# Patient Record
Sex: FEMALE | Race: WHITE | NOT HISPANIC OR LATINO | Employment: FULL TIME | URBAN - METROPOLITAN AREA
[De-identification: names, ages, dates, MRNs, and addresses within clinical notes are randomized per-mention and may not be internally consistent; named-entity substitution may affect disease eponyms.]

---

## 2021-12-06 ENCOUNTER — TELEPHONE (OUTPATIENT)
Dept: SCHEDULING | Facility: IMAGING CENTER | Age: 46
End: 2021-12-06

## 2021-12-10 ENCOUNTER — HOSPITAL ENCOUNTER (OUTPATIENT)
Facility: MEDICAL CENTER | Age: 46
End: 2021-12-10
Attending: STUDENT IN AN ORGANIZED HEALTH CARE EDUCATION/TRAINING PROGRAM

## 2021-12-10 ENCOUNTER — OFFICE VISIT (OUTPATIENT)
Dept: MEDICAL GROUP | Facility: PHYSICIAN GROUP | Age: 46
End: 2021-12-10

## 2021-12-10 ENCOUNTER — APPOINTMENT (OUTPATIENT)
Dept: RADIOLOGY | Facility: IMAGING CENTER | Age: 46
End: 2021-12-10
Attending: STUDENT IN AN ORGANIZED HEALTH CARE EDUCATION/TRAINING PROGRAM

## 2021-12-10 VITALS
BODY MASS INDEX: 39.4 KG/M2 | OXYGEN SATURATION: 96 % | HEART RATE: 68 BPM | DIASTOLIC BLOOD PRESSURE: 84 MMHG | WEIGHT: 260 LBS | TEMPERATURE: 97.8 F | RESPIRATION RATE: 16 BRPM | HEIGHT: 68 IN | SYSTOLIC BLOOD PRESSURE: 132 MMHG

## 2021-12-10 DIAGNOSIS — M54.12 RADICULOPATHY, CERVICAL: ICD-10-CM

## 2021-12-10 DIAGNOSIS — M53.3 SACROILIAC JOINT DYSFUNCTION: ICD-10-CM

## 2021-12-10 DIAGNOSIS — J45.41 MODERATE PERSISTENT ASTHMA WITH ACUTE EXACERBATION: ICD-10-CM

## 2021-12-10 DIAGNOSIS — R06.02 SHORTNESS OF BREATH: ICD-10-CM

## 2021-12-10 DIAGNOSIS — M06.09 RHEUMATOID ARTHRITIS WITHOUT RHEUMATOID FACTOR, MULTIPLE SITES (HCC): ICD-10-CM

## 2021-12-10 DIAGNOSIS — F41.9 ANXIETY: ICD-10-CM

## 2021-12-10 DIAGNOSIS — E55.9 VITAMIN D DEFICIENCY: ICD-10-CM

## 2021-12-10 DIAGNOSIS — R41.840 ATTENTION AND CONCENTRATION DEFICIT: ICD-10-CM

## 2021-12-10 DIAGNOSIS — B35.4 TINEA CORPORIS: ICD-10-CM

## 2021-12-10 DIAGNOSIS — R60.0 LOWER EXTREMITY EDEMA: ICD-10-CM

## 2021-12-10 DIAGNOSIS — M25.50 ARTHRALGIA OF MULTIPLE JOINTS: ICD-10-CM

## 2021-12-10 DIAGNOSIS — M50.30 DDD (DEGENERATIVE DISC DISEASE), CERVICAL: ICD-10-CM

## 2021-12-10 DIAGNOSIS — G89.4 CHRONIC PAIN SYNDROME: ICD-10-CM

## 2021-12-10 DIAGNOSIS — F51.01 PRIMARY INSOMNIA: ICD-10-CM

## 2021-12-10 DIAGNOSIS — L30.9 ECZEMA, UNSPECIFIED TYPE: ICD-10-CM

## 2021-12-10 DIAGNOSIS — Z76.89 ENCOUNTER TO ESTABLISH CARE: ICD-10-CM

## 2021-12-10 DIAGNOSIS — F33.1 MODERATE EPISODE OF RECURRENT MAJOR DEPRESSIVE DISORDER (HCC): ICD-10-CM

## 2021-12-10 PROBLEM — I51.89 DIASTOLIC DYSFUNCTION: Status: ACTIVE | Noted: 2021-12-10

## 2021-12-10 PROBLEM — G47.00 INSOMNIA: Status: ACTIVE | Noted: 2021-12-10

## 2021-12-10 LAB
ALBUMIN SERPL BCP-MCNC: 4.2 G/DL (ref 3.2–4.9)
ALBUMIN/GLOB SERPL: 1.6 G/DL
ALP SERPL-CCNC: 97 U/L (ref 30–99)
ALT SERPL-CCNC: 37 U/L (ref 2–50)
ANION GAP SERPL CALC-SCNC: 12 MMOL/L (ref 7–16)
AST SERPL-CCNC: 34 U/L (ref 12–45)
BASOPHILS # BLD AUTO: 0.8 % (ref 0–1.8)
BASOPHILS # BLD: 0.06 K/UL (ref 0–0.12)
BILIRUB SERPL-MCNC: 0.3 MG/DL (ref 0.1–1.5)
BUN SERPL-MCNC: 19 MG/DL (ref 8–22)
CALCIUM SERPL-MCNC: 9 MG/DL (ref 8.4–10.2)
CHLORIDE SERPL-SCNC: 103 MMOL/L (ref 96–112)
CO2 SERPL-SCNC: 25 MMOL/L (ref 20–33)
CREAT SERPL-MCNC: 1.03 MG/DL (ref 0.5–1.4)
EOSINOPHIL # BLD AUTO: 0.46 K/UL (ref 0–0.51)
EOSINOPHIL NFR BLD: 6.1 % (ref 0–6.9)
ERYTHROCYTE [DISTWIDTH] IN BLOOD BY AUTOMATED COUNT: 42.2 FL (ref 35.9–50)
GLOBULIN SER CALC-MCNC: 2.7 G/DL (ref 1.9–3.5)
GLUCOSE SERPL-MCNC: 111 MG/DL (ref 65–99)
HCT VFR BLD AUTO: 45.8 % (ref 37–47)
HGB BLD-MCNC: 15.3 G/DL (ref 12–16)
IMM GRANULOCYTES # BLD AUTO: 0.04 K/UL (ref 0–0.11)
IMM GRANULOCYTES NFR BLD AUTO: 0.5 % (ref 0–0.9)
LYMPHOCYTES # BLD AUTO: 2.55 K/UL (ref 1–4.8)
LYMPHOCYTES NFR BLD: 33.8 % (ref 22–41)
MCH RBC QN AUTO: 28.8 PG (ref 27–33)
MCHC RBC AUTO-ENTMCNC: 33.4 G/DL (ref 33.6–35)
MCV RBC AUTO: 86.3 FL (ref 81.4–97.8)
MONOCYTES # BLD AUTO: 0.48 K/UL (ref 0–0.85)
MONOCYTES NFR BLD AUTO: 6.4 % (ref 0–13.4)
NEUTROPHILS # BLD AUTO: 3.95 K/UL (ref 2–7.15)
NEUTROPHILS NFR BLD: 52.4 % (ref 44–72)
NRBC # BLD AUTO: 0 K/UL
NRBC BLD-RTO: 0 /100 WBC
NT-PROBNP SERPL IA-MCNC: 134 PG/ML (ref 0–125)
PLATELET # BLD AUTO: 183 K/UL (ref 164–446)
PMV BLD AUTO: 10.6 FL (ref 9–12.9)
POTASSIUM SERPL-SCNC: 4.4 MMOL/L (ref 3.6–5.5)
PROT SERPL-MCNC: 6.9 G/DL (ref 6–8.2)
RBC # BLD AUTO: 5.31 M/UL (ref 4.2–5.4)
SODIUM SERPL-SCNC: 140 MMOL/L (ref 135–145)
TSH SERPL DL<=0.005 MIU/L-ACNC: 5.13 UIU/ML (ref 0.38–5.33)
WBC # BLD AUTO: 7.5 K/UL (ref 4.8–10.8)

## 2021-12-10 PROCEDURE — 71046 X-RAY EXAM CHEST 2 VIEWS: CPT | Mod: TC | Performed by: FAMILY MEDICINE

## 2021-12-10 PROCEDURE — 80053 COMPREHEN METABOLIC PANEL: CPT

## 2021-12-10 PROCEDURE — 99204 OFFICE O/P NEW MOD 45 MIN: CPT | Performed by: STUDENT IN AN ORGANIZED HEALTH CARE EDUCATION/TRAINING PROGRAM

## 2021-12-10 PROCEDURE — 83880 ASSAY OF NATRIURETIC PEPTIDE: CPT

## 2021-12-10 PROCEDURE — 85025 COMPLETE CBC W/AUTO DIFF WBC: CPT

## 2021-12-10 PROCEDURE — 82306 VITAMIN D 25 HYDROXY: CPT

## 2021-12-10 PROCEDURE — 84443 ASSAY THYROID STIM HORMONE: CPT

## 2021-12-10 RX ORDER — LEVALBUTEROL TARTRATE 45 UG/1
1-2 AEROSOL, METERED ORAL EVERY 4 HOURS PRN
COMMUNITY
End: 2021-12-16 | Stop reason: SDUPTHER

## 2021-12-10 RX ORDER — DULOXETIN HYDROCHLORIDE 60 MG/1
120 CAPSULE, DELAYED RELEASE ORAL DAILY
COMMUNITY
Start: 2021-11-23 | End: 2022-07-26 | Stop reason: SDUPTHER

## 2021-12-10 RX ORDER — PREDNISONE 20 MG/1
40 TABLET ORAL DAILY
Qty: 10 TABLET | Refills: 0 | Status: SHIPPED | OUTPATIENT
Start: 2021-12-10 | End: 2021-12-15

## 2021-12-10 RX ORDER — ZOLPIDEM TARTRATE 10 MG/1
10 TABLET ORAL
COMMUNITY
Start: 2021-11-30 | End: 2022-01-28 | Stop reason: SDUPTHER

## 2021-12-10 RX ORDER — METAXALONE 800 MG/1
800 TABLET ORAL EVERY 12 HOURS PRN
COMMUNITY
Start: 2021-11-26 | End: 2022-08-02 | Stop reason: SDUPTHER

## 2021-12-10 RX ORDER — OMEPRAZOLE 40 MG/1
40 CAPSULE, DELAYED RELEASE ORAL DAILY
COMMUNITY
Start: 2021-11-10 | End: 2022-09-01 | Stop reason: SDUPTHER

## 2021-12-10 RX ORDER — CELECOXIB 200 MG/1
200 CAPSULE ORAL DAILY
COMMUNITY
Start: 2021-11-12 | End: 2022-11-29

## 2021-12-10 RX ORDER — FUROSEMIDE 20 MG/1
20 TABLET ORAL
Qty: 30 TABLET | Refills: 0 | Status: SHIPPED | OUTPATIENT
Start: 2021-12-10 | End: 2022-01-05

## 2021-12-10 RX ORDER — PROPRANOLOL HYDROCHLORIDE 80 MG/1
80 CAPSULE, EXTENDED RELEASE ORAL DAILY
COMMUNITY
Start: 2021-11-28 | End: 2022-11-29 | Stop reason: SDUPTHER

## 2021-12-10 RX ORDER — BREXPIPRAZOLE 3 MG/1
3 TABLET ORAL DAILY
COMMUNITY
Start: 2021-11-26 | End: 2022-05-09

## 2021-12-10 RX ORDER — AMOXICILLIN 500 MG/1
TABLET, FILM COATED ORAL
COMMUNITY
Start: 2021-10-15 | End: 2021-12-10

## 2021-12-10 RX ORDER — AZITHROMYCIN 250 MG/1
TABLET, FILM COATED ORAL
COMMUNITY
Start: 2021-10-15 | End: 2021-12-10

## 2021-12-10 RX ORDER — ATORVASTATIN CALCIUM 20 MG/1
20 TABLET, FILM COATED ORAL DAILY
COMMUNITY
Start: 2021-11-26 | End: 2022-03-30 | Stop reason: SDUPTHER

## 2021-12-10 RX ORDER — PREGABALIN 200 MG/1
200-400 CAPSULE ORAL 2 TIMES DAILY
COMMUNITY
End: 2022-01-26 | Stop reason: SDUPTHER

## 2021-12-10 RX ORDER — CLOTRIMAZOLE AND BETAMETHASONE DIPROPIONATE 10; .64 MG/G; MG/G
1 CREAM TOPICAL 2 TIMES DAILY
Qty: 60 G | Refills: 0 | Status: SHIPPED | OUTPATIENT
Start: 2021-12-10 | End: 2022-02-15

## 2021-12-10 RX ORDER — ALPRAZOLAM 1 MG/1
1 TABLET ORAL EVERY 8 HOURS PRN
COMMUNITY
End: 2022-01-28 | Stop reason: SDUPTHER

## 2021-12-10 RX ORDER — CYCLOBENZAPRINE HCL 10 MG
10 TABLET ORAL DAILY
COMMUNITY
Start: 2021-11-27 | End: 2022-02-07 | Stop reason: SDUPTHER

## 2021-12-10 RX ORDER — HYDROXYZINE PAMOATE 50 MG/1
CAPSULE ORAL
COMMUNITY
Start: 2021-09-21 | End: 2021-12-10

## 2021-12-10 ASSESSMENT — PATIENT HEALTH QUESTIONNAIRE - PHQ9
SUM OF ALL RESPONSES TO PHQ QUESTIONS 1-9: 17
CLINICAL INTERPRETATION OF PHQ2 SCORE: 6
5. POOR APPETITE OR OVEREATING: 0 - NOT AT ALL

## 2021-12-10 NOTE — ASSESSMENT & PLAN NOTE
This is a chronic condition.  Patient states she was previously on Advair but has not taken in some time.  She states that she periodically uses Xopenex, no longer albuterol for about 3 months as it was causing higher heart rate.  She finds that it is effective but using multiple times a day, usually 6.  In the past 2 weeks she has had more shortness of breath which feels to her like an asthma exacerbation.

## 2021-12-10 NOTE — PROGRESS NOTES
Subjective:     CC: establish care and discuss shortness of breath and leg swelling    HISTORY OF THE PRESENT ILLNESS: Patient is a 46 y.o. female. This pleasant patient is here today to establish care and discuss shortness of breath and leg swelling. His/her prior PCP was Dr. Kruse.    Moderate persistent asthma with acute exacerbation  This is a chronic condition.  Patient states she was previously on Advair but has not taken in some time.  She states that she periodically uses Xopenex, no longer albuterol for about 3 months as it was causing higher heart rate.  She finds that it is effective but using multiple times a day, usually 6.  In the past 2 weeks she has had more shortness of breath which feels to her like an asthma exacerbation.    Reports ongoing LE edema for more than 2 months. Her rheumatologist ordered an echo, but didn't rx any medications. Doesn't follow with cardiologist, but works at a cardiology office at [employer]. Patient reports she tries not to go to the doctor unless she has to. Denies orthopnea, chest pain or lightheadedness.    Patient reports her psychiatrist 'disappeared' (Dr. Wang) and she has been out of her methylphenidate (reports 20mg BID) for about a month, having issues with concentration. Patient thinks she may be bipolar, states she has no formal dx of that.    Has an ongoing pruritic rash on her left hand, improved with antifungal/steroid cream and needs a refill. Reports past hx of eczema.     Current Outpatient Medications Ordered in Epic   Medication Sig Dispense Refill   • atorvastatin (LIPITOR) 20 MG Tab Take 20 mg by mouth every day.     • REXULTI 3 MG Tab Take 3 mg by mouth every day.     • celecoxib (CELEBREX) 200 MG Cap Take 200 mg by mouth every day.     • cyclobenzaprine (FLEXERIL) 10 mg Tab Take 10 mg by mouth every day.     • DULoxetine (CYMBALTA) 60 MG Cap DR Particles delayed-release capsule Take 120 mg by mouth every day.     • metaxalone (SKELAXIN) 800  "MG Tab Take 800 mg by mouth every 12 hours as needed.     • propranolol CR (INDERAL LA) 80 MG CAPSULE SR 24 HR Take 80 mg by mouth every day.     • zolpidem (AMBIEN) 10 MG Tab      • omeprazole (PRILOSEC) 40 MG delayed-release capsule Take 40 mg by mouth every day.     • levalbuterol (XOPENEX HFA) 45 MCG/ACT inhaler Inhale 1-2 Puffs every four hours as needed for Shortness of Breath.     • predniSONE (DELTASONE) 20 MG Tab Take 2 Tablets by mouth every day for 5 days. 10 Tablet 0   • pregabalin (LYRICA) 200 MG capsule Take 200-400 mg by mouth 2 times a day. 200mg in the morning and 400mg at night     • ALPRAZolam (XANAX) 1 MG Tab Take 1 mg by mouth every 8 hours as needed for Sleep.     • clotrimazole-betamethasone (LOTRISONE) 1-0.05 % Cream Apply 1 Application topically 2 times a day. 60 g 0   • fluticasone-salmeterol (ADVAIR) 250-50 MCG/DOSE AEROSOL POWDER, BREATH ACTIVATED Inhale 1 Puff every 12 hours. 60 Each 3   • furosemide (LASIX) 20 MG Tab Take 1 Tablet by mouth 1 time a day as needed (edema). 30 Tablet 0   • ergocalciferol (DRISDOL) 76706 UNIT capsule Take 1 Cap by mouth. Take 2 a week for one month, then 1 weekly. Refills for 4 at a time 8 Cap 11   • ZYRTEC 10 MG PO TABS Take 25 mg by mouth every day.       No current [facility]-ordered facility-administered medications on file.     ROS:   Gen: no fevers/chills, no changes in weight  Eyes: no changes in vision  ENT: no sore throat  Pulm: no cough  CV: no chest pain, no palpitations  GI: no nausea/vomiting, no diarrhea  Skin: no rash  Neuro: no headaches, no numbness/tingling  Heme/Lymph: no easy bruising      Objective:     Exam: /84 (BP Location: Left arm, Patient Position: Sitting, BP Cuff Size: Large adult)   Pulse 68   Temp 36.6 °C (97.8 °F) (Temporal)   Resp 16   Ht 1.727 m (5' 8\")   Wt 118 kg (260 lb)   SpO2 96%  Body mass index is 39.53 kg/m².    General: Well developed, well nourished in no acute distress.  Head: Normocephalic and " atraumatic.  Eyes: Conjunctivae and extraocular motions are normal. Pupils are equal, round. No scleral icterus.   Mouth/Throat: Oropharynx is moist.  Neck: Supple. Thyroid is not grossly enlarged. No JVD.  Pulmonary: Clear to ausculation.  Normal effort at rest, but some conversational dyspnea noted. Very shallow effort, poor air movement. No rales, ronchi, or wheezing.  Cardiovascular: Regular rate and rhythm without murmur.  Abdomen: Soft, nontender, nondistended. Normal bowel sounds. No HSM.  Neurologic: No gross/focal deficits. Normal gait.   Lymph: No cervical or supraclavicular lymph nodes are palpable  Skin: Warm and dry.  Dry excoriated plaque on right hand (palmar).  Musculoskeletal: No extremity cyanosis, clubbing. +1 pitting edema bilat to mid shin, negative homans  Psych:   Appearance: Clothing and grooming normal.  Mood: depressed  Behavior: No psychomotor abnormalities or impulsivity. Attention is good. Patient is pleasant and cooperative.   Eye contact: good   Affect: normal  Speech/thought content: Normal speech pattern. Normal insight and reasoning, no evidence of psychotic process. Reports suicidal thoughts, no plans or action. 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    Assessment & Plan:   46 y.o. female with the following -    1. Encounter to establish care  Medical record reviewed and updated with patient.     2. Moderate persistent asthma with acute exacerbation  - predniSONE (DELTASONE) 20 MG Tab; Take 2 Tablets by mouth every day for 5 days.  Dispense: 10 Tablet; Refill: 0  - fluticasone-salmeterol (ADVAIR) 250-50 MCG/DOSE AEROSOL POWDER, BREATH ACTIVATED; Inhale 1 Puff every 12 hours.  Dispense: 60 Each; Refill: 3  3. Shortness of breath  Chronic asthma with acute worsening. CXR obtained given prior hx of PNA and LE edema-no findings aside from atelectasis. BNP minimally elevated, TSH/CBC normal. Mild decrease in GFR noted (unknown baseline). Treat for asthma  exacerbation with prednisone (SE reviewed) and scheduled YANET, restart advair. Discussed ED precautions, f/u next week.  - DX-CHEST-2 VIEWS; Future    4. Lower extremity edema  Acute, but on going. Reviewed echo from late this year, BNP minimally elevated. Start lasix as below, continue compression. Minimal decrease of GFR with unknown baseline, will plan to trend.  - furosemide (LASIX) 20 MG Tab; Take 1 Tablet by mouth 1 time a day as needed (edema).  Dispense: 30 Tablet; Refill: 0    5. Primary insomnia  6. Anxiety  7. Moderate episode of recurrent major depressive disorder (HCC)  8. Attention and concentration deficit  Chronic conditions, prior followed by Dr. Wang-no records to review at this time. Referred to establish with new psychiatrist. PDMP reviewed, stated dose of methylphenidate didn't match what as rx-defer refill to psychiatry. No current safety concerns.  - Referral to Psychiatry    9. Tinea corporis  Chronic, improving per patient on below medication-continue. Referred to dermatology.  - clotrimazole-betamethasone (LOTRISONE) 1-0.05 % Cream; Apply 1 Application topically 2 times a day. (Patient not taking: Reported on 12/11/2021)  Dispense: 60 g; Refill: 0    10. Eczema, unspecified type  - Referral to Dermatology    11. Rheumatoid arthritis without rheumatoid factor, multiple sites (HCC)  Not mentioned by patient, but noted in encounter hx, followed by Dr. Olivo-continue care with rheum.    12. Chronic pain syndrome  13. Arthralgia of multiple joints  14. Sacroiliac joint dysfunction  15. Radiculopathy, cervical  16. DDD (degenerative disc disease), cervical  Patient followed by rheumatology, Dr. Olivo, but may benefit from PM&R evaluation of chronic pain. Referred.  - Referral to Pain Management    Return in about 5 days (around 12/15/2021), or if symptoms worsen or fail to improve.    Please note that this dictation was created using voice recognition software. I have made every reasonable  attempt to correct obvious errors, but I expect that there are errors of grammar and possibly content that I did not discover before finalizing the note.

## 2021-12-11 PROBLEM — M06.09 RHEUMATOID ARTHRITIS WITHOUT RHEUMATOID FACTOR, MULTIPLE SITES (HCC): Status: ACTIVE | Noted: 2021-12-11

## 2021-12-13 LAB — 25(OH)D3 SERPL-MCNC: 26 NG/ML (ref 30–80)

## 2021-12-16 ENCOUNTER — OFFICE VISIT (OUTPATIENT)
Dept: MEDICAL GROUP | Facility: PHYSICIAN GROUP | Age: 46
End: 2021-12-16

## 2021-12-16 VITALS
HEIGHT: 68 IN | HEART RATE: 72 BPM | BODY MASS INDEX: 39.71 KG/M2 | WEIGHT: 262 LBS | TEMPERATURE: 98.1 F | OXYGEN SATURATION: 93 % | DIASTOLIC BLOOD PRESSURE: 80 MMHG | SYSTOLIC BLOOD PRESSURE: 128 MMHG

## 2021-12-16 DIAGNOSIS — R06.83 SNORING: ICD-10-CM

## 2021-12-16 DIAGNOSIS — Z12.31 ENCOUNTER FOR SCREENING MAMMOGRAM FOR BREAST CANCER: ICD-10-CM

## 2021-12-16 DIAGNOSIS — Z13.220 SCREENING, LIPID: ICD-10-CM

## 2021-12-16 DIAGNOSIS — J45.40 MODERATE PERSISTENT ASTHMA WITHOUT COMPLICATION: ICD-10-CM

## 2021-12-16 DIAGNOSIS — R06.09 DYSPNEA ON EXERTION: ICD-10-CM

## 2021-12-16 DIAGNOSIS — R60.0 LOWER EXTREMITY EDEMA: ICD-10-CM

## 2021-12-16 DIAGNOSIS — N28.9 DECREASED RENAL FUNCTION: ICD-10-CM

## 2021-12-16 DIAGNOSIS — E55.9 VITAMIN D DEFICIENCY: ICD-10-CM

## 2021-12-16 DIAGNOSIS — Z91.89 AT RISK FOR SLEEP APNEA: ICD-10-CM

## 2021-12-16 PROBLEM — Z86.010 HISTORY OF COLONIC POLYPS: Status: ACTIVE | Noted: 2021-12-16

## 2021-12-16 PROBLEM — K58.1 IRRITABLE BOWEL SYNDROME WITH CONSTIPATION: Status: ACTIVE | Noted: 2021-12-16

## 2021-12-16 PROCEDURE — 99214 OFFICE O/P EST MOD 30 MIN: CPT | Performed by: STUDENT IN AN ORGANIZED HEALTH CARE EDUCATION/TRAINING PROGRAM

## 2021-12-16 RX ORDER — LEVALBUTEROL TARTRATE 45 UG/1
1-2 AEROSOL, METERED ORAL EVERY 4 HOURS PRN
Qty: 30 G | Refills: 3 | Status: SHIPPED | OUTPATIENT
Start: 2021-12-16 | End: 2023-02-14 | Stop reason: SDUPTHER

## 2021-12-16 RX ORDER — METHYLPHENIDATE HYDROCHLORIDE 10 MG/1
TABLET ORAL
COMMUNITY
Start: 2021-12-10 | End: 2022-01-28

## 2021-12-16 ASSESSMENT — FIBROSIS 4 INDEX: FIB4 SCORE: 1.41

## 2021-12-16 NOTE — PROGRESS NOTES
Subjective:     CC: Follow-up    HPI:   Kasie presents today for follow-up from last week.    See this providers note for details from last week.    Patient states that she is much improved after completion of prednisone.  Has also restarted her Advair which also seems to be helping as well.  Patient wonders if there is more to her dyspnea on exertion than just her asthma.  She does state that it has improved since last visit.  She states that propanolol is a necessity for her otherwise her heart rate increases, denies any palpitations while on this medication.  Her lower extremity edema has improved as well with furosemide 20 mg daily which she is tolerating well.  Still denies any calf pain or erythema.  She denies any shortness of breath at rest, orthopnea or cough.  Denies any wheezing and has not been using Xopenex because she felt that it was not necessary.  She states that since her last visit her methylphenidate somehow was refilled and so she is restarted it which is helping her concentration.  She has not heard back from her prior psychiatrist and wonders if her new authorization has been processed.    Patient does admit to some weight gain in the past year and wonders if that is affecting her breathing as well.  Patient states that she is always had snoring which is slightly worse recently and wonders if she should have a repeat sleep study.    She reports her right hand rash has improved with the medication, has a dermatology appointment in January.    Current Outpatient Medications Ordered in Epic   Medication Sig Dispense Refill   • methylphenidate (RITALIN) 10 MG Tab      • levalbuterol (XOPENEX HFA) 45 MCG/ACT inhaler Inhale 1-2 Puffs every four hours as needed for Shortness of Breath (asthma symptoms). 30 g 3   • atorvastatin (LIPITOR) 20 MG Tab Take 20 mg by mouth every day.     • REXULTI 3 MG Tab Take 3 mg by mouth every day.     • celecoxib (CELEBREX) 200 MG Cap Take 200 mg by mouth every day.      • cyclobenzaprine (FLEXERIL) 10 mg Tab Take 10 mg by mouth every day.     • DULoxetine (CYMBALTA) 60 MG Cap DR Particles delayed-release capsule Take 120 mg by mouth every day.     • metaxalone (SKELAXIN) 800 MG Tab Take 800 mg by mouth every 12 hours as needed.     • propranolol CR (INDERAL LA) 80 MG CAPSULE SR 24 HR Take 80 mg by mouth every day.     • zolpidem (AMBIEN) 10 MG Tab Take 10 mg by mouth at bedtime as needed for Sleep.     • omeprazole (PRILOSEC) 40 MG delayed-release capsule Take 40 mg by mouth every day.     • pregabalin (LYRICA) 200 MG capsule Take 200-400 mg by mouth 2 times a day. 200mg in the morning and 400mg at night     • ALPRAZolam (XANAX) 1 MG Tab Take 1 mg by mouth every 8 hours as needed for Sleep.     • clotrimazole-betamethasone (LOTRISONE) 1-0.05 % Cream Apply 1 Application topically 2 times a day. 60 g 0   • fluticasone-salmeterol (ADVAIR) 250-50 MCG/DOSE AEROSOL POWDER, BREATH ACTIVATED Inhale 1 Puff every 12 hours. 60 Each 3   • furosemide (LASIX) 20 MG Tab Take 1 Tablet by mouth 1 time a day as needed (edema). 30 Tablet 0   • ergocalciferol (DRISDOL) 04414 UNIT capsule Take 1 Cap by mouth. Take 2 a week for one month, then 1 weekly. Refills for 4 at a time 8 Cap 11   • ZYRTEC 10 MG PO TABS Take 10 mg by mouth every day.       No current [facility]-ordered facility-administered medications on file.     Health Maintenance: Patient to call and ask prior provider whether or not she needs to continue with Pap smears given that she has a history of abnormal Pap in the past but has had total hysterectomy.  Recommended pneumococcal, influenza and Covid booster.  Also due for Tdap.    ROS:  Gen: no fevers/chills, no changes in weight  Eyes: no changes in vision  ENT: no sore throat, no hearing loss, no bloody nose  Pulm: no sob at rest, no cough  CV: no chest pain, no palpitations  GI: no nausea/vomiting, no diarrhea, some constipation  : no dysuria  MSk: no myalgias  Skin: right hand  Neuro:  "no headaches, no numbness/tingling  Heme/Lymph: no easy bruising    Objective:     Exam:  /80 (BP Location: Left arm, Patient Position: Sitting, BP Cuff Size: Large adult)   Pulse 72   Temp 36.7 °C (98.1 °F) (Temporal)   Ht 1.727 m (5' 8\")   Wt 119 kg (262 lb)   LMP 07/28/2006   SpO2 93%   BMI 39.84 kg/m²  Body mass index is 39.84 kg/m².   Oxygen on ambulation: Dipped to 89% briefly on room air, recovered easily and averaged in the low 90s on 6-minute walk test, mostly 92 to 94%.  Heart rate never above 100 with ambulation.  Needed to rest once.    Physical Exam:  Constitutional: Well-developed and well-nourished. No acute distress.   Skin: Skin is warm and dry. Dry excoriated plaque on right hand (palmar)-improved from last week.  Head: Atraumatic without lesions.  Eyes: Conjunctivae and extraocular motions are normal. Pupils are equal, round. No scleral icterus.   Mouth/Throat: Wearing mask.  Neck: Supple, trachea midline.  Cardiovascular: Regular rate and rhythm, S1 and S2 without murmur, rubs, or gallops.  Lungs: Normal inspiratory effort, CTA bilaterally, no wheezes/rhonchi/rales. Improved aeration from prior.  Extremities: No cyanosis, clubbing, erythema. Trace pitting edema about the ankles-wearing compression stockings.  Negative Homans' sign bilaterally.  Neurological: Alert and oriented x 3. No gross/focal deficits.  Psychiatric:  Behavior, mood, and affect are appropriate.    Labs: From 12/10/2021    Assessment & Plan:     46 y.o. female with the following -     1. Moderate persistent asthma without complication  This is a chronic condition, improved from last week after prednisone.  Also restarted Advair.  Will complete pulmonary function test to evaluate severity of asthma, referred to pulmonary medicine as well.  Continue Advair and as needed Xopenex.  Discussed ER/return precautions.  We did discuss her propranolol and how it may be contributing, but patient would like to continue on that " medication at this time.  - PULMONARY FUNCTION TESTS -Test requested: Complete Pulmonary Function Test; Future  - Referral to Pulmonary and Sleep Medicine  - levalbuterol (XOPENEX HFA) 45 MCG/ACT inhaler; Inhale 1-2 Puffs every four hours as needed for Shortness of Breath (asthma symptoms).  Dispense: 30 g; Refill: 3    2. Dyspnea on exertion  This is an acute, improved condition after steroids last week for asthma exacerbation.  This may be related to increase in her weight.  Will obtain pulmonary function tests as above, has already had an echocardiogram this year.  May consider cardiology referral if still unclear etiology or not improved.  Discussed pulmonary embolism as consideration, seems unlikely but we decided to obtain a D-dimer given her lower extremity edema although she has no other signs of DVT and if elevated will proceed with PE study.  - PULMONARY FUNCTION TESTS -Test requested: Complete Pulmonary Function Test; Future  - Referral to Pulmonary and Sleep Medicine  - D-DIMER; Future    3. Lower extremity edema  Acute, improved with furosemide.  Echocardiogram without heart failure.  Continue compression and furosemide, will be trending renal function.  Obtaining D-dimer for reasons as above.  - D-DIMER; Future    4. BMI 39.0-39.9,adult  This is a chronic condition, worsened in the past year due to some inactivity.  Hoping to start exercise again once breathing improves as above.  Will trend.    5. Vitamin D deficiency  This is a chronic condition, improved.  Patient will continue 50,000 IU vitamin D2 weekly.    6. Decreased renal function  A mild, acute with an unknown baseline as its been 10 years since her last renal study.  Will trend.  - Basic Metabolic Panel; Future    7. Snoring  8. At risk for sleep apnea  With increased weight gain, reasonable to repeat her sleep study.  - Referral to Pulmonary and Sleep Medicine    9. Encounter for screening mammogram for breast cancer  - MA-SCREENING MAMMO  BILAT W/TOMOSYNTHESIS W/CAD; Future    10. Screening, lipid  - Lipid Profile; Future    Return in about 4 weeks (around 1/13/2022), or if symptoms worsen or fail to improve.    Please note that this dictation was created using voice recognition software. I have made every reasonable attempt to correct obvious errors, but I expect that there are errors of grammar and possibly content that I did not discover before finalizing the note.

## 2021-12-17 NOTE — PATIENT INSTRUCTIONS
[facility]  [address] [place] [address] [address]  [place] [id number]  Phone: [phone]  Fax: [phone]    Call and ask if you need a PAP smear.

## 2022-01-02 DIAGNOSIS — R60.0 LOWER EXTREMITY EDEMA: ICD-10-CM

## 2022-01-03 ENCOUNTER — HOSPITAL ENCOUNTER (OUTPATIENT)
Dept: LAB | Facility: MEDICAL CENTER | Age: 47
End: 2022-01-03
Attending: STUDENT IN AN ORGANIZED HEALTH CARE EDUCATION/TRAINING PROGRAM
Payer: COMMERCIAL

## 2022-01-03 DIAGNOSIS — N28.9 DECREASED RENAL FUNCTION: ICD-10-CM

## 2022-01-03 DIAGNOSIS — R06.09 DYSPNEA ON EXERTION: ICD-10-CM

## 2022-01-03 DIAGNOSIS — R60.0 LOWER EXTREMITY EDEMA: ICD-10-CM

## 2022-01-03 DIAGNOSIS — Z13.220 SCREENING, LIPID: ICD-10-CM

## 2022-01-03 LAB
ANION GAP SERPL CALC-SCNC: 14 MMOL/L (ref 7–16)
BUN SERPL-MCNC: 19 MG/DL (ref 8–22)
CALCIUM SERPL-MCNC: 9.4 MG/DL (ref 8.5–10.5)
CHLORIDE SERPL-SCNC: 103 MMOL/L (ref 96–112)
CHOLEST SERPL-MCNC: 150 MG/DL (ref 100–199)
CO2 SERPL-SCNC: 21 MMOL/L (ref 20–33)
CREAT SERPL-MCNC: 0.91 MG/DL (ref 0.5–1.4)
D DIMER PPP IA.FEU-MCNC: 0.46 UG/ML (FEU) (ref 0–0.5)
FASTING STATUS PATIENT QL REPORTED: NORMAL
GLUCOSE SERPL-MCNC: 107 MG/DL (ref 65–99)
HDLC SERPL-MCNC: 38 MG/DL
LDLC SERPL CALC-MCNC: 83 MG/DL
POTASSIUM SERPL-SCNC: 4.6 MMOL/L (ref 3.6–5.5)
SODIUM SERPL-SCNC: 138 MMOL/L (ref 135–145)
TRIGL SERPL-MCNC: 144 MG/DL (ref 0–149)

## 2022-01-03 PROCEDURE — 80061 LIPID PANEL: CPT

## 2022-01-03 PROCEDURE — 80048 BASIC METABOLIC PNL TOTAL CA: CPT

## 2022-01-03 PROCEDURE — 85379 FIBRIN DEGRADATION QUANT: CPT

## 2022-01-03 PROCEDURE — 36415 COLL VENOUS BLD VENIPUNCTURE: CPT

## 2022-01-05 RX ORDER — FUROSEMIDE 20 MG/1
20 TABLET ORAL
Qty: 30 TABLET | Refills: 0 | Status: SHIPPED | OUTPATIENT
Start: 2022-01-05 | End: 2022-01-20 | Stop reason: SDUPTHER

## 2022-01-11 ENCOUNTER — OFFICE VISIT (OUTPATIENT)
Dept: PHYSICAL MEDICINE AND REHAB | Facility: MEDICAL CENTER | Age: 47
End: 2022-01-11
Payer: COMMERCIAL

## 2022-01-11 VITALS
BODY MASS INDEX: 40.43 KG/M2 | HEIGHT: 68 IN | HEART RATE: 73 BPM | WEIGHT: 266.76 LBS | DIASTOLIC BLOOD PRESSURE: 85 MMHG | RESPIRATION RATE: 16 BRPM | SYSTOLIC BLOOD PRESSURE: 132 MMHG | OXYGEN SATURATION: 93 % | TEMPERATURE: 97.6 F

## 2022-01-11 DIAGNOSIS — G89.4 CHRONIC PAIN SYNDROME: ICD-10-CM

## 2022-01-11 DIAGNOSIS — M50.30 DDD (DEGENERATIVE DISC DISEASE), CERVICAL: ICD-10-CM

## 2022-01-11 DIAGNOSIS — R26.89 BALANCE PROBLEM: ICD-10-CM

## 2022-01-11 DIAGNOSIS — M54.16 LUMBAR RADICULOPATHY: Primary | ICD-10-CM

## 2022-01-11 DIAGNOSIS — R20.0 NUMBNESS AND TINGLING: ICD-10-CM

## 2022-01-11 DIAGNOSIS — M06.09 RHEUMATOID ARTHRITIS WITHOUT RHEUMATOID FACTOR, MULTIPLE SITES (HCC): ICD-10-CM

## 2022-01-11 DIAGNOSIS — M54.12 CERVICAL RADICULOPATHY: ICD-10-CM

## 2022-01-11 DIAGNOSIS — R20.2 NUMBNESS AND TINGLING: ICD-10-CM

## 2022-01-11 PROCEDURE — 99205 OFFICE O/P NEW HI 60 MIN: CPT | Performed by: STUDENT IN AN ORGANIZED HEALTH CARE EDUCATION/TRAINING PROGRAM

## 2022-01-11 RX ORDER — TACROLIMUS 1 MG/G
OINTMENT TOPICAL 2 TIMES DAILY
COMMUNITY
End: 2022-02-15

## 2022-01-11 ASSESSMENT — PAIN SCALES - GENERAL: PAINLEVEL: 7=MODERATE-SEVERE PAIN

## 2022-01-11 ASSESSMENT — FIBROSIS 4 INDEX: FIB4 SCORE: 1.41

## 2022-01-11 ASSESSMENT — PATIENT HEALTH QUESTIONNAIRE - PHQ9
5. POOR APPETITE OR OVEREATING: 2 - MORE THAN HALF THE DAYS
SUM OF ALL RESPONSES TO PHQ QUESTIONS 1-9: 10
CLINICAL INTERPRETATION OF PHQ2 SCORE: 2

## 2022-01-11 NOTE — PROGRESS NOTES
New Patient Note    Interventional Pain and Spine  Physiatry (Physical Medicine and Rehabilitation)     Patient Name: Kasie Rodriguez  : 1975  Date of Service: 2022  PCP: Leslie Jean-Baptiste D.O.  Referring Provider: Leslie Jean-Baptiste D.O.    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 Low back pain is comparatively worse on average, but her neck pain is worse today.    She reports her pain is located at her right low back and radiates down her right leg.  She also endorses tingling in her anterior thighs bilaterally and lateral right distal calf.  She endorses right leg weakness and has fallen, most recently 3 months ago.  She states she first noticed numbness and tingling in her legs about 5 years ago but that has been progressively worsening.    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  She states this pain radiates into her bilateral hands (R>L) especially her right 2nd-4th fingers.  She also endorses pain, numbness, and tingling in her left thumb and second finger.  She states she has received 2 EMGs in the past.  EMG 2011 reviewed today indicates electrodiagnostic evidence of right sensory radial neuropathy but not median neuropathy or ulnar neuropathy on either side.  Cervical radiculopathy could not be assessed due to patient's difficulty tolerating the needle part of the study.      Of note she reports approximately 1 month of new symptoms including being off balance and nocturnal urinary incontinence.  She denies noticeable change in cognition.  Along with this she has developed breathing difficulty for which she has been referred to a pulmonologist by her  PCP. She denies urinary incontinence during the day, bowel incontinence, or saddle anesthesia.     Her pain at its best-worse level during the course of the day is 5-8/10, respectively. Pain right now is 7/10 on the numeric pain scale. Pain worsens with standing, walking, side bending or twisting, walking upstairs and walking downstairs and improves with nothing. Her pain interferes somewhat with ADLs. The patient otherwise denies new weakness, numbness, or bladder/bowel incontinence. Endorses muscle spasms.     Also notices swelling in hands and calves. Sees rheumatology Dr. Olivo.       G89.4 (ICD-10-CM) - Chronic pain syndrome   M25.50 (ICD-10-CM) - Arthralgia of multiple joints   M53.3 (ICD-10-CM) - Sacroiliac joint dysfunction   M54.12 (ICD-10-CM) - Radiculopathy, cervical   M50.30 (ICD-10-CM) - DDD (degenerative disc disease), cervical     11. Rheumatoid arthritis without rheumatoid factor, multiple sites (HCC)  Not mentioned by patient, but noted in encounter hx, followed by Dr. Olivo-continue care with rheum.     12. Chronic pain syndrome  13. Arthralgia of multiple joints  14. Sacroiliac joint dysfunction  15. Radiculopathy, cervical  16. DDD (degenerative disc disease), cervical  Patient followed by rheumatology, Dr. Olivo, but may benefit from PM&R evaluation of chronic pain. Referred.  - Referral to Pain Management    Limited in physical activity due to being short of breath.    The patient has done physical therapy for this problem for about 1 year. Notes improved posture with PT. Used to go to chiropractor which helped. Last time she went to chiropractor, 3 years ago, she had severe pain and weakness of right side lasting 2 days so she has not returned.    Patient has tried the following medications with varied success (current meds in bold):   Flexeril - takes once per day at night  cymbalta - primarily for depression. No noticeable relief in pain.  Skelaxin - takes once per day in afternoon. helps  when lying still  lyrica - significant relief. Takes 200mg TID    Therapeutic modalities and interventional therapies to date include:  -pt reports having received TPI with no relief for pain level past 6/10  - cervical epidural steroid injection with 3-4 months relief at a time (last had years ago)  -Cervical medial branch blocks x1.  Pt states her insurance changed and couldn't proceed with treatment.   - has not had injections for low back    Medical history includes rheumatoid arthritis followed by rheumatology Dr. Olivo, vitamin D deficiency, insomnia, anxiety, depression, lower extremity edema, IBS, BMI 40.    Psychological testing for pain as depression and pain commonly coexist and need to both be treated.     Opioid Risk Score: 5     Interpretation of Opioid Risk Score   Score 0-3 = Low risk of abuse. Do UDS at least once per year.  Score 4-7 = Moderate risk of abuse. Do UDS 1-4 times per year.  Score 8+ = High risk of abuse. Refer to specialist.    PHQ  Depression Screen (PHQ-2/PHQ-9) 12/10/2021 1/11/2022   PHQ-2 Total Score 6 2   PHQ-9 Total Score 17 10       Interpretation of PHQ-9 Total Score   Score Severity   1-4 No Depression   5-9 Mild Depression   10-14 Moderate Depression   15-19 Moderately Severe Depression   20-27 Severe Depression      Medical records review:  I reviewed the note from the referring provider Leslie Jean-Baptiste D.O. including the note dated 12/10/21.    ROS:   Red Flags ROS:     Fever, Chills, Sweats: Denies  Involuntary Weight Loss: Denies  Bladder Incontinence: endorses  Bowel Incontinence: denies  Saddle Anesthesia: Denies    All other systems reviewed and negative.     PMHx:   Past Medical History:   Diagnosis Date   • Arthralgia of multiple joints 3/8/2011   • Arthritis    • ASTHMA 1/20/2010   • Elevated antinuclear antibody (JOSE C) level 3/8/2011   • Personal history of hydronephrosis    • Rhinitis, allergic 1/20/2010   • Vitamin d deficiency 3/8/2011       PSHx:   Past  Surgical History:   Procedure Laterality Date   • ABDOMINAL HYSTERECTOMY TOTAL      endometriosis and bleeding   • OOPHORECTOMY Bilateral    • SALPINGECTOMY Bilateral    • ABDOMINAL EXPLORATION      endometriosis   • CHOLECYSTECTOMY     • TONSILLECTOMY         Family Hx:   Family History   Problem Relation Age of Onset   • Arthritis Mother    • Heart Disease Mother    • Colon Cancer Father         70s   • Alcohol abuse Maternal Grandmother    • Suicide Attempts Maternal Grandmother          by suicide   • Bipolar disorder Maternal Grandmother    • Alcohol abuse Maternal Grandfather    • Brain Cancer Maternal Grandfather    • Alcohol abuse Paternal Grandmother    • Breast Cancer Paternal Grandmother          of breast CA   • Alcohol abuse Paternal Grandfather    • Heart Disease Paternal Grandfather    • Other Daughter         parotid tumor, vertigo   • Brain Cancer Son        Social Hx:  Social History     Socioeconomic History   • Marital status:      Spouse name: Not on file   • Number of children: 2   • Years of education: Not on file   • Highest education level: Not on file   Occupational History   • Not on file   Tobacco Use   • Smoking status: Never Smoker   • Smokeless tobacco: Never Used   Vaping Use   • Vaping Use: Never used   Substance and Sexual Activity   • Alcohol use: Yes     Comment: 1 glass of wine on occasion   • Drug use: Never   • Sexual activity: Yes     Partners: Male     Birth control/protection: Female Sterilization   Other Topics Concern   • Not on file   Social History Narrative    3 foster children currently.     Social Determinants of Health     Financial Resource Strain:    • Difficulty of Paying Living Expenses: Not on file   Food Insecurity:    • Worried About Running Out of Food in the Last Year: Not on file   • Ran Out of Food in the Last Year: Not on file   Transportation Needs:    • Lack of Transportation (Medical): Not on file   • Lack of Transportation  (Non-Medical): Not on file   Physical Activity:    • Days of Exercise per Week: Not on file   • Minutes of Exercise per Session: Not on file   Stress:    • Feeling of Stress : Not on file   Social Connections:    • Frequency of Communication with Friends and Family: Not on file   • Frequency of Social Gatherings with Friends and Family: Not on file   • Attends [employer] Services: Not on file   • Active Member of Clubs or Organizations: Not on file   • Attends Club or Organization Meetings: Not on file   • Marital Status: Not on file   Intimate Partner Violence:    • Fear of Current or Ex-Partner: Not on file   • Emotionally Abused: Not on file   • Physically Abused: Not on file   • Sexually Abused: Not on file   Housing Stability:    • Unable to Pay for Housing in the Last Year: Not on file   • Number of Places Lived in the Last Year: Not on file   • Unstable Housing in the Last Year: Not on file       Allergies:  Allergies   Allergen Reactions   • Biaxin [Clarithromycin] Vomiting   • Latex        Medications: reviewed on epic.   Outpatient Medications Marked as Taking for the 1/11/22 encounter (Office Visit) with Na Veloz M.D.   Medication Sig Dispense Refill   • tacrolimus (PROTOPIC) 0.1 % Ointment Apply  topically 2 times a day.     • furosemide (LASIX) 20 MG Tab TAKE 1 TABLET BY MOUTH 1 TIME A DAY AS NEEDED (EDEMA). 30 Tablet 0   • methylphenidate (RITALIN) 10 MG Tab      • levalbuterol (XOPENEX HFA) 45 MCG/ACT inhaler Inhale 1-2 Puffs every four hours as needed for Shortness of Breath (asthma symptoms). 30 g 3   • atorvastatin (LIPITOR) 20 MG Tab Take 20 mg by mouth every day.     • REXULTI 3 MG Tab Take 3 mg by mouth every day.     • celecoxib (CELEBREX) 200 MG Cap Take 200 mg by mouth every day.     • cyclobenzaprine (FLEXERIL) 10 mg Tab Take 10 mg by mouth every day.     • DULoxetine (CYMBALTA) 60 MG Cap DR Particles delayed-release capsule Take 120 mg by mouth every day.     • metaxalone (SKELAXIN)  800 MG Tab Take 800 mg by mouth every 12 hours as needed.     • propranolol CR (INDERAL LA) 80 MG CAPSULE SR 24 HR Take 80 mg by mouth every day.     • zolpidem (AMBIEN) 10 MG Tab Take 10 mg by mouth at bedtime as needed for Sleep.     • omeprazole (PRILOSEC) 40 MG delayed-release capsule Take 40 mg by mouth every day.     • pregabalin (LYRICA) 200 MG capsule Take 200-400 mg by mouth 2 times a day. 200mg in the morning and 400mg at night     • ALPRAZolam (XANAX) 1 MG Tab Take 1 mg by mouth every 8 hours as needed for Sleep.     • clotrimazole-betamethasone (LOTRISONE) 1-0.05 % Cream Apply 1 Application topically 2 times a day. 60 g 0   • fluticasone-salmeterol (ADVAIR) 250-50 MCG/DOSE AEROSOL POWDER, BREATH ACTIVATED Inhale 1 Puff every 12 hours. 60 Each 3   • ergocalciferol (DRISDOL) 45838 UNIT capsule Take 1 Cap by mouth. Take 2 a week for one month, then 1 weekly. Refills for 4 at a time 8 Cap 11   • ZYRTEC 10 MG PO TABS Take 10 mg by mouth every day.          Current Outpatient Medications on File Prior to Visit   Medication Sig Dispense Refill   • tacrolimus (PROTOPIC) 0.1 % Ointment Apply  topically 2 times a day.     • furosemide (LASIX) 20 MG Tab TAKE 1 TABLET BY MOUTH 1 TIME A DAY AS NEEDED (EDEMA). 30 Tablet 0   • methylphenidate (RITALIN) 10 MG Tab      • levalbuterol (XOPENEX HFA) 45 MCG/ACT inhaler Inhale 1-2 Puffs every four hours as needed for Shortness of Breath (asthma symptoms). 30 g 3   • atorvastatin (LIPITOR) 20 MG Tab Take 20 mg by mouth every day.     • REXULTI 3 MG Tab Take 3 mg by mouth every day.     • celecoxib (CELEBREX) 200 MG Cap Take 200 mg by mouth every day.     • cyclobenzaprine (FLEXERIL) 10 mg Tab Take 10 mg by mouth every day.     • DULoxetine (CYMBALTA) 60 MG Cap DR Particles delayed-release capsule Take 120 mg by mouth every day.     • metaxalone (SKELAXIN) 800 MG Tab Take 800 mg by mouth every 12 hours as needed.     • propranolol CR (INDERAL LA) 80 MG CAPSULE SR 24 HR Take 80  "mg by mouth every day.     • zolpidem (AMBIEN) 10 MG Tab Take 10 mg by mouth at bedtime as needed for Sleep.     • omeprazole (PRILOSEC) 40 MG delayed-release capsule Take 40 mg by mouth every day.     • pregabalin (LYRICA) 200 MG capsule Take 200-400 mg by mouth 2 times a day. 200mg in the morning and 400mg at night     • ALPRAZolam (XANAX) 1 MG Tab Take 1 mg by mouth every 8 hours as needed for Sleep.     • clotrimazole-betamethasone (LOTRISONE) 1-0.05 % Cream Apply 1 Application topically 2 times a day. 60 g 0   • fluticasone-salmeterol (ADVAIR) 250-50 MCG/DOSE AEROSOL POWDER, BREATH ACTIVATED Inhale 1 Puff every 12 hours. 60 Each 3   • ergocalciferol (DRISDOL) 58178 UNIT capsule Take 1 Cap by mouth. Take 2 a week for one month, then 1 weekly. Refills for 4 at a time 8 Cap 11   • ZYRTEC 10 MG PO TABS Take 10 mg by mouth every day.       No current facility-administered medications on file prior to visit.         EXAMINATION     Physical Exam:   /85 (BP Location: Right arm, Patient Position: Sitting, BP Cuff Size: Adult long)   Pulse 73   Temp 36.4 °C (97.6 °F) (Temporal)   Resp 16   Ht 1.727 m (5' 8\")   Wt 121 kg (266 lb 12.1 oz)   SpO2 93%     Constitutional:   Body Habitus: Body mass index is 40.56 kg/m².  Cooperation: Fully cooperates with exam  Appearance: Well-groomed, well-nourished.    Eyes: No scleral icterus to suggest severe liver disease, no proptosis to suggest severe hyperthyroidism    ENT -no obvious auditory deficits, no noticeable facial droop     Skin -no rashes or lesions noted     Respiratory-  breathing comfortably on room air, no audible wheezing    Cardiovascular-distal extremities warm and well perfused.  No lower extremity edema is noted.     Gastrointestinal - no obvious abdominal masses, non-distended    Psychiatric- alert and oriented ×3. Normal affect.     Gait - no use of ambulatory device, nonantalgic. Gait notable for decreased hip flexion, nearly shuffling her feet.  " Unable to heel walk with difficulty balancing.  Able to rise onto toes, however having difficulty balancing with toe walk.    Musculoskeletal and Neuro -     Cervical spine / upper extremities  Inspection: No deformities of the skin over the cervical spine. No rashes or lesions.    limited active range of motion in all directions.  Forward hunched posture with palpably increased muscular tension throughout posterior upper back.    Spurling's sign  negative bilaterally  Cervical facet loading maneuver  negative bilaterally    No signs of muscular atrophy in bilateral upper extremities     Tenderness to palpation at right paracervical muscle and bilateral upper trapezius.    Impaired sensation to light touch at anterior right upper trapezius, right dorsum of thumb, right lateral shoulder, right anterior wrist    Tinel's at the wrist over the median nerve negative bilaterally  Tinel's at the cubital tunnel: negative bilaterally    Motor Exam Upper Extremities   ? Myotome R L   Shoulder abduction C5 5 5   Elbow flexion C5 5 5   Wrist extension C6 4+* 4+*   Elbow extension C7 5 5   Finger flexion C8 4- 4-   Finger abduction T1 4-^ 4-^   * giveway weakness  ^ Decreased ROM    Reflexes  ?  R L   Biceps  2+ 2+   Brachioradialis  2+ 2+     Smith's sign positive right, negative left        Thoracic/Lumbar Spine/Sacral Spine/Hips   Inspection: No evidence of atrophy in bilateral lower extremities throughout     ROM: limited active range of motion with lumbar flexion, lateral flexion, and rotation bilaterally.   There is limited active range of motion with lumbar extension    Facet loading maneuver negative bilaterally    Impaired sensation to LT at bilateral upper thighs and right distal lateral calf.    Tenderness to palpation over the right low back, no tenderness to palpation at left low back or bilateral sacroiliac joints    Lumbar spine /hip provocative exam maneuvers  Straight leg raise negative bilaterally  Slump-sit  test positive on right, negative on left  FADIR test negative bilaterally    SI joint tests  VIOLETTA test negative bilaterally  Thigh thrust test negative bilaterally    Motor Exam Lower Extremities  ? Myotome R L   Hip flexion L2 5 5   Knee extension L3 5 5   Ankle dorsiflexion L4 5 5   Toe extension L5 5 5   Ankle plantarflexion S1 5 5       Reflexes  ?  R L   Patella  2+ 2+   Achilles   2+ 2+     Clonus of the ankle positive bilaterally       MEDICAL DECISION MAKING    Medical records review: see under HPI section.     DATA    Labs: Personally reviewed at today's visit  Lab Results   Component Value Date/Time    SODIUM 138 01/03/2022 08:28 AM    POTASSIUM 4.6 01/03/2022 08:28 AM    CHLORIDE 103 01/03/2022 08:28 AM    CO2 21 01/03/2022 08:28 AM    ANION 14.0 01/03/2022 08:28 AM    GLUCOSE 107 (H) 01/03/2022 08:28 AM    BUN 19 01/03/2022 08:28 AM    CREATININE 0.91 01/03/2022 08:28 AM    CREATININE 0.91 02/23/2011 09:27 AM    CALCIUM 9.4 01/03/2022 08:28 AM    ASTSGOT 34 12/10/2021 04:31 PM    ALTSGPT 37 12/10/2021 04:31 PM    TBILIRUBIN 0.3 12/10/2021 04:31 PM    ALBUMIN 4.2 12/10/2021 04:31 PM    TOTPROTEIN 6.9 12/10/2021 04:31 PM    GLOBULIN 2.7 12/10/2021 04:31 PM    AGRATIO 1.6 12/10/2021 04:31 PM       No results found for: PROTHROMBTM, INR     Lab Results   Component Value Date/Time    WBC 7.5 12/10/2021 04:31 PM    RBC 5.31 12/10/2021 04:31 PM    HEMOGLOBIN 15.3 12/10/2021 04:31 PM    HEMATOCRIT 45.8 12/10/2021 04:31 PM    MCV 86.3 12/10/2021 04:31 PM    MCH 28.8 12/10/2021 04:31 PM    MCHC 33.4 (L) 12/10/2021 04:31 PM    MPV 10.6 12/10/2021 04:31 PM    NEUTSPOLYS 52.40 12/10/2021 04:31 PM    LYMPHOCYTES 33.80 12/10/2021 04:31 PM    MONOCYTES 6.40 12/10/2021 04:31 PM    EOSINOPHILS 6.10 12/10/2021 04:31 PM    BASOPHILS 0.80 12/10/2021 04:31 PM        No results found for: HBA1C     EMG/NCS 3/16/11 by Dr. Martinez      Imaging:   I personally reviewed following images, these are my reads  X-ray lumbar spine  12/10/2010  Normal alignment.  No evidence of significant facet arthropathy or disc base narrowing.  Mild sclerosis of bilateral sacroiliac joints    MRI cervical spine 3/7/11  Disc bulge at C3-4, C4-5, C5-6, C6-7 with Modic changes at C6-7.  Mild-moderate neuroforaminal stenosis at right C5-C6.  Very mild spinal canal stenosis at C4-5 and C6-7. Decreased cervical lordosis    IMAGING radiology reads. I reviewed the following radiology reads   MR-CERVICAL SPINE-W/O 03/07/11    FINDINGS:   The cervical spine maintains normal height and alignment.     At the level of C2-3, there is no spinal or neural foraminal stenosis.     At the level of C3-4, there is mild disk bulge causing mild effacement of   the anterior thecal sac.     At the level of C4-5, there is mild disk bulge causing mild spinal canal   stenosis.  There is flattening of the anterior spinal cord contour.     At the level of C5-6, there is mild disk bulge causing effacement of the   anterior thecal sac.  There is mild flattening of the anterior spinal   cord contour.     At the level of C6-7, there is disk degeneration.  There is diffuse disk   bulge causing mild spinal canal stenosis.  There is flattening of the   anterior spinal cord contour.     At the level of C7-T1, there is no spinal or neural foraminal stenosis.     The visualized posterior fossa structures appear normal within limits.     The cervical spinal cord does not demonstrate any mass or abnormal T2   signal intensity.     The visualized pre-and paraspinal soft tissues appear normal within   limits.         IMPRESSION:   Mild degenerative changes in the cervical spine as described above.      XR lumbar spine 12/10/2010  FINDINGS:   The bony trabecular pattern is normal.  The lumbar vertebral bodies have   a normal height and alignment.  The disc spaces are well maintained and   symmetrical.  There is no evidence of spondylolisthesis, spondylolysis,   or osseous lesion.  The posterior  elements are unremarkable.   Mildly sclerotic SI joints.         IMPRESSION:   Normal complete lumbar spine series.  Mildly sclerotic SI joints.        Diagnosis  Visit Diagnoses     ICD-10-CM   1. Lumbar radiculopathy  M54.16   2. Cervical radiculopathy  M54.12   3. Chronic pain syndrome  G89.4   4. Rheumatoid arthritis without rheumatoid factor, multiple sites (HCC)  M06.09   5. DDD (degenerative disc disease), cervical  M50.30   6. Numbness and tingling  R20.0    R20.2   7. Balance problem  R26.89         ASSESSMENT AND PLAN:  Kasie Rodriguez is a 46 y.o. female with history of rheumatoid arthritis followed by rheumatology Dr. Olivo, vitamin D deficiency, insomnia, anxiety, depression, lower extremity edema, IBS, BMI 40 who presents with the followin) Pain radiating from right low back down right leg with numbness at bilateral anterior thighs and right lateral distal calf, and new weakness especially noticeable with walking.  Positive right slump test on exam today, suggestive of lumbar radiculopathy.   2) Acute on chronic neck pain radiating into bilateral hands (R>L) and numbness at right 2nd-4th digits and weakness of bilateral hand , suggestive of cervical radiculopathy.  Patient has had EMGs for the symptoms in the past; EMG  ruled out evidence of carpal tunnel syndrome or ulnar neuropathy.  3) Pt endorses approximately 1 month of new balance impairment and urinary incontinence at night. Denies noticeable cognitive changes. On exam she has a positive right Hoffmans and 1 beat ankle clonus b/l. Gait notable for decreased hip flexion bilaterally and nearly shuffling her feet. Discussed my impression that perhaps a brain MRI could be worthwhile to obtain. I have contacted patient's PCP Dr. Leslie Jean-Baptiste to share my impression.    Pt also notes approximately 1 month of increased breathing difficulty which her PCP is aware and she will be seeing pulmonology soon.       Kasie was seen today  for new patient.    Diagnoses and all orders for this visit:    Lumbar radiculopathy  -     MR-LUMBAR SPINE-W/O; Future    Cervical radiculopathy  -     MR-CERVICAL SPINE-W/O; Future    Chronic pain syndrome    Rheumatoid arthritis without rheumatoid factor, multiple sites (HCC)    DDD (degenerative disc disease), cervical    Numbness and tingling    Balance problem      The above note documents my personal evaluation of this patient. In addition, I have reviewed and confirmed with the patient and MA the supportive information documented in today's Patient Health Questionnaire and Office Note.       PLAN  Physical Therapy: Defer physical therapy for now given patient's respiratory difficulty which is currently being worked up.  Discussed that I think physical therapy would be helpful in the future after her respiratory status improves.    Diagnostic workup: MRI lumbar and cervical spine ordered.  Previous imaging, x-ray lumbar spine, and MRI cervical spine reviewed today.    Medications:  - cont lyrica, Flexeril, Cymbalta, Skelaxin, celebrex  - trial low dose naltrexone 4.5mg daily.  Prescription sent into [facility] pharmacy.  30 tablets, with 2 refills.  Discussed that low-dose naltrexone (4.5 mg daily) is currently supported by research to possibly be effective for neuropathic pain and decrease neuroinflammation but is not currently covered by insurance for this indication.   Discussed potential side effects of nightmares and counseled her that she may elect to discontinue this medication if she feels her side effects are too severe  - consider taking tylenol as needed up to 4 grams per day, in divided doses at least 6 hours apart. Do not take more than 1 gram at a time.    Interventions: Pending MRI review    Referrals: Patient declined referral for repeat EMG/NCS.  She reports she has had this twice in the past and was difficult to tolerate.    Other:  - I have messaged patient's PCP Dr. Leslie Jean-Baptiste  to share my impression: Pt endorses approximately 1 month of new balance impairment and urinary incontinence at night. Denies noticeable cognitive changes. On exam she has a positive right Hoffmans and 1 beat ankle clonus b/l. Gait notable for decreased hip flexion bilaterally and nearly shuffling her feet. Discussed my impression that perhaps a brain MRI could be worthwhile to obtain.  - Continue follow-up with rheumatology Dr. Olivo    Outside records requested:  The patient signed outside records request form for her outside records including outside images. This includes the records from [place]    Follow-up: 1 month or sooner as needed    Orders Placed This Encounter   • MR-LUMBAR SPINE-W/O   • MR-CERVICAL SPINE-W/O   • tacrolimus (PROTOPIC) 0.1 % Ointment       Na Veloz MD  Interventional Pain Management  Physical Medicine and Rehabilitation  [facility]    Leslie Zambrano, D.O.     Total time: 64 minutes. I spent greater than 50% of the time for patient care and coordination on this date, including face-to-face time with the patient as per assessment and plan above.     Please note that this dictation was created using voice recognition software. I have made every reasonable attempt to correct obvious errors, but I expect that there are errors of grammar and possibly content that I did not discover before finalizing the note.

## 2022-01-11 NOTE — Clinical Note
Trino Jean-Baptiste,    Thanks for referring Kasie Rodriugez to my office. My current impression is lumbar radiculopathy and cervical radiculopathy.  I have ordered a MRI of her lumbar and cervical spine.  She did not want to proceed with a repeat EMG.  I also would like to eventually refer to physical therapy when her respiratory status improves. I started a trial of low-dose naltrexone for her neuropathic pain.    I am concerned about her 1 month history of new balance impairment and urinary incontinence at night.  She denies saddle anesthesia or incontinence during the day.  On exam she has positive right Shaunna's and one beat of ankle clonus bilaterally. Her gait was notable for decreased hip flexion, nearly shuffling her feet. I discussed with her that I think a brain MRI might be worthwhile to obtain but I would defer to your expertise.  NPH would be in my differential although she denies cognitive changes.  I told her that I would share my impression with you.    I will see her back in 1 month.    Na

## 2022-01-11 NOTE — PATIENT INSTRUCTIONS
- take tylenol as needed up to 4 grams per day, in divided doses at least 6 hours apart. Do not take more than 1 gram at a time.    Try low dose naltrexone for neuropathic pain. This is currently supported by research to possibly be effective for neuropathic pain and decrease neuroinflammation but is not currently covered by insurance for this indication. Potential side effects of nightmares, you can discontinue this medication if she feels her side effects are too severe    [facility]  PHARMACY  [phone]  Fax: [phone] [address]  [place] [address]  Monday - Friday 9am to 6pm  Closed for Lunch 1pm-2pm

## 2022-01-13 ENCOUNTER — APPOINTMENT (OUTPATIENT)
Dept: RADIOLOGY | Facility: MEDICAL CENTER | Age: 47
End: 2022-01-13
Attending: STUDENT IN AN ORGANIZED HEALTH CARE EDUCATION/TRAINING PROGRAM
Payer: COMMERCIAL

## 2022-01-13 DIAGNOSIS — M54.16 LUMBAR RADICULOPATHY: ICD-10-CM

## 2022-01-13 DIAGNOSIS — M54.12 CERVICAL RADICULOPATHY: ICD-10-CM

## 2022-01-13 PROCEDURE — 72141 MRI NECK SPINE W/O DYE: CPT

## 2022-01-13 PROCEDURE — 72148 MRI LUMBAR SPINE W/O DYE: CPT

## 2022-01-18 ENCOUNTER — APPOINTMENT (OUTPATIENT)
Dept: PULMONOLOGY | Facility: MEDICAL CENTER | Age: 47
End: 2022-01-18
Attending: STUDENT IN AN ORGANIZED HEALTH CARE EDUCATION/TRAINING PROGRAM
Payer: COMMERCIAL

## 2022-01-20 ENCOUNTER — OFFICE VISIT (OUTPATIENT)
Dept: MEDICAL GROUP | Facility: PHYSICIAN GROUP | Age: 47
End: 2022-01-20
Payer: COMMERCIAL

## 2022-01-20 ENCOUNTER — HOSPITAL ENCOUNTER (OUTPATIENT)
Facility: MEDICAL CENTER | Age: 47
End: 2022-01-20
Attending: STUDENT IN AN ORGANIZED HEALTH CARE EDUCATION/TRAINING PROGRAM
Payer: COMMERCIAL

## 2022-01-20 VITALS
DIASTOLIC BLOOD PRESSURE: 76 MMHG | BODY MASS INDEX: 40.38 KG/M2 | TEMPERATURE: 97.9 F | SYSTOLIC BLOOD PRESSURE: 118 MMHG | WEIGHT: 266.4 LBS | OXYGEN SATURATION: 94 % | RESPIRATION RATE: 18 BRPM | HEART RATE: 62 BPM | HEIGHT: 68 IN

## 2022-01-20 DIAGNOSIS — R42 DIZZINESS: ICD-10-CM

## 2022-01-20 DIAGNOSIS — L30.4 INTERTRIGO: ICD-10-CM

## 2022-01-20 DIAGNOSIS — I63.9 CEREBELLAR INFARCT (HCC): ICD-10-CM

## 2022-01-20 DIAGNOSIS — R32 URINARY INCONTINENCE, UNSPECIFIED TYPE: ICD-10-CM

## 2022-01-20 DIAGNOSIS — R26.9 GAIT ABNORMALITY: ICD-10-CM

## 2022-01-20 DIAGNOSIS — R06.09 DYSPNEA ON EXERTION: ICD-10-CM

## 2022-01-20 DIAGNOSIS — R60.0 LOWER EXTREMITY EDEMA: ICD-10-CM

## 2022-01-20 DIAGNOSIS — Z23 NEED FOR VACCINATION: ICD-10-CM

## 2022-01-20 DIAGNOSIS — Z02.89 ENCOUNTER FOR COMPLETION OF FORM WITH PATIENT: ICD-10-CM

## 2022-01-20 PROCEDURE — 81003 URINALYSIS AUTO W/O SCOPE: CPT

## 2022-01-20 PROCEDURE — 90471 IMMUNIZATION ADMIN: CPT | Performed by: STUDENT IN AN ORGANIZED HEALTH CARE EDUCATION/TRAINING PROGRAM

## 2022-01-20 PROCEDURE — 90732 PPSV23 VACC 2 YRS+ SUBQ/IM: CPT | Performed by: STUDENT IN AN ORGANIZED HEALTH CARE EDUCATION/TRAINING PROGRAM

## 2022-01-20 PROCEDURE — 90472 IMMUNIZATION ADMIN EACH ADD: CPT | Performed by: STUDENT IN AN ORGANIZED HEALTH CARE EDUCATION/TRAINING PROGRAM

## 2022-01-20 PROCEDURE — 99214 OFFICE O/P EST MOD 30 MIN: CPT | Mod: 25 | Performed by: STUDENT IN AN ORGANIZED HEALTH CARE EDUCATION/TRAINING PROGRAM

## 2022-01-20 PROCEDURE — 90715 TDAP VACCINE 7 YRS/> IM: CPT | Performed by: STUDENT IN AN ORGANIZED HEALTH CARE EDUCATION/TRAINING PROGRAM

## 2022-01-20 RX ORDER — FUROSEMIDE 20 MG/1
20 TABLET ORAL
Qty: 90 TABLET | Refills: 0 | Status: SHIPPED | OUTPATIENT
Start: 2022-01-20 | End: 2022-03-30

## 2022-01-20 RX ORDER — CALCIPOTRIENE 50 UG/G
CREAM TOPICAL
COMMUNITY
Start: 2022-01-04 | End: 2022-08-16

## 2022-01-20 RX ORDER — NYSTATIN 100000 [USP'U]/G
1 POWDER TOPICAL 3 TIMES DAILY
Qty: 30 G | Refills: 1 | Status: SHIPPED | OUTPATIENT
Start: 2022-01-20 | End: 2022-02-15

## 2022-01-20 ASSESSMENT — FIBROSIS 4 INDEX: FIB4 SCORE: 1.41

## 2022-01-20 NOTE — PATIENT INSTRUCTIONS
[facility]  [address] [place] [address]  [place] [id number]  Phone: [phone]

## 2022-01-20 NOTE — PROGRESS NOTES
Subjective:     CC: Follow-up    HPI:   Kasie presents today for follow-up.    Since her last visit patient has not been able to make a appointment with psychiatry.  Continues on her current medications.  She was able to see the physiatrist and had an MRI of her cervical spine.    She states that she continues with dyspnea on exertion, denies at rest.  She does state that the furosemide has been helpful for her lower extremity edema and her breathing as well, did not take this morning.  She does report that recently, perhaps since she started the diuretic she has been having worsening urinary incontinence.  She reports a history prior of urinary urge incontinence but now it has become more significant and has to wear a pad during the day/night.  Denies any other urinary symptoms or abdominal pain.  Denies any wheezing, cough or chest pain.  More recently has felt that her balance has been off and reports some dizziness at times.    She also would like something for irritated/itchy rash underneath her abdominal pannus/breast.  Has tried over-the-counter medications without improvement.  She states that the area is moist and she is been trying to use [employer] without much improvement.    Current Outpatient Medications Ordered in Epic   Medication Sig Dispense Refill   • calcipotriene (DOVONEX) 0.005 % Cream      • Acetaminophen (TYLENOL PO) Take  by mouth.     • furosemide (LASIX) 20 MG Tab Take 1 Tablet by mouth 1 time a day as needed (edema). 90 Tablet 0   • nystatin (MYCOSTATIN) powder Apply 1 g topically in the morning, at noon, and at bedtime. To affected areas until healed 30 g 1   • tacrolimus (PROTOPIC) 0.1 % Ointment Apply  topically 2 times a day.     • methylphenidate (RITALIN) 10 MG Tab      • levalbuterol (XOPENEX HFA) 45 MCG/ACT inhaler Inhale 1-2 Puffs every four hours as needed for Shortness of Breath (asthma symptoms). 30 g 3   • atorvastatin (LIPITOR) 20 MG Tab Take 20 mg by mouth every day.     •  "REXULTI 3 MG Tab Take 3 mg by mouth every day.     • celecoxib (CELEBREX) 200 MG Cap Take 200 mg by mouth every day.     • cyclobenzaprine (FLEXERIL) 10 mg Tab Take 10 mg by mouth every day.     • DULoxetine (CYMBALTA) 60 MG Cap DR Particles delayed-release capsule Take 120 mg by mouth every day.     • metaxalone (SKELAXIN) 800 MG Tab Take 800 mg by mouth every 12 hours as needed.     • propranolol CR (INDERAL LA) 80 MG CAPSULE SR 24 HR Take 80 mg by mouth every day.     • zolpidem (AMBIEN) 10 MG Tab Take 10 mg by mouth at bedtime as needed for Sleep.     • omeprazole (PRILOSEC) 40 MG delayed-release capsule Take 40 mg by mouth every day.     • pregabalin (LYRICA) 200 MG capsule Take 200-400 mg by mouth 2 times a day. 200mg in the morning and 400mg at night     • ALPRAZolam (XANAX) 1 MG Tab Take 1 mg by mouth every 8 hours as needed for Sleep.     • clotrimazole-betamethasone (LOTRISONE) 1-0.05 % Cream Apply 1 Application topically 2 times a day. 60 g 0   • fluticasone-salmeterol (ADVAIR) 250-50 MCG/DOSE AEROSOL POWDER, BREATH ACTIVATED Inhale 1 Puff every 12 hours. 60 Each 3   • ergocalciferol (DRISDOL) 36972 UNIT capsule Take 1 Cap by mouth. Take 2 a week for one month, then 1 weekly. Refills for 4 at a time 8 Cap 11   • ZYRTEC 10 MG PO TABS Take 10 mg by mouth every day.       No current [facility]-ordered facility-administered medications on file.     ROS:  Gen: no fevers/chills, no changes in weight  Eyes: no changes in vision  ENT: no sore throat  Pulm: no sob at rest, no cough  CV: no chest pain, no palpitations  GI: no nausea/vomiting, no diarrhea  : no dysuria  Neuro: no headaches    Objective:     Exam:  /76 (BP Location: Left arm, Patient Position: Sitting, BP Cuff Size: Large adult)   Pulse 62   Temp 36.6 °C (97.9 °F) (Temporal)   Resp 18   Ht 1.727 m (5' 8\")   Wt 121 kg (266 lb 6.4 oz)   LMP 07/28/2006   SpO2 94%   BMI 40.51 kg/m²  Body mass index is 40.51 kg/m².    Physical " Exam:  Constitutional: Well-developed and well-nourished. No acute distress.   Skin: Skin is warm and dry. No rash noted.  Head: Atraumatic without lesions.  Eyes: Conjunctivae and extraocular motions are normal. Pupils are equal, round. No scleral icterus.   Ears:  External ears unremarkable. Tympanic membranes clear and intact.  Nose: Nares patent. No discharge.  Mouth/Throat: Oropharynx is clear and moist. Posterior pharynx without erythema or exudates.  Neck: Supple, trachea midline. Normal range of motion. No thyromegaly present. No lymphadenopathy--cervical or supraclavicular.  Cardiovascular: Regular rate and rhythm, S1 and S2 without murmur, rubs, or gallops.  Lungs: Poor respiratory effort for majority of exam, but good aeration, CTA bilaterally, no wheezes/rhonchi/rales.  Decreased in the left base.  Extremities: No cyanosis, clubbing, erythema, nor edema.  Neurological: Alert and oriented x 3.  Cranial nerves II through XII intact aside from not able to test extraocular movements (with normal EOM when not actively testing when testing patient at first able to move eyes then was unable to move eyes at all and looked straight ahead without moving eyes requested).  Walks with cane with shuffling gait, not wide-based stance.  Equivocal Romberg.  Psychiatric:  Appearance: Clothing and grooming normal.  Mood: 'ok'  Behavior: Patient late to her scheduled appointment. No psychomotor abnormalities or impulsivity. Attention is good. Patient is pleasant and cooperative.   Eye contact: good   Affect: normal and flat  Speech/thought content: Normal speech pattern. Normal insight and reasoning, no evidence of psychotic process. Doesn't endorse suicidal or homicidal thoughts.    Labs: Reviewed from 12/10/2021, 1/3/2022  Imaging: Chest x-ray reviewed from 12/10/2021, MRI cervical spine reviewed 1/13/2022, echocardiogram reviewed 10/22/2021    Assessment & Plan:     46 y.o. female with the following -     1. Dyspnea on  exertion  Ongoing issue since at least October.  Approximately 3 weeks ago she had a course of prednisone and we restarted her Advair with improvement.  She has had a echocardiogram which was not revealing despite lower extremity edema which began around the same time as well.  She denies any chest pain with exertion but we might consider in addition to the pulmonary function test that she has scheduled on the 22nd to also obtain nuclear stress test.  We will continue furosemide as she is reported improvement from it.  Continue with her Advair with as needed albuterol, does have an appoint with pulmonary but not till May.  - NM-HEART MUSCLE IMAGE,SPECT MULT; Future  - furosemide (LASIX) 20 MG Tab; Take 1 Tablet by mouth 1 time a day as needed (edema).  Dispense: 90 Tablet; Refill: 0    2. Lower extremity edema  Ongoing since at least October of last year, well-controlled with furosemide as below.  Continue.  - furosemide (LASIX) 20 MG Tab; Take 1 Tablet by mouth 1 time a day as needed (edema).  Dispense: 90 Tablet; Refill: 0    3. Gait abnormality  4. Dizziness  5. Cerebellar infarct (HCC)  6. Urinary incontinence, unspecified type  This has been for the past 1 to 2 months, slightly more dizzy recently.  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  Urinary incontinence may possibly just be due to diuretic as above, will check urine analysis to exclude alternative causes and evaluate for NPH as well with MRI.  Discussed return precautions should her symptoms worsen may need imaging stat instead of urgent.  Continue use of cane.  Would consider neurology referral.  - MR-BRAIN-W/O; Future  - URINALYSIS,CULTURE IF INDICATED; Future    7. Intertrigo  This is an acute condition, over-the-counter is not helpful so we will trial nystatin as below.  - nystatin (MYCOSTATIN) powder; Apply 1 g topically in the morning, at noon, and at bedtime. To  affected areas until healed  Dispense: 30 g; Refill: 1    8. Need for vaccination  Influenza vaccine not available today.  - Tdap =>6yo IM  - PneumoVax PPV23 =>1yo    9. Encounter for completion of form with patient  DMV form filled out for patient.    I spent a total of 38 minutes with record review, exam, communication with the patient, and documentation of this encounter.    Return in 3 weeks (on 2/10/2022), or if symptoms worsen or fail to improve.    Please note that this dictation was created using voice recognition software. I have made every reasonable attempt to correct obvious errors, but I expect that there are errors of grammar and possibly content that I did not discover before finalizing the note.

## 2022-01-21 LAB
APPEARANCE UR: CLEAR
BILIRUB UR QL STRIP.AUTO: NEGATIVE
COLOR UR: YELLOW
GLUCOSE UR STRIP.AUTO-MCNC: NEGATIVE MG/DL
KETONES UR STRIP.AUTO-MCNC: NEGATIVE MG/DL
LEUKOCYTE ESTERASE UR QL STRIP.AUTO: NEGATIVE
MICRO URNS: NORMAL
NITRITE UR QL STRIP.AUTO: NEGATIVE
PH UR STRIP.AUTO: 6 [PH] (ref 5–8)
PROT UR QL STRIP: NEGATIVE MG/DL
RBC UR QL AUTO: NEGATIVE
SP GR UR STRIP.AUTO: 1.01
UROBILINOGEN UR STRIP.AUTO-MCNC: 0.2 MG/DL

## 2022-01-22 ENCOUNTER — HOSPITAL ENCOUNTER (OUTPATIENT)
Dept: PULMONOLOGY | Facility: MEDICAL CENTER | Age: 47
End: 2022-01-22
Attending: STUDENT IN AN ORGANIZED HEALTH CARE EDUCATION/TRAINING PROGRAM
Payer: COMMERCIAL

## 2022-01-22 DIAGNOSIS — J45.40 MODERATE PERSISTENT ASTHMA WITHOUT COMPLICATION: ICD-10-CM

## 2022-01-22 DIAGNOSIS — R06.09 DYSPNEA ON EXERTION: ICD-10-CM

## 2022-01-26 ENCOUNTER — HOSPITAL ENCOUNTER (OUTPATIENT)
Dept: RADIOLOGY | Facility: MEDICAL CENTER | Age: 47
End: 2022-01-26
Attending: STUDENT IN AN ORGANIZED HEALTH CARE EDUCATION/TRAINING PROGRAM
Payer: COMMERCIAL

## 2022-01-26 DIAGNOSIS — R32 URINARY INCONTINENCE, UNSPECIFIED TYPE: ICD-10-CM

## 2022-01-26 DIAGNOSIS — I63.9 CEREBELLAR INFARCT (HCC): ICD-10-CM

## 2022-01-26 DIAGNOSIS — R26.9 GAIT ABNORMALITY: ICD-10-CM

## 2022-01-26 PROCEDURE — 70551 MRI BRAIN STEM W/O DYE: CPT

## 2022-01-28 ENCOUNTER — OFFICE VISIT (OUTPATIENT)
Dept: BEHAVIORAL HEALTH | Facility: CLINIC | Age: 47
End: 2022-01-28
Payer: COMMERCIAL

## 2022-01-28 DIAGNOSIS — F33.2 SEVERE EPISODE OF RECURRENT MAJOR DEPRESSIVE DISORDER, WITHOUT PSYCHOTIC FEATURES (HCC): ICD-10-CM

## 2022-01-28 DIAGNOSIS — F41.1 GENERALIZED ANXIETY DISORDER: ICD-10-CM

## 2022-01-28 DIAGNOSIS — F43.10 PTSD (POST-TRAUMATIC STRESS DISORDER): ICD-10-CM

## 2022-01-28 DIAGNOSIS — U09.9 LONG COVID: ICD-10-CM

## 2022-01-28 PROCEDURE — 90792 PSYCH DIAG EVAL W/MED SRVCS: CPT | Performed by: PSYCHIATRY & NEUROLOGY

## 2022-01-28 RX ORDER — ALPRAZOLAM 1 MG/1
1 TABLET ORAL NIGHTLY PRN
Qty: 15 TABLET | Refills: 0 | Status: SHIPPED | OUTPATIENT
Start: 2022-01-28 | End: 2022-04-04

## 2022-01-28 RX ORDER — PRAZOSIN HYDROCHLORIDE 2 MG/1
CAPSULE ORAL
Qty: 53 CAPSULE | Refills: 0 | Status: SHIPPED | OUTPATIENT
Start: 2022-01-28 | End: 2022-02-15

## 2022-01-28 RX ORDER — ZOLPIDEM TARTRATE 10 MG/1
10 TABLET ORAL NIGHTLY PRN
Qty: 30 TABLET | Refills: 0 | Status: SHIPPED | OUTPATIENT
Start: 2022-01-28 | End: 2022-03-07

## 2022-01-28 RX ORDER — BUPROPION HYDROCHLORIDE 150 MG/1
150 TABLET ORAL EVERY MORNING
Qty: 30 TABLET | Refills: 1 | Status: SHIPPED | OUTPATIENT
Start: 2022-01-28 | End: 2022-02-22

## 2022-01-28 ASSESSMENT — ANXIETY QUESTIONNAIRES
4. TROUBLE RELAXING: NEARLY EVERY DAY
6. BECOMING EASILY ANNOYED OR IRRITABLE: NEARLY EVERY DAY
GAD7 TOTAL SCORE: 18
5. BEING SO RESTLESS THAT IT IS HARD TO SIT STILL: NEARLY EVERY DAY
IF YOU CHECKED OFF ANY PROBLEMS ON THIS QUESTIONNAIRE, HOW DIFFICULT HAVE THESE PROBLEMS MADE IT FOR YOU TO DO YOUR WORK, TAKE CARE OF THINGS AT HOME, OR GET ALONG WITH OTHER PEOPLE: VERY DIFFICULT
1. FEELING NERVOUS, ANXIOUS, OR ON EDGE: NEARLY EVERY DAY
3. WORRYING TOO MUCH ABOUT DIFFERENT THINGS: MORE THAN HALF THE DAYS
2. NOT BEING ABLE TO STOP OR CONTROL WORRYING: MORE THAN HALF THE DAYS
7. FEELING AFRAID AS IF SOMETHING AWFUL MIGHT HAPPEN: MORE THAN HALF THE DAYS

## 2022-01-28 ASSESSMENT — PATIENT HEALTH QUESTIONNAIRE - PHQ9
CLINICAL INTERPRETATION OF PHQ2 SCORE: 4
SUM OF ALL RESPONSES TO PHQ QUESTIONS 1-9: 16
5. POOR APPETITE OR OVEREATING: 2 - MORE THAN HALF THE DAYS

## 2022-01-28 NOTE — PATIENT INSTRUCTIONS
PLAN:  • Start wellbutrin 150mg daily for augmentation  • Start prazosin 2mg QHS for trauma nightmares; may increase to 4mg QHS after 1 week  • Referred for ketamine therapy at [facility]; call to confirm receipt of referral  • Continue duloxetine 120mg daily  • Continue zolpidem 10mg QHS  • Continue alprazolam 1mg QHS PRN   • STOP methylphenidate
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
## 2022-01-31 ENCOUNTER — DOCUMENTATION (OUTPATIENT)
Dept: BEHAVIORAL HEALTH | Facility: CLINIC | Age: 47
End: 2022-01-31

## 2022-02-01 ENCOUNTER — TELEPHONE (OUTPATIENT)
Dept: MEDICAL GROUP | Facility: PHYSICIAN GROUP | Age: 47
End: 2022-02-01

## 2022-02-01 DIAGNOSIS — R90.89 ABNORMAL BRAIN MRI: ICD-10-CM

## 2022-02-01 DIAGNOSIS — R26.9 GAIT ABNORMALITY: ICD-10-CM

## 2022-02-01 PROBLEM — I63.9 CEREBELLAR INFARCT (HCC): Status: RESOLVED | Noted: 2022-01-20 | Resolved: 2022-02-01

## 2022-02-01 NOTE — TELEPHONE ENCOUNTER
Contacted patient with results. Questions answered. Will have MRI/patient evaluated further by neurosurgery.    IMPRESSION:  1.  10 mm x 7 mm x 7 mm notch-like defect in the posterior-inferior midline cerebellar vermis. This was not present on MRI cervical spine from 3/7/2011. The lesion is isointense with CSF and appears contiguous with cisterna magna. Differential considerations would include development of an arachnoid cyst invaginating the cerebellar vermis versus macrocystic encephalomalacia extending to the vermian surface consistent with an old infarct or other remote cerebellar vermis insult. Uncertain whether this may be related to the patient's unsteady gait.

## 2022-02-07 RX ORDER — CYCLOBENZAPRINE HCL 10 MG
10 TABLET ORAL DAILY
Qty: 90 TABLET | Refills: 2 | Status: SHIPPED | OUTPATIENT
Start: 2022-02-07 | End: 2022-03-08 | Stop reason: SDUPTHER

## 2022-02-11 ENCOUNTER — OFFICE VISIT (OUTPATIENT)
Dept: PHYSICAL MEDICINE AND REHAB | Facility: MEDICAL CENTER | Age: 47
End: 2022-02-11
Payer: COMMERCIAL

## 2022-02-11 VITALS
RESPIRATION RATE: 17 BRPM | WEIGHT: 266.1 LBS | HEART RATE: 72 BPM | OXYGEN SATURATION: 93 % | DIASTOLIC BLOOD PRESSURE: 64 MMHG | SYSTOLIC BLOOD PRESSURE: 142 MMHG | HEIGHT: 68 IN | TEMPERATURE: 97.7 F | BODY MASS INDEX: 40.33 KG/M2

## 2022-02-11 DIAGNOSIS — M06.09 RHEUMATOID ARTHRITIS WITHOUT RHEUMATOID FACTOR, MULTIPLE SITES (HCC): ICD-10-CM

## 2022-02-11 DIAGNOSIS — R20.2 NUMBNESS AND TINGLING: ICD-10-CM

## 2022-02-11 DIAGNOSIS — G89.4 CHRONIC PAIN SYNDROME: ICD-10-CM

## 2022-02-11 DIAGNOSIS — R20.0 NUMBNESS AND TINGLING: ICD-10-CM

## 2022-02-11 DIAGNOSIS — M79.10 MYALGIA: ICD-10-CM

## 2022-02-11 DIAGNOSIS — R26.9 GAIT ABNORMALITY: ICD-10-CM

## 2022-02-11 DIAGNOSIS — R26.89 BALANCE PROBLEM: ICD-10-CM

## 2022-02-11 DIAGNOSIS — M47.816 LUMBAR SPONDYLOSIS: Primary | ICD-10-CM

## 2022-02-11 DIAGNOSIS — M50.30 DDD (DEGENERATIVE DISC DISEASE), CERVICAL: ICD-10-CM

## 2022-02-11 DIAGNOSIS — R90.89 ABNORMAL BRAIN MRI: ICD-10-CM

## 2022-02-11 PROCEDURE — 99214 OFFICE O/P EST MOD 30 MIN: CPT | Performed by: STUDENT IN AN ORGANIZED HEALTH CARE EDUCATION/TRAINING PROGRAM

## 2022-02-11 ASSESSMENT — FIBROSIS 4 INDEX: FIB4 SCORE: 1.41

## 2022-02-11 ASSESSMENT — PATIENT HEALTH QUESTIONNAIRE - PHQ9
SUM OF ALL RESPONSES TO PHQ QUESTIONS 1-9: 9
CLINICAL INTERPRETATION OF PHQ2 SCORE: 2
5. POOR APPETITE OR OVEREATING: 1 - SEVERAL DAYS

## 2022-02-11 NOTE — PROGRESS NOTES
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 Low back pain is comparatively worse on average, but her neck pain is worse today.     She reports her pain is located at her right low back and radiates down her right leg.  She also endorses tingling in her anterior thighs bilaterally and lateral right distal calf.  She endorses right leg weakness and has fallen, most recently 3 months ago.  She states she first noticed numbness and tingling in her legs about 5 years ago but that has been progressively worsening.     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  She states this pain radiates into her bilateral hands (R>L) especially her right 2nd-4th fingers.  She also endorses pain, numbness, and tingling in her left thumb and second finger.  She states she has received 2 EMGs in the past.  EMG 2011 reviewed today indicates electrodiagnostic evidence of right sensory radial neuropathy but not median neuropathy or ulnar neuropathy on either side.  Cervical radiculopathy could not be assessed due to patient's difficulty tolerating the needle part of the study.       Of note she reports approximately 1 month of new symptoms including being off balance and nocturnal urinary incontinence.  She denies noticeable change in cognition.  Along with this she has developed breathing difficulty for which she has been referred to a pulmonologist by her PCP. She denies urinary incontinence during the day, bowel incontinence, or saddle anesthesia.      Her pain at its  best-worse level during the course of the day is 5-8/10, respectively. Pain right now is 7/10 on the numeric pain scale. Pain worsens with standing, walking, side bending or twisting, walking upstairs and walking downstairs and improves with nothing. Her pain interferes somewhat with ADLs. The patient otherwise denies new weakness, numbness, or bladder/bowel incontinence. Endorses muscle spasms.      Also notices swelling in hands and calves. Sees rheumatology Dr. Olivo.     Limited in physical activity due to being short of breath.     The patient has done physical therapy for this problem for about 1 year. Notes improved posture with PT. Used to go to chiropractor which helped. Last time she went to chiropractor, 3 years ago, she had severe pain and weakness of right side lasting 2 days so she has not returned.     Patient has tried the following medications with varied success (current meds in bold):   Flexeril - takes once per day at night  cymbalta - primarily for depression. No noticeable relief in pain.  Skelaxin - takes once per day in afternoon. helps when lying still  lyrica - significant relief. Takes 200mg TID     Therapeutic modalities and interventional therapies to date include:  -pt reports having received TPI with no relief for pain level past 6/10  - cervical epidural steroid injection with 3-4 months relief at a time (last had years ago)  -Cervical medial branch blocks x1.  Pt states her insurance changed and couldn't proceed with treatment.   - has not had injections for low back    HPI  Today's visit:  Kasie Rodriguez is a 46 y.o. female with The primary encounter diagnosis was Lumbar spondylosis. Diagnoses of Abnormal brain MRI, Rheumatoid arthritis without rheumatoid factor, multiple sites (HCC), and Chronic pain syndrome were also pertinent to this visit.    Kasie Rodriguez presents today with pain at her neck and right low back that do not radiate. Pain severity has not changed  since her last visit. She has tried taking tylenol and low dose naltrexone with no improvement. Denies new numbness, tingling, or weakness. Ongoing gait instability.    Since last visit got lumbar and cervical MRI which showed lesion in cerebellum. Since then has received MRI brain. Pt awaiting [facility] appt for further eval.    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      ROS:   Red Flags ROS:  Fever, Chills, Sweats: Denies  Involuntary Weight Loss: Denies  Bladder Incontinence: Denies  Bowel Incontinence: denies  Saddle Anesthesia: Denies    All other systems reviewed and negative.     PMHx:   Past Medical History:   Diagnosis Date   • Arthralgia of multiple joints 3/8/2011   • Arthritis    • ASTHMA 2010   • Elevated antinuclear antibody (JOSE C) level 3/8/2011   • Personal history of hydronephrosis    • Rhinitis, allergic 2010   • Vitamin d deficiency 3/8/2011       PSHx:   Past Surgical History:   Procedure Laterality Date   • ABDOMINAL HYSTERECTOMY TOTAL      endometriosis and bleeding   • OOPHORECTOMY Bilateral    • SALPINGECTOMY Bilateral    • ABDOMINAL EXPLORATION      endometriosis   • CHOLECYSTECTOMY     • TONSILLECTOMY         Family Hx:   Family History   Problem Relation Age of Onset   • Arthritis Mother    • Heart Disease Mother    • Colon Cancer Father         70s   • Alcohol abuse Maternal Grandmother    • Suicide Attempts Maternal Grandmother          by suicide   • Bipolar disorder Maternal Grandmother    • Alcohol abuse Maternal Grandfather    • Brain Cancer Maternal Grandfather    • Alcohol abuse Paternal Grandmother    • Breast Cancer Paternal Grandmother          of breast CA   • Alcohol abuse Paternal Grandfather    • Heart Disease Paternal Grandfather    • Other Daughter         parotid tumor, vertigo   • Brain Cancer Son        Social Hx:  Social History      Socioeconomic History   • Marital status:      Spouse name: Not on file   • Number of children: 2   • Years of education: Not on file   • Highest education level: Not on file   Occupational History   • Not on file   Tobacco Use   • Smoking status: Never Smoker   • Smokeless tobacco: Never Used   Vaping Use   • Vaping Use: Never used   Substance and Sexual Activity   • Alcohol use: Yes     Comment: 1 glass of wine on occasion   • Drug use: Never   • Sexual activity: Yes     Partners: Male     Birth control/protection: Female Sterilization   Other Topics Concern   • Not on file   Social History Narrative    3 foster children currently.     Social Determinants of Health     Financial Resource Strain:    • Difficulty of Paying Living Expenses: Not on file   Food Insecurity:    • Worried About Running Out of Food in the Last Year: Not on file   • Ran Out of Food in the Last Year: Not on file   Transportation Needs:    • Lack of Transportation (Medical): Not on file   • Lack of Transportation (Non-Medical): Not on file   Physical Activity:    • Days of Exercise per Week: Not on file   • Minutes of Exercise per Session: Not on file   Stress:    • Feeling of Stress : Not on file   Social Connections:    • Frequency of Communication with Friends and Family: Not on file   • Frequency of Social Gatherings with Friends and Family: Not on file   • Attends Protestant Services: Not on file   • Active Member of Clubs or Organizations: Not on file   • Attends Club or Organization Meetings: Not on file   • Marital Status: Not on file   Intimate Partner Violence:    • Fear of Current or Ex-Partner: Not on file   • Emotionally Abused: Not on file   • Physically Abused: Not on file   • Sexually Abused: Not on file   Housing Stability:    • Unable to Pay for Housing in the Last Year: Not on file   • Number of Places Lived in the Last Year: Not on file   • Unstable Housing in the Last Year: Not on file        Allergies:  Allergies   Allergen Reactions   • Biaxin [Clarithromycin] Vomiting   • Latex        Medications: reviewed on epic.   Outpatient Medications Marked as Taking for the 2/11/22 encounter (Office Visit) with Na Veloz M.D.   Medication Sig Dispense Refill   • cyclobenzaprine (FLEXERIL) 10 mg Tab Take 1 Tablet by mouth every day. 90 Tablet 2   • buPROPion (WELLBUTRIN XL) 150 MG XL tablet Take 1 Tablet by mouth every morning. 30 Tablet 1   • zolpidem (AMBIEN) 10 MG Tab Take 1 Tablet by mouth at bedtime as needed for Sleep for up to 30 days. 30 Tablet 0   • ALPRAZolam (XANAX) 1 MG Tab Take 1 Tablet by mouth at bedtime as needed for Sleep or Anxiety for up to 15 doses. 15 Tablet 0   • pregabalin (LYRICA) 200 MG capsule Take 200mg by mouth in the morning and 400mg at night 270 Capsule 0   • calcipotriene (DOVONEX) 0.005 % Cream      • Acetaminophen (TYLENOL PO) Take  by mouth.     • furosemide (LASIX) 20 MG Tab Take 1 Tablet by mouth 1 time a day as needed (edema). 90 Tablet 0   • nystatin (MYCOSTATIN) powder Apply 1 g topically in the morning, at noon, and at bedtime. To affected areas until healed 30 g 1   • levalbuterol (XOPENEX HFA) 45 MCG/ACT inhaler Inhale 1-2 Puffs every four hours as needed for Shortness of Breath (asthma symptoms). 30 g 3   • atorvastatin (LIPITOR) 20 MG Tab Take 20 mg by mouth every day.     • REXULTI 3 MG Tab Take 3 mg by mouth every day.     • celecoxib (CELEBREX) 200 MG Cap Take 200 mg by mouth every day.     • DULoxetine (CYMBALTA) 60 MG Cap DR Particles delayed-release capsule Take 120 mg by mouth every day.     • metaxalone (SKELAXIN) 800 MG Tab Take 800 mg by mouth every 12 hours as needed.     • propranolol CR (INDERAL LA) 80 MG CAPSULE SR 24 HR Take 80 mg by mouth every day.     • omeprazole (PRILOSEC) 40 MG delayed-release capsule Take 40 mg by mouth every day.     • fluticasone-salmeterol (ADVAIR) 250-50 MCG/DOSE AEROSOL POWDER, BREATH ACTIVATED Inhale 1 Puff  every 12 hours. 60 Each 3   • ergocalciferol (DRISDOL) 72753 UNIT capsule Take 1 Cap by mouth. Take 2 a week for one month, then 1 weekly. Refills for 4 at a time 8 Cap 11   • ZYRTEC 10 MG PO TABS Take 10 mg by mouth every day.          Current Outpatient Medications on File Prior to Visit   Medication Sig Dispense Refill   • cyclobenzaprine (FLEXERIL) 10 mg Tab Take 1 Tablet by mouth every day. 90 Tablet 2   • buPROPion (WELLBUTRIN XL) 150 MG XL tablet Take 1 Tablet by mouth every morning. 30 Tablet 1   • zolpidem (AMBIEN) 10 MG Tab Take 1 Tablet by mouth at bedtime as needed for Sleep for up to 30 days. 30 Tablet 0   • ALPRAZolam (XANAX) 1 MG Tab Take 1 Tablet by mouth at bedtime as needed for Sleep or Anxiety for up to 15 doses. 15 Tablet 0   • pregabalin (LYRICA) 200 MG capsule Take 200mg by mouth in the morning and 400mg at night 270 Capsule 0   • calcipotriene (DOVONEX) 0.005 % Cream      • Acetaminophen (TYLENOL PO) Take  by mouth.     • furosemide (LASIX) 20 MG Tab Take 1 Tablet by mouth 1 time a day as needed (edema). 90 Tablet 0   • nystatin (MYCOSTATIN) powder Apply 1 g topically in the morning, at noon, and at bedtime. To affected areas until healed 30 g 1   • levalbuterol (XOPENEX HFA) 45 MCG/ACT inhaler Inhale 1-2 Puffs every four hours as needed for Shortness of Breath (asthma symptoms). 30 g 3   • atorvastatin (LIPITOR) 20 MG Tab Take 20 mg by mouth every day.     • REXULTI 3 MG Tab Take 3 mg by mouth every day.     • celecoxib (CELEBREX) 200 MG Cap Take 200 mg by mouth every day.     • DULoxetine (CYMBALTA) 60 MG Cap DR Particles delayed-release capsule Take 120 mg by mouth every day.     • metaxalone (SKELAXIN) 800 MG Tab Take 800 mg by mouth every 12 hours as needed.     • propranolol CR (INDERAL LA) 80 MG CAPSULE SR 24 HR Take 80 mg by mouth every day.     • omeprazole (PRILOSEC) 40 MG delayed-release capsule Take 40 mg by mouth every day.     • fluticasone-salmeterol (ADVAIR) 250-50 MCG/DOSE  "AEROSOL POWDER, BREATH ACTIVATED Inhale 1 Puff every 12 hours. 60 Each 3   • ergocalciferol (DRISDOL) 98964 UNIT capsule Take 1 Cap by mouth. Take 2 a week for one month, then 1 weekly. Refills for 4 at a time 8 Cap 11   • ZYRTEC 10 MG PO TABS Take 10 mg by mouth every day.     • prazosin (MINIPRESS) 2 MG Cap Take 1 Capsule by mouth every evening for 7 days, THEN 2 Capsules every evening for 23 days. (Patient not taking: Reported on 2/11/2022) 53 Capsule 0   • tacrolimus (PROTOPIC) 0.1 % Ointment Apply  topically 2 times a day. (Patient not taking: Reported on 2/11/2022)     • clotrimazole-betamethasone (LOTRISONE) 1-0.05 % Cream Apply 1 Application topically 2 times a day. (Patient not taking: Reported on 2/11/2022) 60 g 0     No current facility-administered medications on file prior to visit.         EXAMINATION     Physical Exam:   /64 (BP Location: Left arm, Patient Position: Sitting, BP Cuff Size: Adult)   Pulse 72   Temp 36.5 °C (97.7 °F) (Temporal)   Resp 17   Ht 1.727 m (5' 8\")   Wt 121 kg (266 lb 1.5 oz)   SpO2 93%     Constitutional:   Body Habitus: Body mass index is 40.46 kg/m².  Cooperation: Fully cooperates with exam  Appearance: Well-groomed, well-nourished.    Eyes: No scleral icterus to suggest severe liver disease, no proptosis to suggest severe hyperthyroidism    ENT -no obvious auditory deficits, no noticeable facial droop     Skin -no rashes or lesions noted     Respiratory-  breathing comfortably on room air, no audible wheezing    Cardiovascular-distal extremities warm and well perfused.  No lower extremity edema is noted.     Gastrointestinal - no obvious abdominal masses, non-distended    Psychiatric- alert and oriented ×3. Normal affect.     Gait - no use of ambulatory device, nonantalgic. Gait notable for decreased hip flexion, nearly shuffling her feet.  previous exam: unable to heel walk with difficulty balancing.  Able to rise onto toes, however having difficulty balancing " with toe walk.     Musculoskeletal and Neuro -      Cervical spine / upper extremities  Inspection: No deformities of the skin over the cervical spine. No rashes or lesions.     limited active range of motion in all directions.  Forward hunched posture with palpably increased muscular tension throughout posterior upper back.     Spurling's sign  negative bilaterally  Cervical facet loading maneuver  negative bilaterally     No signs of muscular atrophy in bilateral upper extremities      Tenderness to palpation at right paracervical muscle and bilateral upper trapezius.        Motor Exam Upper Extremities   ? Myotome R L   Shoulder abduction C5 5 5   Elbow flexion C5 5 5   Wrist extension C6 4+* 4+*   Elbow extension C7 5 5   Finger flexion C8 4- 4-   Finger abduction T1 4-^ 4-^   * giveway weakness  ^ Decreased ROM     Previous exam    Impaired sensation to light touch at anterior right upper trapezius, right dorsum of thumb, right lateral shoulder, right anterior wrist     Tinel's at the wrist over the median nerve negative bilaterally  Tinel's at the cubital tunnel: negative bilaterally    Reflexes  ?   R L   Biceps   2+ 2+   Brachioradialis   2+ 2+      Smith's sign positive right, negative left         Thoracic/Lumbar Spine/Sacral Spine/Hips   Inspection: No evidence of atrophy in bilateral lower extremities throughout      ROM: limited active range of motion with lumbar flexion, lateral flexion, and rotation bilaterally.   There is limited active range of motion with lumbar extension     Facet loading maneuver pos on right, neg on left     Impaired sensation to LT at bilateral upper thighs and right distal lateral calf.     Tenderness to palpation over the right low back, no tenderness to palpation at left low back or bilateral sacroiliac joints     Lumbar spine /hip provocative exam maneuvers  Straight leg raise negative bilaterally  FADIR test negative bilaterally     SI joint tests  VIOLETTA test negative  bilaterally  Thigh thrust test negative bilaterally     Motor Exam Lower Extremities  ? Myotome R L   Hip flexion L2 5 5   Knee extension L3 5 5   Ankle dorsiflexion L4 5 5   Toe extension L5 5 5   Ankle plantarflexion S1 5 5         Previous exam  Reflexes  ?   R L   Patella   2+ 2+   Achilles    2+ 2+      Clonus of the ankle positive bilaterally             MEDICAL DECISION MAKING    Medical records review: see under HPI section.     DATA    Labs: No new labs available for review since last visit.    Lab Results   Component Value Date/Time    SODIUM 138 01/03/2022 08:28 AM    POTASSIUM 4.6 01/03/2022 08:28 AM    CHLORIDE 103 01/03/2022 08:28 AM    CO2 21 01/03/2022 08:28 AM    ANION 14.0 01/03/2022 08:28 AM    GLUCOSE 107 (H) 01/03/2022 08:28 AM    BUN 19 01/03/2022 08:28 AM    CREATININE 0.91 01/03/2022 08:28 AM    CREATININE 0.91 02/23/2011 09:27 AM    CALCIUM 9.4 01/03/2022 08:28 AM    ASTSGOT 34 12/10/2021 04:31 PM    ALTSGPT 37 12/10/2021 04:31 PM    TBILIRUBIN 0.3 12/10/2021 04:31 PM    ALBUMIN 4.2 12/10/2021 04:31 PM    TOTPROTEIN 6.9 12/10/2021 04:31 PM    GLOBULIN 2.7 12/10/2021 04:31 PM    AGRATIO 1.6 12/10/2021 04:31 PM       No results found for: PROTHROMBTM, INR     Lab Results   Component Value Date/Time    WBC 7.5 12/10/2021 04:31 PM    RBC 5.31 12/10/2021 04:31 PM    HEMOGLOBIN 15.3 12/10/2021 04:31 PM    HEMATOCRIT 45.8 12/10/2021 04:31 PM    MCV 86.3 12/10/2021 04:31 PM    MCH 28.8 12/10/2021 04:31 PM    MCHC 33.4 (L) 12/10/2021 04:31 PM    MPV 10.6 12/10/2021 04:31 PM    NEUTSPOLYS 52.40 12/10/2021 04:31 PM    LYMPHOCYTES 33.80 12/10/2021 04:31 PM    MONOCYTES 6.40 12/10/2021 04:31 PM    EOSINOPHILS 6.10 12/10/2021 04:31 PM    BASOPHILS 0.80 12/10/2021 04:31 PM        No results found for: HBA1C     EMG/NCS 3/16/11 by Dr. Martinez       Imaging:   I personally reviewed following images, these are my reads  X-ray lumbar spine 12/10/2010  Normal alignment.  No evidence of significant facet  arthropathy or disc base narrowing.  Mild sclerosis of bilateral sacroiliac joints     MRI cervical spine 3/7/11  Disc bulge at C3-4, C4-5, C5-6, C6-7 with Modic changes at C6-7.  Mild-moderate neuroforaminal stenosis at right C5-C6.  Very mild spinal canal stenosis at C4-5 and C6-7. Decreased cervical lordosis    MRI cervical spine 1/13/22  Severe right and mild left neuroforaminal stenosis at C4-C5, moderate to severe bilateral neuroforaminal stenosis at C5-C6. Facet arthropathy at C3-C4, C4-C5, and C5-C6. Mild central canal stenosis at C5-C6.      MRI lumbar spine 1/13/22  No significant neuroforaminal stenosis at any level. Facet arthropathy at bilateral L4-L5 and L5-S1. Central and right paracentral disc extrusion at L1-L2 that is not impinging on the adjacent nerve root or central canal.    IMAGING radiology reads. I reviewed the following radiology reads       Results for orders placed during the hospital encounter of 01/26/22    MR-BRAIN-W/O    Impression  1.  10 mm x 7 mm x 7 mm notch-like defect in the posterior-inferior midline cerebellar vermis. This was not present on MRI cervical spine from 3/7/2011. The lesion is isointense with CSF and appears contiguous with cisterna magna. Differential  considerations would include development of an arachnoid cyst invaginating the cerebellar vermis versus macrocystic encephalomalacia extending to the vermian surface consistent with an old infarct or other remote cerebellar vermis insult. Uncertain  whether this may be related to the patient's unsteady gait.             Results for orders placed in visit on 01/13/22    MR-CERVICAL SPINE-W/O    Impression  Mild degenerative disease in the cervical spine as described above. When compared with the previous MRI dated 3/7/2011, there has been mild interval progression of the degenerative disease.      Results for orders placed in visit on 01/13/22    MR-LUMBAR SPINE-W/O    Impression  There is central and right  paracentral disc extrusion at L1-2. The extruded disc fragment migrated upwards. There is mild effacement of the ventral subarachnoid space. There is no significant canal compromise.     MR-CERVICAL SPINE-W/O 03/07/11    FINDINGS:   The cervical spine maintains normal height and alignment.     At the level of C2-3, there is no spinal or neural foraminal stenosis.     At the level of C3-4, there is mild disk bulge causing mild effacement of   the anterior thecal sac.     At the level of C4-5, there is mild disk bulge causing mild spinal canal   stenosis.  There is flattening of the anterior spinal cord contour.     At the level of C5-6, there is mild disk bulge causing effacement of the   anterior thecal sac.  There is mild flattening of the anterior spinal   cord contour.     At the level of C6-7, there is disk degeneration.  There is diffuse disk   bulge causing mild spinal canal stenosis.  There is flattening of the   anterior spinal cord contour.     At the level of C7-T1, there is no spinal or neural foraminal stenosis.     The visualized posterior fossa structures appear normal within limits.     The cervical spinal cord does not demonstrate any mass or abnormal T2   signal intensity.     The visualized pre-and paraspinal soft tissues appear normal within   limits.         IMPRESSION:   Mild degenerative changes in the cervical spine as described above.       XR lumbar spine 12/10/2010  FINDINGS:   The bony trabecular pattern is normal.  The lumbar vertebral bodies have   a normal height and alignment.  The disc spaces are well maintained and   symmetrical.  There is no evidence of spondylolisthesis, spondylolysis,   or osseous lesion.  The posterior elements are unremarkable.   Mildly sclerotic SI joints.         IMPRESSION:   Normal complete lumbar spine series.  Mildly sclerotic SI joints.      Diagnosis  Visit Diagnoses     ICD-10-CM   1. Lumbar spondylosis  M47.816   2. Abnormal brain MRI  R90.89   3.  Rheumatoid arthritis without rheumatoid factor, multiple sites (HCC)  M06.09   4. Chronic pain syndrome  G89.4         ASSESSMENT AND PLAN:  Kasie Rodriguez is a 46 y.o. female with history of rheumatoid arthritis followed by rheumatology Dr. Olivo, vitamin D deficiency, insomnia, anxiety, depression, lower extremity edema, IBS, BMI 40 who presents with neck pain and low back pain that does not radiate. She has a positive right facet loading maneuver and imaging findings of facet arthropathy at L4-L5 and L5-S1, suggestive of lumbar facetogenic pain. Negative SLR and Spurlings today.    Also with ongoing gait instability with MRI brain revealing cerebellar lesion which could be clinically relevant.        Kasie was seen today for follow-up.    Diagnoses and all orders for this visit:    Lumbar spondylosis  -     Referral to Pain Clinic    Abnormal brain MRI    Rheumatoid arthritis without rheumatoid factor, multiple sites (HCC)    Chronic pain syndrome          The above note documents my personal evaluation of this patient. In addition, I have reviewed and confirmed with the patient and MA the supportive information documented in today's Patient Health Questionnaire and Office Note.       PLAN  Physical Therapy: Defer physical therapy for now given patient's respiratory difficulty which is currently being worked up.  Discussed that I think physical therapy would be helpful in the future after her respiratory status improves.     Diagnostic workup: MRI lumbar and cervical spine personally reviewed today.      Medications:  - cont lyrica, Flexeril, Cymbalta, Skelaxin, celebrex  - discontinue low dose naltrexone 4.5mg daily given no relief  - consider taking tylenol as needed up to 4 grams per day, in divided doses at least 6 hours apart. Do not take more than 1 gram at a time.     Interventions:   - diagnostic lumbar medial branch blocks targeting Bilateral L4-L5 and L5-S1 facet joints. The risks, benefits,  and alternatives to this procedure were discussed and the patient wishes to proceed with the procedure. Risks include but are not limited to damage to surrounding structures, infection, bleeding, worsening of pain which can be permanent, and weakness which can be permanent. Benefits include pain relief and improved function. Alternatives include not doing the procedure.    - consider TPI PRN    Referrals: Patient previously declined referral for repeat EMG/NCS.  She reports she has had this twice in the past and was difficult to tolerate.     Other:  - Continue follow-up with rheumatology Dr. Olivo     Outside records requested:  The patient previously signed outside records request form for her outside records including outside images. This includes the records from [place]    Follow-up: 1 week after procedure above    Orders Placed This Encounter   • Referral to Pain Clinic       Na Veloz MD  Interventional Pain Management  Physical Medicine and Rehabilitation  [facility]    Please note that this dictation was created using voice recognition software. I have made every reasonable attempt to correct obvious errors, but I expect that there are errors of grammar and possibly content that I did not discover before finalizing the note.

## 2022-02-15 ENCOUNTER — OFFICE VISIT (OUTPATIENT)
Dept: MEDICAL GROUP | Facility: PHYSICIAN GROUP | Age: 47
End: 2022-02-15
Payer: COMMERCIAL

## 2022-02-15 VITALS
HEIGHT: 68 IN | HEART RATE: 85 BPM | SYSTOLIC BLOOD PRESSURE: 124 MMHG | OXYGEN SATURATION: 97 % | WEIGHT: 257 LBS | TEMPERATURE: 97.4 F | BODY MASS INDEX: 38.95 KG/M2 | DIASTOLIC BLOOD PRESSURE: 86 MMHG

## 2022-02-15 DIAGNOSIS — R11.0 NAUSEA: ICD-10-CM

## 2022-02-15 DIAGNOSIS — L30.4 INTERTRIGO: ICD-10-CM

## 2022-02-15 DIAGNOSIS — R10.10 UPPER ABDOMINAL PAIN: ICD-10-CM

## 2022-02-15 DIAGNOSIS — R19.7 DIARRHEA, UNSPECIFIED TYPE: ICD-10-CM

## 2022-02-15 PROCEDURE — 99215 OFFICE O/P EST HI 40 MIN: CPT | Performed by: STUDENT IN AN ORGANIZED HEALTH CARE EDUCATION/TRAINING PROGRAM

## 2022-02-15 RX ORDER — KETOCONAZOLE 20 MG/G
1 CREAM TOPICAL DAILY
Qty: 30 G | Refills: 1 | Status: SHIPPED | OUTPATIENT
Start: 2022-02-15 | End: 2022-08-16

## 2022-02-15 ASSESSMENT — FIBROSIS 4 INDEX: FIB4 SCORE: 1.41

## 2022-02-15 NOTE — Clinical Note
Good afternoon, I saw Mrs. Rodriguez today who continues to complain of diarrhea, nausea and upper abdominal pain since just after her visit for PTSD.  We are ruling out infectious causes and she mentioned the only new medication which she is already stopped was prazosin, but I also see that Wellbutrin was added.  We did discuss possibility of serotonin syndrome which she felt was unlikely as her medications had not change but I did notice that Wellbutrin was added so I wanted to make sure you were aware of the possible side effect. Thank you!

## 2022-02-15 NOTE — PROGRESS NOTES
Subjective:     Chief Complaint   Patient presents with   • Follow-Up   • Diarrhea     3x days      HPI:   Kasie presents today for follow-up and to discuss diarrhea.    Patient states she had no medication changes aside from prazosin which she started shortly after her behavioral health appointment 1/28/2022.  She states that after start she did note some diarrhea and stop the medication.  The diarrhea improved but for the past 3 days has resumed going as often as every 1-2 hours, watery without blood.  She is reporting upper abdominal discomfort without heartburn, continues on PPI.  Reports some nausea without vomiting.  Has lost some weight due to the diarrhea, trying to hydrate as best she can.  She denies any sick contacts or other viral symptoms.  Denies any recent changes in food or undercooked foods. No fever/chills.  Patient states she is she is never had diarrhea like this before.    Dizziness is not as bad as last visit and gait/balance has improved as long as she keeps her eyes open.  She has not yet made an appointment with neurosurgery.    She does report improvement in her breathing since last visit and she has an appointment with pulmonary coming up as well as PFTs.    She also complains of lack of improvement of the rash on her right hand, was told by dermatology to follow-up if not improved as they had thoughts about how to treat.  Previously was taking clotrimazole-betamethasone which she said was like water and doing nothing.    She also reports that the red rash underneath her breasts has not improved much with nystatin.  States that she takes a shower about twice a day to feel clean.  States the rash is pruritic.    Current Outpatient Medications Ordered in Epic   Medication Sig Dispense Refill   • ketoconazole (NIZORAL) 2 % Cream Apply 1 Application topically every day. 30 g 1   • cyclobenzaprine (FLEXERIL) 10 mg Tab Take 1 Tablet by mouth every day. 90 Tablet 2   • buPROPion (WELLBUTRIN XL)  150 MG XL tablet Take 1 Tablet by mouth every morning. 30 Tablet 1   • zolpidem (AMBIEN) 10 MG Tab Take 1 Tablet by mouth at bedtime as needed for Sleep for up to 30 days. 30 Tablet 0   • ALPRAZolam (XANAX) 1 MG Tab Take 1 Tablet by mouth at bedtime as needed for Sleep or Anxiety for up to 15 doses. 15 Tablet 0   • pregabalin (LYRICA) 200 MG capsule Take 200mg by mouth in the morning and 400mg at night 270 Capsule 0   • calcipotriene (DOVONEX) 0.005 % Cream      • furosemide (LASIX) 20 MG Tab Take 1 Tablet by mouth 1 time a day as needed (edema). 90 Tablet 0   • levalbuterol (XOPENEX HFA) 45 MCG/ACT inhaler Inhale 1-2 Puffs every four hours as needed for Shortness of Breath (asthma symptoms). 30 g 3   • atorvastatin (LIPITOR) 20 MG Tab Take 20 mg by mouth every day.     • REXULTI 3 MG Tab Take 3 mg by mouth every day.     • celecoxib (CELEBREX) 200 MG Cap Take 200 mg by mouth every day.     • DULoxetine (CYMBALTA) 60 MG Cap DR Particles delayed-release capsule Take 120 mg by mouth every day.     • metaxalone (SKELAXIN) 800 MG Tab Take 800 mg by mouth every 12 hours as needed.     • propranolol CR (INDERAL LA) 80 MG CAPSULE SR 24 HR Take 80 mg by mouth every day.     • omeprazole (PRILOSEC) 40 MG delayed-release capsule Take 40 mg by mouth every day.     • fluticasone-salmeterol (ADVAIR) 250-50 MCG/DOSE AEROSOL POWDER, BREATH ACTIVATED Inhale 1 Puff every 12 hours. 60 Each 3   • ergocalciferol (DRISDOL) 42561 UNIT capsule Take 1 Cap by mouth. Take 2 a week for one month, then 1 weekly. Refills for 4 at a time 8 Cap 11   • ZYRTEC 10 MG PO TABS Take 10 mg by mouth every day.     • Acetaminophen (TYLENOL PO) Take  by mouth.       No current Epic-ordered facility-administered medications on file.     Health Maintenance: Declines flu vaccine given recent concerns    ROS:  Gen: no fevers/chills, recent weight loss  Eyes: no changes in vision  ENT: no sore throat  Pulm: no sob, no cough  CV: no chest pain, no  "palpitations  GI: no vomiting  : no dysuria  Skin: no rash  Neuro: no severe or persistent headaches    Objective:     Exam:  /86 (BP Location: Left arm, Patient Position: Sitting, BP Cuff Size: Large adult)   Pulse 85   Temp 36.3 °C (97.4 °F) (Temporal)   Ht 1.727 m (5' 8\")   Wt 117 kg (257 lb)   LMP 07/28/2006   SpO2 97%   BMI 39.08 kg/m²  Body mass index is 39.08 kg/m².    Physical Exam:  Constitutional: Well-developed and well-nourished. No acute distress.   Skin: Skin is warm and dry.  Erythematous flat plaque under breasts bilaterally with white clumps (nystain) noted in the crease  Head: Atraumatic without lesions.  Eyes: Conjunctivae and extraocular motions are normal. Pupils are equal, round. No scleral icterus.   Mouth/Throat: Wearing mask.  Neck: Supple, trachea midline.  Cardiovascular: Regular rate and rhythm, S1 and S2 without murmur, rubs, or gallops.  Lungs: Normal inspiratory effort, CTA bilaterally, no wheezes/rhonchi/rales  Abdomen: Obese. Soft, generalized upper abdominal tenderness without rebound or guarding, without distention. Active bowel sounds. No masses or HSM.  Extremities: No cyanosis, clubbing, erythema, nor edema.  Neurological: Alert and oriented x 3. No gross deficits.  Psychiatric:  Affect is flat and psychomotor retardation noted, unchanged from prior.    Labs: Reviewed from 12/10/2021, 1/3/2022    Assessment & Plan:     46 y.o. female with the following -     1. Diarrhea, unspecified type  - Cdiff By PCR Rflx Toxin; Future  - CULTURE STOOL; Future  2. Nausea  3. Upper abdominal pain  This is an acute condition which seems to have started when patient started her prazosin but has since stopped.  We discussed that given her medications she certainly at risk for serotonin syndrome, reviewed that with patient.  She does not suspect that as she has been currently on the same medications and tolerating well for quite some time.  She stated that the only new medication " was prazosin which she is already stopped, but upon review of behavioral health note it seems that they started Wellbutrin to augment for depression.  We will plan to rule out infectious causes, consider alternative causes if persistent but yet no etiology.  Stressed importance of hydration and provided with return precautions.  Messaged psychiatry team regarding her new symptoms as medication side effect is possible given addition of Wellbutrin.    4. Intertrigo  Not improved with nystatin, will trial ketoconazole as below.  - ketoconazole (NIZORAL) 2 % Cream; Apply 1 Application topically every day.  Dispense: 30 g; Refill: 1    I spent a total of 42 minutes with record review, exam, communication with the patient/psychiatry team, and documentation of this encounter.    Return in about 4 weeks (around 3/15/2022), or if symptoms worsen or fail to improve.    Please note that this dictation was created using voice recognition software. I have made every reasonable attempt to correct obvious errors, but I expect that there are errors of grammar and possibly content that I did not discover before finalizing the note.

## 2022-02-16 NOTE — PATIENT INSTRUCTIONS
Serotonin Syndrome  Serotonin is a chemical in your body (neurotransmitter) that helps to control several functions, such as:  · Brain and nerve cell function.  · Mood and emotions.  · Memory.  · Eating.  · Sleeping.  · Sexual activity.  · Stress response.  Having too much serotonin in your body can cause serotonin syndrome. This condition can be harmful to your brain and nerve cells. This can be a life-threatening condition.  What are the causes?  This condition may be caused by taking medicines or drugs that increase the level of serotonin in your body, such as:  · Antidepressant medicines.  · Migraine medicines.  · Certain pain medicines.  · Certain drugs, including ecstasy, LSD, cocaine, and amphetamines.  · Over-the-counter cough or cold medicines that contain dextromethorphan.  · Certain herbal supplements, including Rivera's wort, ginseng, and nutmeg.  This condition usually occurs when you take these medicines or drugs in combination, but it can also happen with a high dose of a single medicine or drug.  What increases the risk?  You are more likely to develop this condition if:  · You just started taking a medicine or drug that increases the level of serotonin in the body.  · You recently increased the dose of a medicine or drug that increases the level of serotonin in the body.  · You take more than one medicine or drug that increases the level of serotonin in the body.  What are the signs or symptoms?  Symptoms of this condition usually start within several hours of taking a medicine or drug. Symptoms may be mild or severe. Mild symptoms include:  · Sweating.  · Restlessness or agitation.  · Muscle twitching or stiffness.  · Rapid heart rate.  · Nausea and vomiting.  · Diarrhea.  · Headache.  · Shivering or goose bumps.  · Confusion.  Severe symptoms include:  · Irregular heartbeat.  · Seizures.  · Loss of consciousness.  · High fever.  How is this diagnosed?  This condition may be diagnosed based  on:  · Your medical history.   · A physical exam.  · Your prior use of drugs and medicines.  · Blood or urine tests. These may be used to rule out other causes of your symptoms.  How is this treated?  The treatment for this condition depends on the severity of your symptoms.  · For mild cases, stopping the medicine or drug that caused your condition is usually all that is needed.  · For moderate to severe cases, treatment in a hospital may be needed to prevent or manage life-threatening symptoms. This may include medicines to control your symptoms, IV fluids, interventions to support your breathing, and treatments to control your body temperature.  Follow these instructions at home:  Medicines    · Take over-the-counter and prescription medicines only as told by your health care provider. This is important.  · Check with your health care provider before you start taking any new prescriptions, over-the-counter medicines, herbs, or supplements.  · Avoid combining any medicines that can cause this condition to occur.  Lifestyle    · Maintain a healthy lifestyle.  ? Eat a healthy diet that includes plenty of vegetables, fruits, whole grains, low-fat dairy products, and lean protein. Do not eat a lot of foods that are high in fat, added sugars, or salt.  ? Get the right amount and quality of sleep. Most adults need 7-9 hours of sleep each night.  ? Make time to exercise, even if it is only for short periods of time. Most adults should exercise for at least 150 minutes each week.  ? Do not drink alcohol.  ? Do not use illegal drugs, and do not take medicines for reasons other than they are prescribed.  General instructions  · Do not use any products that contain nicotine or tobacco, such as cigarettes and e-cigarettes. If you need help quitting, ask your health care provider.  · Keep all follow-up visits as told by your health care provider. This is important.  Contact a health care provider if:  · Your symptoms do not  improve or they get worse.  Get help right away if you:  · Have worsening confusion, severe headache, chest pain, high fever, seizures, or loss of consciousness.  · Experience serious side effects of medicine, such as swelling of your face, lips, tongue, or throat.  · Have serious thoughts about hurting yourself or others.  These symptoms may represent a serious problem that is an emergency. Do not wait to see if the symptoms will go away. Get medical help right away. Call your local emergency services (071 in the [place] Do not drive yourself to the hospital.  If you ever feel like you may hurt yourself or others, or have thoughts about taking your own life, get help right away. You can go to your nearest emergency department or call:  · Your local emergency services (398 in the [place]  · ·A suicide crisis helpline, such as the National Suicide Prevention Lifeline at 1-549.930.5966. This is open 24 hours a day.  Summary  · Serotonin is a brain chemical that helps to regulate the nervous system. High levels of serotonin in the body can cause serotonin syndrome, which is a very dangerous condition.  · This condition may be caused by taking medicines or drugs that increase the level of serotonin in your body.  · Treatment depends on the severity of your symptoms. For mild cases, stopping the medicine or drug that caused your condition is usually all that is needed.  · Check with your health care provider before you start taking any new prescriptions, over-the-counter medicines, herbs, or supplements.  This information is not intended to replace advice given to you by your health care provider. Make sure you discuss any questions you have with your health care provider.  Document Released: 01/25/2006 Document Revised: 01/25/2019 Document Reviewed: 01/25/2019  Elsevier Patient Education © 2020 [employer]      Diarrhea, Adult  Diarrhea is when you pass loose and watery poop (stool) often. Diarrhea can make you feel  weak and cause you to lose water in your body (get dehydrated). Losing water in your body can cause you to:  · Feel tired and thirsty.  · Have a dry mouth.  · Go pee (urinate) less often.  Diarrhea often lasts 2-3 days. However, it can last longer if it is a sign of something more serious. It is important to treat your diarrhea as told by your doctor.  Follow these instructions at home:  Eating and drinking         Follow these instructions as told by your doctor:  · Take an ORS (oral rehydration solution). This is a drink that helps you replace fluids and minerals your body lost. It is sold at pharmacies and stores.  · Drink plenty of fluids, such as:  ? Water.  ? Ice chips.  ? Diluted fruit juice.  ? Low-calorie sports drinks.  ? Milk, if you want.  · Avoid drinking fluids that have a lot of sugar or caffeine in them.  · Eat bland, easy-to-digest foods in small amounts as you are able. These foods include:  ? Bananas.  ? Applesauce.  ? Rice.  ? Low-fat (lean) meats.  ? Toast.  ? Crackers.  · Avoid alcohol.  · Avoid spicy or fatty foods.    Medicines  · Take over-the-counter and prescription medicines only as told by your doctor.  · If you were prescribed an antibiotic medicine, take it as told by your doctor. Do not stop using the antibiotic even if you start to feel better.  General instructions    · Wash your hands often using soap and water. If soap and water are not available, use a hand . Others in your home should wash their hands as well. Hands should be washed:  ? After using the toilet or changing a diaper.  ? Before preparing, cooking, or serving food.  ? While caring for a sick person.  ? While visiting someone in a hospital.  · Drink enough fluid to keep your pee (urine) pale yellow.  · Rest at home while you get better.  · Watch your condition for any changes.  · Take a warm bath to help with any burning or pain from having diarrhea.  · Keep all follow-up visits as told by your doctor. This  is important.  Contact a doctor if:  · You have a fever.  · Your diarrhea gets worse.  · You have new symptoms.  · You cannot keep fluids down.  · You feel light-headed or dizzy.  · You have a headache.  · You have muscle cramps.  Get help right away if:  · You have chest pain.  · You feel very weak or you pass out (faint).  · You have bloody or black poop or poop that looks like tar.  · You have very bad pain, cramping, or bloating in your belly (abdomen).  · You have trouble breathing or you are breathing very quickly.  · Your heart is beating very quickly.  · Your skin feels cold and clammy.  · You feel confused.  · You have signs of losing too much water in your body, such as:  ? Dark pee, very little pee, or no pee.  ? Cracked lips.  ? Dry mouth.  ? Sunken eyes.  ? Sleepiness.  ? Weakness.  Summary  · Diarrhea is when you pass loose and watery poop (stool) often.  · Diarrhea can make you feel weak and cause you to lose water in your body (get dehydrated).  · Take an ORS (oral rehydration solution). This is a drink that is sold at pharmacies and stores.  · Eat bland, easy-to-digest foods in small amounts as you are able.  · Contact a doctor if your condition gets worse. Get help right away if you have signs that you have lost too much water in your body.  This information is not intended to replace advice given to you by your health care provider. Make sure you discuss any questions you have with your health care provider.  Document Released: 06/05/2009 Document Revised: 05/24/2019 Document Reviewed: 05/24/2019  Elsevier Patient Education © 2020 [employer]

## 2022-02-17 ENCOUNTER — OFFICE VISIT (OUTPATIENT)
Dept: SLEEP MEDICINE | Facility: MEDICAL CENTER | Age: 47
End: 2022-02-17
Payer: COMMERCIAL

## 2022-02-17 VITALS
RESPIRATION RATE: 16 BRPM | DIASTOLIC BLOOD PRESSURE: 70 MMHG | BODY MASS INDEX: 40.97 KG/M2 | HEART RATE: 77 BPM | WEIGHT: 261 LBS | OXYGEN SATURATION: 94 % | SYSTOLIC BLOOD PRESSURE: 102 MMHG | HEIGHT: 67 IN

## 2022-02-17 DIAGNOSIS — Z86.73 HISTORY OF CVA (CEREBROVASCULAR ACCIDENT): ICD-10-CM

## 2022-02-17 DIAGNOSIS — G25.81 RLS (RESTLESS LEGS SYNDROME): ICD-10-CM

## 2022-02-17 DIAGNOSIS — G47.33 OSA (OBSTRUCTIVE SLEEP APNEA): Primary | ICD-10-CM

## 2022-02-17 DIAGNOSIS — F51.04 CHRONIC INSOMNIA: ICD-10-CM

## 2022-02-17 PROCEDURE — 99204 OFFICE O/P NEW MOD 45 MIN: CPT | Performed by: STUDENT IN AN ORGANIZED HEALTH CARE EDUCATION/TRAINING PROGRAM

## 2022-02-17 ASSESSMENT — FIBROSIS 4 INDEX: FIB4 SCORE: 1.41

## 2022-02-17 NOTE — PROGRESS NOTES
[facility] Consult Note     Date: 2/17/2022 / Time: 12:36 PM      Thank you for requesting a sleep medicine consultation on Kasie Rodriguez at the sleep center. Presents today with the chief complaints of snoring, difficulty breathing during the day, trouble falling asleep, trouble staying asleep, unrefreshing sleep. She is referred by Leslie Jean-Baptiste D.O.  [address]  [place] [address] for evaluation and treatment of sleep disorder breathing.     HISTORY OF PRESENT ILLNESS:     Kasie Rodriguez is a 46 y.o. female with anxiety, depression, PTSD, moderate persistent asthma, morbid obesity, chronic pain, restless leg syndrome, hyperlipidemia, GERD, history of CVA, and chronic insomnia.  Presents to Sleep Clinic for evaluation of trouble falling asleep, staying asleep and unrefreshing sleep.    She underwent a in lab PSG approximately 3 to 4 years ago at [facility].  Following sleep study she was told she was borderline for mild sleep apnea.  She underwent sinus surgery.  Since that time she has gained weight.  In addition she was diagnosed with the past CVA seen on MRI.    She continues to snore loudly.  She has a hard time breathing during the day and at night.  She has been told she has pauses in her breathing and she does wake up with a dry mouth.  She wakes up with headaches.  In addition she has night sweats.  Overall she finds her sleep unrefreshing.    During the day she has a hard time concentrating and has trouble memory at times.  There have been bouts of excessive sleepiness where she would need to pull over if driving or take a short nap before leaving work.  These happen randomly.    She is currently taking 10 mg Ambien at bedtime.  With Ambien she can fall asleep within 30 minutes.  Without Ambien she feels like he can take hours.  She has not participated in cognitive behavioral therapy for insomnia.  She has tried weaning off of Ambien but has had  "difficulty and has been taking it for the last 10 years.  She attributes some of her awakenings due to pain.  She does have the urge to move her legs at times and is currently on Lyrica which she found help with her restless legs.    As per supplemental questionnaire to be scanned or imported into chart:    [place] Sleepiness Score: 9    Sleep Schedule  Bedtime: Weekday 930-10pm Weekend 930pm-10pm   Wake time: Weekday 6am Weekend 6-630am   Sleep-onset latency: 30 min on Ambien 10 mg.   Awakenings from sleep: 3-4  Difficulty falling back asleep: yes   Bedroom partner: yes   Naps: Yes sometimes will take a daytime nap, 30-45 min restorative     DAYTIME SYMPTOMS:   Excessive daytime sleepiness: Yes  Daytime fatigue: Yes  Difficulty concentrating: Yes  Memory problems: Yes  Irritability:Yes  Work/school performance issues: Yes  Sleepiness with driving: Yes once a year   Caffeine/stimulant use: Yes coffee in morning   Alcohol use:Yes, How Many? Once a week      SLEEP RELATED SYMPTOMS  Snoring: Yes  Witnessed apnea or gasping/choking: Yes  Dry mouth or mouth breathing: Yes  Sweating: Yes  Teeth grinding/biting: Yes  Morning headaches: Yes  Refreshed Upon Awakening: No      SLEEP RELATED BEHAVIORS:  Parasomnias (walking, talking, eating, violence): Yes talking   Leg kicking: No   Restless legs - \"urge to move\": Yes  Nightmares: No  Recurrent: No   Dream enactment: No      NARCOLEPSY:  Cataplexy: No   Sleep paralysis: No   Sleep attacks: No   Hypnagogic/hypnopompic hallucinations: No     MEDICAL HISTORY  Past Medical History:   Diagnosis Date   • Arthralgia of multiple joints 3/8/2011   • Arthritis    • ASTHMA 1/20/2010   • Elevated antinuclear antibody (JOSE C) level 3/8/2011   • Personal history of hydronephrosis    • Rhinitis, allergic 1/20/2010   • Vitamin d deficiency 3/8/2011        SURGICAL HISTORY  Past Surgical History:   Procedure Laterality Date   • ABDOMINAL HYSTERECTOMY TOTAL  2004    endometriosis and bleeding "   • OOPHORECTOMY Bilateral    • SALPINGECTOMY Bilateral    • ABDOMINAL EXPLORATION      endometriosis   • CHOLECYSTECTOMY     • TONSILLECTOMY          FAMILY HISTORY  Family History   Problem Relation Age of Onset   • Arthritis Mother    • Heart Disease Mother    • Colon Cancer Father         70s   • Alcohol abuse Maternal Grandmother    • Suicide Attempts Maternal Grandmother          by suicide   • Bipolar disorder Maternal Grandmother    • Alcohol abuse Maternal Grandfather    • Brain Cancer Maternal Grandfather    • Alcohol abuse Paternal Grandmother    • Breast Cancer Paternal Grandmother          of breast CA   • Alcohol abuse Paternal Grandfather    • Heart Disease Paternal Grandfather    • Other Daughter         parotid tumor, vertigo   • Brain Cancer Son        SOCIAL HISTORY  Social History     Socioeconomic History   • Marital status:    • Number of children: 2   Tobacco Use   • Smoking status: Never Smoker   • Smokeless tobacco: Never Used   Vaping Use   • Vaping Use: Never used   Substance and Sexual Activity   • Alcohol use: Yes     Comment: 1 glass of wine on occasion   • Drug use: Never   • Sexual activity: Yes     Partners: Male     Birth control/protection: Female Sterilization   Social History Narrative    3 foster children currently.        Occupation: Research manager at      CURRENT MEDICATIONS  Current Outpatient Medications   Medication Sig Dispense Refill   • ketoconazole (NIZORAL) 2 % Cream Apply 1 Application topically every day. 30 g 1   • cyclobenzaprine (FLEXERIL) 10 mg Tab Take 1 Tablet by mouth every day. 90 Tablet 2   • buPROPion (WELLBUTRIN XL) 150 MG XL tablet Take 1 Tablet by mouth every morning. 30 Tablet 1   • zolpidem (AMBIEN) 10 MG Tab Take 1 Tablet by mouth at bedtime as needed for Sleep for up to 30 days. 30 Tablet 0   • ALPRAZolam (XANAX) 1 MG Tab Take 1 Tablet by mouth at bedtime as needed for Sleep or Anxiety for up to 15 doses. 15 Tablet 0   •  "pregabalin (LYRICA) 200 MG capsule Take 200mg by mouth in the morning and 400mg at night 270 Capsule 0   • calcipotriene (DOVONEX) 0.005 % Cream      • Acetaminophen (TYLENOL PO) Take  by mouth.     • furosemide (LASIX) 20 MG Tab Take 1 Tablet by mouth 1 time a day as needed (edema). 90 Tablet 0   • levalbuterol (XOPENEX HFA) 45 MCG/ACT inhaler Inhale 1-2 Puffs every four hours as needed for Shortness of Breath (asthma symptoms). 30 g 3   • atorvastatin (LIPITOR) 20 MG Tab Take 20 mg by mouth every day.     • REXULTI 3 MG Tab Take 3 mg by mouth every day.     • celecoxib (CELEBREX) 200 MG Cap Take 200 mg by mouth every day.     • DULoxetine (CYMBALTA) 60 MG Cap DR Particles delayed-release capsule Take 120 mg by mouth every day.     • metaxalone (SKELAXIN) 800 MG Tab Take 800 mg by mouth every 12 hours as needed.     • propranolol CR (INDERAL LA) 80 MG CAPSULE SR 24 HR Take 80 mg by mouth every day.     • omeprazole (PRILOSEC) 40 MG delayed-release capsule Take 40 mg by mouth every day.     • fluticasone-salmeterol (ADVAIR) 250-50 MCG/DOSE AEROSOL POWDER, BREATH ACTIVATED Inhale 1 Puff every 12 hours. 60 Each 3   • ergocalciferol (DRISDOL) 80917 UNIT capsule Take 1 Cap by mouth. Take 2 a week for one month, then 1 weekly. Refills for 4 at a time 8 Cap 11   • ZYRTEC 10 MG PO TABS Take 10 mg by mouth every day.       No current facility-administered medications for this visit.       REVIEW OF SYSTEMS  Constitutional: Denies fevers, Denies weight changes  Ears/Nose/Throat/Mouth: Denies nasal congestion or sore throat   Cardiovascular: Denies chest pain  Respiratory: Denies shortness of breath, Denies cough  Gastrointestinal/Hepatic: Denies nausea, vomiting  Sleep: see HPI    Physical Examination:  Vitals/ General Appearance:   Weight/BMI: Body mass index is 40.88 kg/m².  /70 (BP Location: Left arm, Patient Position: Sitting, BP Cuff Size: Large adult)   Pulse 77   Resp 16   Ht 1.702 m (5' 7\")   Wt 118 kg (261 " "lb)   SpO2 94%   Vitals:    02/17/22 1227   BP: 102/70   BP Location: Left arm   Patient Position: Sitting   BP Cuff Size: Large adult   Pulse: 77   Resp: 16   SpO2: 94%   Weight: 118 kg (261 lb)   Height: 1.702 m (5' 7\")       Pt. is alert and oriented to time, place and person. Cooperative and in no apparent distress.     Constitutional: Alert, no distress, well-groomed.  Skin: No rashes in visible areas.  Eye: Round. Conjunctiva clear, lids normal. No icterus.   ENT EXAM  Nasal alae/valves collapsible: Yes  Nasal septum deviation: Yes  Nasal turbinate hypertrophy: Left: Grade 1   Right: Grade 1  Hard palate narrow: No   Hard palate high: No   Soft palate/uvula (Mallampati score): 4  Tongue Scalloping: No   Retrognathia: No   Micrognathia: No   Cardiovascular:no murmus/gallops/rubs, normal S1 and S2 heart sounds, regular rate and rhythm  Pulmonary:Clear to auscultation, No wheezes, No crackles.  Neurologic:Cranial nerves II-XII grossly intact, Reflexes symmetrical, Normal age appropriate gait  Extremities: No clubbing, cyanosis, or edema       ASSESSMENT AND PLAN   Kasie Rodriguez is a 46 y.o. female with anxiety, depression, PTSD, moderate persistent asthma, morbid obesity, chronic pain, restless leg syndrome, hyperlipidemia, GERD, history of CVA, and chronic insomnia.  Presents to Sleep Clinic for evaluation of trouble falling asleep, staying asleep and unrefreshing sleep.    1. Kasie Rodriguez  has symptoms of Obstructive Sleep Apnea (SHARLENE). Kasie Rodriguez has symptoms of snoring, choking/gasping during sleep, witnessed apnea, dry mouth, morning headaches, unrefreshed upon awakening. These  interfere with activities of daily living.     Pt has risk factors for sleep apnea include obesity, thick neck, history of CVA and crowded oropharynx.     The pathophysiology of SHARLENE and the increased risk of cardiovascular morbidity from untreated SHARLENE is discussed in detail with the patient. She  also has " HTN, anxiety, depression, GERD, chronic pain, insomnia which can be worsened by SHARLENE.      We have discussed diagnostic options including in-laboratory, attended polysomnography and home sleep testing. We have also discussed treatment options including airway pressurization, reconstructive otolaryngologic surgery, dental appliances and weight management.     Subsequently,treatment options will be discussed based on the diagnostic study. Meanwhile, She is urged to avoid supine sleep, weight gain and alcoholic beverages since all of these can worsen SHARLENE. She is cautioned against drowsy driving. If She feels sleepy while driving, advised must pull over for a break/nap, rather than persist on the road, in the interest of Pt's own safety and that of others on the road.    Plan  -  She  will be scheduled for an overnight PSG to assess sleep related breathing disorder.  - Follow up 1-2 weeks after sleep study to discuss results and treatment options moving forward   -Advised to reach out via MyChart or by phone with any questions or concerns.     2.  Chronic Insomnia   With prolonged sleep latency, frequent awakenings with difficulty falling back asleep and feeling this is impacting daily functioning for 10 years meets criteria for chronic insomnia. Discussed potential causes of insomnia and importance of having a routine around bedtime. Advised to avoid laying in bed for more then 20-30 min if unable to fall asleep. Dicussed cognitive behavioral therapy for insomnia (CBTi) which is first line treatment for insomnia. Recommended pt participate in self directed CBTi as would likely benefit. Reviewed pharmacologic therapy which is second line treatment and may be considered if circumstances permit.     RECOMMENDATIONS  -Referral placed for CBT-I  -We also discussed a few interventions noted below to help with the insomnia:  -Maintain a regular sleep schedule  -Avoid caffeinated beverages after lunch, and alcohol in late  afternoon and evening  -Avoid prolonged use of light-emitting screens before bedtime  -Exercise regularly for at least 20 minutes, preferably more than four to five hours prior to bedtime  -Avoid daytime naps, especially if they are longer than 20 to 30 minutes or occur late in the day  -Advised to contact our office or myself with any questions via e|tabt    3.  Restless leg syndrome  She has an improvement with Lyrica compared to previously.  This may be contributing to her insomnia.  In addition restless legs can be made worse by untreated sleep apnea.  We will continue to monitor status  Regarding treatment of other past medical problems and general health maintenance,  Pt is urged to follow up with PCP.      Please note portions of this record was created using voice recognition software. I have made every reasonable attempt to correct obvious errors, but I expect that there are errors of grammar and possibly content I did not discover before finalizing the note.

## 2022-02-21 DIAGNOSIS — F33.2 SEVERE EPISODE OF RECURRENT MAJOR DEPRESSIVE DISORDER, WITHOUT PSYCHOTIC FEATURES (HCC): ICD-10-CM

## 2022-02-22 RX ORDER — BUPROPION HYDROCHLORIDE 150 MG/1
TABLET ORAL
Qty: 30 TABLET | Refills: 1 | Status: SHIPPED | OUTPATIENT
Start: 2022-02-22 | End: 2022-03-21

## 2022-03-05 DIAGNOSIS — F43.10 PTSD (POST-TRAUMATIC STRESS DISORDER): ICD-10-CM

## 2022-03-07 RX ORDER — ZOLPIDEM TARTRATE 10 MG/1
10 TABLET ORAL NIGHTLY PRN
Qty: 30 TABLET | Refills: 0 | Status: SHIPPED | OUTPATIENT
Start: 2022-03-07 | End: 2022-04-04

## 2022-03-12 ENCOUNTER — HOSPITAL ENCOUNTER (OUTPATIENT)
Dept: PULMONOLOGY | Facility: MEDICAL CENTER | Age: 47
End: 2022-03-12
Attending: STUDENT IN AN ORGANIZED HEALTH CARE EDUCATION/TRAINING PROGRAM
Payer: COMMERCIAL

## 2022-03-12 DIAGNOSIS — J45.40 MODERATE PERSISTENT ASTHMA WITHOUT COMPLICATION: ICD-10-CM

## 2022-03-12 DIAGNOSIS — R06.09 DYSPNEA ON EXERTION: ICD-10-CM

## 2022-03-12 PROCEDURE — 94060 EVALUATION OF WHEEZING: CPT | Mod: 26 | Performed by: INTERNAL MEDICINE

## 2022-03-12 PROCEDURE — 94729 DIFFUSING CAPACITY: CPT | Mod: 26 | Performed by: INTERNAL MEDICINE

## 2022-03-12 PROCEDURE — 94060 EVALUATION OF WHEEZING: CPT

## 2022-03-12 PROCEDURE — 94726 PLETHYSMOGRAPHY LUNG VOLUMES: CPT

## 2022-03-12 PROCEDURE — 94729 DIFFUSING CAPACITY: CPT

## 2022-03-12 PROCEDURE — 94726 PLETHYSMOGRAPHY LUNG VOLUMES: CPT | Mod: 26 | Performed by: INTERNAL MEDICINE

## 2022-03-12 RX ORDER — LEVALBUTEROL INHALATION SOLUTION 1.25 MG/3ML
1.25 SOLUTION RESPIRATORY (INHALATION)
Status: DISCONTINUED | OUTPATIENT
Start: 2022-03-12 | End: 2022-03-13 | Stop reason: HOSPADM

## 2022-03-12 RX ADMIN — LEVALBUTEROL INHALATION SOLUTION 1.25 MG: 1.25 SOLUTION RESPIRATORY (INHALATION) at 09:10

## 2022-03-12 ASSESSMENT — PULMONARY FUNCTION TESTS
FEV1_PERCENT_CHANGE: -3
FEV1/FVC_PERCENT_PREDICTED: 80
FEV1_LLN: 2.64
FVC: 2.33
FVC_PERCENT_PREDICTED: 59
FEV1_PERCENT_CHANGE: 0
FEV1_PERCENT_PREDICTED: 53
FEV1/FVC_PERCENT_LLN: 67.42
FEV1/FVC_PERCENT_LLN: 67.42
FEV1/FVC_PREDICTED: 80.74
FEV1_LLN: 2.64
FVC_LLN: 3.29
FEV1/FVC: 73.01
FEV1/FVC_PERCENT_PREDICTED: 87
FEV1: 1.64
FEV1/FVC: 70.39
FVC_PERCENT_PREDICTED: 58
FVC_LLN: 3.29
FEV1/FVC_PERCENT_CHANGE: -3
FEV1/FVC: 70.42
FEV1/FVC_PERCENT_PREDICTED: 86
FEV1/FVC: 73
FEV1_PREDICTED: 3.16
FEV1/FVC_PERCENT_PREDICTED: 90
FEV1/FVC_PERCENT_PREDICTED: 91
FVC: 2.32
FEV1_PERCENT_PREDICTED: 51
FEV1: 1.69
FVC_PREDICTED: 3.94

## 2022-03-13 NOTE — PROCEDURES
DATE OF SERVICE:  03/12/2022     PULMONARY FUNCTION TEST INTERPRETATION     REFERRING PROVIDER:  Leslie Jean-Baptiste DO     INTERPRETING PROVIDER:  Mychal Tristan III, MD     INTERPRETATION:  1.  There is a proportional reduction in the forced volumes on spirometry.    There is no significant obstruction.  There is also no significant response to   bronchodilators.  The reduced maximum voluntary ventilation is proportional   to the degree of reduction in FEV1.  2.  Lung volumes demonstrated a reduced expiratory reserve volume, 20%   predicted; likely attributable to the patient's reported BMI of 40.9.  Total   lung capacity is normal.  The combination of reduced forced volumes on   spirometry, with a normal total lung capacity, is a nonspecific PFT pattern   that could be seen in obesity.  3.  Lung volumes demonstrate air trapping, evidenced by the elevated RV and   RV/TLC ratio.  4.  Diffusion capacity is normal.  5.  While not evident on spirometry, flow volume loop is suggestive of some   element of obstruction, particularly in the mid flow rates.  Again, there is   no significant response to bronchodilators.  6.  Overall, this study is suggestive of a mixed obstructive and restrictive   pattern.  The obstruction is evident on the mid flow rates of spirometry as   well as with the air trapping seen on lung volumes.  The restriction is likely   extrathoracic pseudo-restriction due to body habitus.  7.  No prior PFTs for comparison.        ______________________________  Mychal Tristan III, MD    WBG/JAN    DD:  03/12/2022 16:45  DT:  03/12/2022 17:37    Job#:  [id number]

## 2022-03-20 DIAGNOSIS — F33.2 SEVERE EPISODE OF RECURRENT MAJOR DEPRESSIVE DISORDER, WITHOUT PSYCHOTIC FEATURES (HCC): ICD-10-CM

## 2022-03-21 RX ORDER — BUPROPION HYDROCHLORIDE 150 MG/1
TABLET ORAL
Qty: 30 TABLET | Refills: 1 | Status: SHIPPED | OUTPATIENT
Start: 2022-03-21 | End: 2022-04-18

## 2022-03-30 ENCOUNTER — HOSPITAL ENCOUNTER (OUTPATIENT)
Facility: MEDICAL CENTER | Age: 47
End: 2022-03-30
Attending: STUDENT IN AN ORGANIZED HEALTH CARE EDUCATION/TRAINING PROGRAM
Payer: COMMERCIAL

## 2022-03-30 ENCOUNTER — OFFICE VISIT (OUTPATIENT)
Dept: MEDICAL GROUP | Facility: PHYSICIAN GROUP | Age: 47
End: 2022-03-30
Payer: COMMERCIAL

## 2022-03-30 VITALS
TEMPERATURE: 97.4 F | WEIGHT: 264 LBS | OXYGEN SATURATION: 95 % | HEART RATE: 66 BPM | DIASTOLIC BLOOD PRESSURE: 88 MMHG | BODY MASS INDEX: 40.01 KG/M2 | HEIGHT: 68 IN | SYSTOLIC BLOOD PRESSURE: 136 MMHG

## 2022-03-30 DIAGNOSIS — R32 URINARY INCONTINENCE, UNSPECIFIED TYPE: ICD-10-CM

## 2022-03-30 DIAGNOSIS — R60.0 LOWER EXTREMITY EDEMA: ICD-10-CM

## 2022-03-30 DIAGNOSIS — Z86.010 HISTORY OF COLONIC POLYPS: ICD-10-CM

## 2022-03-30 DIAGNOSIS — K92.1 BLOOD IN STOOL: ICD-10-CM

## 2022-03-30 DIAGNOSIS — R10.30 LOWER ABDOMINAL PAIN: ICD-10-CM

## 2022-03-30 DIAGNOSIS — E78.5 DYSLIPIDEMIA: ICD-10-CM

## 2022-03-30 DIAGNOSIS — K58.1 IRRITABLE BOWEL SYNDROME WITH CONSTIPATION: ICD-10-CM

## 2022-03-30 PROCEDURE — 99214 OFFICE O/P EST MOD 30 MIN: CPT | Performed by: STUDENT IN AN ORGANIZED HEALTH CARE EDUCATION/TRAINING PROGRAM

## 2022-03-30 PROCEDURE — 81003 URINALYSIS AUTO W/O SCOPE: CPT

## 2022-03-30 RX ORDER — ATORVASTATIN CALCIUM 20 MG/1
20 TABLET, FILM COATED ORAL DAILY
Qty: 90 TABLET | Refills: 3 | Status: SHIPPED | OUTPATIENT
Start: 2022-03-30

## 2022-03-30 RX ORDER — ATORVASTATIN CALCIUM 20 MG/1
1 TABLET, FILM COATED ORAL
COMMUNITY
Start: 2022-02-18 | End: 2022-03-30

## 2022-03-30 RX ORDER — HYDROCHLOROTHIAZIDE 25 MG/1
25 TABLET ORAL DAILY
Qty: 30 TABLET | Refills: 0 | Status: SHIPPED | OUTPATIENT
Start: 2022-03-30 | End: 2022-04-26

## 2022-03-30 ASSESSMENT — FIBROSIS 4 INDEX: FIB4 SCORE: 1.41

## 2022-03-30 NOTE — PATIENT INSTRUCTIONS
Restart miralax and fiber    We are switching furosemide to hydrochlorothiazide for the edema.  You could consider compression stockings as well to help.  Weight loss and exercise also may improve this.

## 2022-03-30 NOTE — PROGRESS NOTES
Subjective:     Chief Complaint   Patient presents with   • Follow-Up     Abdominal pain, blood in stool      HPI:   Kasie presents today with lower abdominal pain for at least the past month.    Patient has a history of irritable bowel syndrome mostly with constipation.  Last visit she complained of diarrhea after some medication changes.  She states that that resolved but at the time she held off on her MiraLAX and fiber which she has stayed off of.  She reports now constipation with bloating and cramping lower abdominal pain with now scant blood with wiping just today.  She does have concerns because she has a history of colon polyps and she believes she is due for repeat colonoscopy.  She has started weight watchers to try for intentional weight loss, on our scale her weight is stable.  She denies any nausea or vomiting, does take promethazine for when she gets migraines which is very effective.  She admits to a considerable amount of stress as the foster children who she had for over a year (was only supposed to have for brief period originally) will be rehome with a different  in the next week or so, unfortunately the children will have to be split up.  As expected she is definitely struggling with this, but has been told by others in her [place] that this will be of benefit to her.    She is wondering about worsening chronic urinary incontinence.  Previously she mostly had just stress incontinence however since starting furosemide for her lower extremity edema she has noticed that it has significantly worsened and even has to use a pad at night.  She gets her furosemide in the morning.  It does help with her lower extremity edema.  She has not tried hydrochlorothiazide in the past.  She denies any burning with urination, hematuria or odor.  Admits to urinary frequency furosemide.    Needs a refill of her atorvastatin. She has not yet heard back by neurosurgery for her appointment, has left  voicemails.  Plans to follow-up.    Current Outpatient Medications Ordered in Epic   Medication Sig Dispense Refill   • hydroCHLOROthiazide (HYDRODIURIL) 25 MG Tab Take 1 Tablet by mouth every day. 30 Tablet 0   • atorvastatin (LIPITOR) 20 MG Tab Take 1 Tablet by mouth every day. 90 Tablet 3   • buPROPion (WELLBUTRIN XL) 150 MG XL tablet TAKE 1 TABLET BY MOUTH EVERY DAY IN THE MORNING 30 Tablet 1   • cyclobenzaprine (FLEXERIL) 10 mg Tab Take 1 Tablet by mouth every day. 90 Tablet 1   • promethazine (PHENERGAN) 25 MG Tab Take 1 Tablet by mouth every 6 hours as needed for Nausea/Vomiting. 30 Tablet 0   • zolpidem (AMBIEN) 10 MG Tab TAKE 1 TABLET BY MOUTH AT BEDTIME AS NEEDED FOR SLEEP FOR UP TO 30 DAYS. 30 Tablet 0   • ketoconazole (NIZORAL) 2 % Cream Apply 1 Application topically every day. 30 g 1   • pregabalin (LYRICA) 200 MG capsule Take 200mg by mouth in the morning and 400mg at night 270 Capsule 0   • calcipotriene (DOVONEX) 0.005 % Cream      • Acetaminophen (TYLENOL PO) Take  by mouth.     • levalbuterol (XOPENEX HFA) 45 MCG/ACT inhaler Inhale 1-2 Puffs every four hours as needed for Shortness of Breath (asthma symptoms). 30 g 3   • REXULTI 3 MG Tab Take 3 mg by mouth every day.     • celecoxib (CELEBREX) 200 MG Cap Take 200 mg by mouth every day.     • DULoxetine (CYMBALTA) 60 MG Cap DR Particles delayed-release capsule Take 120 mg by mouth every day.     • metaxalone (SKELAXIN) 800 MG Tab Take 800 mg by mouth every 12 hours as needed.     • propranolol CR (INDERAL LA) 80 MG CAPSULE SR 24 HR Take 80 mg by mouth every day.     • omeprazole (PRILOSEC) 40 MG delayed-release capsule Take 40 mg by mouth every day.     • fluticasone-salmeterol (ADVAIR) 250-50 MCG/DOSE AEROSOL POWDER, BREATH ACTIVATED Inhale 1 Puff every 12 hours. 60 Each 3   • ergocalciferol (DRISDOL) 81646 UNIT capsule Take 1 Cap by mouth. Take 2 a week for one month, then 1 weekly. Refills for 4 at a time 8 Cap 11   • ZYRTEC 10 MG PO TABS Take 10  "mg by mouth every day.       No current [facility]-ordered facility-administered medications on file.     ROS:  Gen: no fevers/chills, no changes in weight  Eyes: no changes in vision  ENT: no sore throat  Pulm: no sob, no cough  CV: no chest pain, no palpitations  GI: no nausea/vomiting, no diarrhea  : no dysuria  MSk: Some back spasms with walking  Skin: no rash  Neuro: no frequent or severe headaches, gait has improved  Heme/Lymph: no easy bruising    Objective:     Exam:  /88 (BP Location: Left arm, Patient Position: Sitting, BP Cuff Size: Large adult)   Pulse 66   Temp 36.3 °C (97.4 °F) (Temporal)   Ht 1.715 m (5' 7.5\")   Wt 120 kg (264 lb)   LMP 07/28/2006   SpO2 95%   BMI 40.74 kg/m²  Body mass index is 40.74 kg/m².    Physical Exam:  Constitutional: Well-developed and well-nourished. No acute distress.   Skin: Skin is warm and dry. No rash noted.  Head: Atraumatic without lesions.  Eyes: Conjunctivae and extraocular motions are normal. Pupils are equal, round. No scleral icterus.   Ears:  External ears unremarkable. Tympanic membranes clear and intact.  Nose: Nares patent. No discharge.  Mouth/Throat: Oropharynx is clear and moist. Posterior pharynx without erythema or exudates.  Neck: Supple, trachea midline. Normal range of motion. No thyromegaly present. No lymphadenopathy--cervical or supraclavicular.  Cardiovascular: Regular rate and rhythm, S1 and S2 without murmur, rubs, or gallops.  Lungs: Normal inspiratory effort, CTA bilaterally, no wheezes/rhonchi/rales  Abdomen: Obese. Soft, mild generalized ttp in lower abdomen, mostly LLQ. Without distention. Active bowel sounds. No rebound, guarding, masses or HSM.  Extremities: No cyanosis, clubbing, erythema. Trace pitting edema bilateral LE to mid shin.  Neurological: Alert and oriented x 3. No gross/focal deficits.  Psychiatric:  Behavior, mood, and affect are appropriate.    Assessment & Plan:     46 y.o. female with the following -     1. Lower " abdominal pain  Acute in this patient with history of irritable bowel syndrome with obstipation which is a current issue.  Recommend she restart her bowel regimen and will check urine analysis given complaint as below.  - URINALYSIS,CULTURE IF INDICATED; Future    2. Irritable bowel syndrome with constipation  This is a chronic condition, recommend she restart her fiber and MiraLAX to titrate to soft stools.  Relative dehydration from furosemide may also be contributing to constipation.  Encouraged hydration and fiber in the diet.  Would benefit from gastroenterology evaluation and treatment as well, referred.  May also be due for repeat colonoscopy-no records to review at this time.  - Referral to Gastroenterology    3. History of colonic polyps  4. Blood in stool  Patient reports history of abnormal colonoscopy, believes she may be overdue, no records to review at this time.  She declines rectal exam today.  Stressed importance of soft stools.  Follow-up with worsening blood in the stool, referred to gastroenterology.  - Referral to Gastroenterology    5. Lower extremity edema  This is a chronic condition, reviewed echocardiogram from 10/2021.  Will trial hydrochlorothiazide instead of furosemide as below given complaint of worsening urinary incontinence.  - hydroCHLOROthiazide (HYDRODIURIL) 25 MG Tab; Take 1 Tablet by mouth every day.  Dispense: 30 Tablet; Refill: 0    6. Urinary incontinence, unspecified type  This is a chronic condition, worsened with addition of furosemide.  May benefit from pelvic floor physical therapy as she reports prior stress incontinence.  Also states that she used to be followed by urologist, she is interested in urogynecology evaluation and treatment-referred.  - Referral to Physical Therapy  - URINALYSIS,CULTURE IF INDICATED; Future  - Referral to Urogynecology    7. Dyslipidemia  Chronic, well controlled. No changes to current treatment.  - atorvastatin (LIPITOR) 20 MG Tab; Take 1  Tablet by mouth every day.  Dispense: 90 Tablet; Refill: 3    Return in 4 weeks (on 4/27/2022), or if symptoms worsen or fail to improve.    Please note that this dictation was created using voice recognition software. I have made every reasonable attempt to correct obvious errors, but I expect that there are errors of grammar and possibly content that I did not discover before finalizing the note.

## 2022-03-31 LAB
APPEARANCE UR: CLEAR
BILIRUB UR QL STRIP.AUTO: NEGATIVE
COLOR UR: YELLOW
GLUCOSE UR STRIP.AUTO-MCNC: NEGATIVE MG/DL
KETONES UR STRIP.AUTO-MCNC: NEGATIVE MG/DL
LEUKOCYTE ESTERASE UR QL STRIP.AUTO: NEGATIVE
MICRO URNS: NORMAL
NITRITE UR QL STRIP.AUTO: NEGATIVE
PH UR STRIP.AUTO: 5.5 [PH] (ref 5–8)
PROT UR QL STRIP: NEGATIVE MG/DL
RBC UR QL AUTO: NEGATIVE
SP GR UR STRIP.AUTO: 1.01
UROBILINOGEN UR STRIP.AUTO-MCNC: 0.2 MG/DL

## 2022-04-03 DIAGNOSIS — F43.10 PTSD (POST-TRAUMATIC STRESS DISORDER): ICD-10-CM

## 2022-04-03 DIAGNOSIS — F41.1 GENERALIZED ANXIETY DISORDER: ICD-10-CM

## 2022-04-04 RX ORDER — ZOLPIDEM TARTRATE 10 MG/1
10 TABLET ORAL NIGHTLY PRN
Qty: 30 TABLET | Refills: 0 | Status: SHIPPED | OUTPATIENT
Start: 2022-04-04 | End: 2022-05-04

## 2022-04-04 RX ORDER — ALPRAZOLAM 1 MG/1
1 TABLET ORAL NIGHTLY PRN
Qty: 15 TABLET | Refills: 0 | Status: SHIPPED | OUTPATIENT
Start: 2022-04-04 | End: 2022-05-04

## 2022-04-05 ENCOUNTER — APPOINTMENT (OUTPATIENT)
Dept: SLEEP MEDICINE | Facility: MEDICAL CENTER | Age: 47
End: 2022-04-05
Payer: COMMERCIAL

## 2022-04-14 ENCOUNTER — PATIENT MESSAGE (OUTPATIENT)
Dept: MEDICAL GROUP | Facility: PHYSICIAN GROUP | Age: 47
End: 2022-04-14
Payer: COMMERCIAL

## 2022-04-14 DIAGNOSIS — R32 URINARY INCONTINENCE, UNSPECIFIED TYPE: ICD-10-CM

## 2022-04-14 DIAGNOSIS — R25.1 TREMOR: ICD-10-CM

## 2022-04-14 DIAGNOSIS — R90.89 ABNORMAL BRAIN MRI: ICD-10-CM

## 2022-04-14 DIAGNOSIS — R26.9 GAIT ABNORMALITY: ICD-10-CM

## 2022-04-14 NOTE — PROGRESS NOTES
IMPRESSION:     1.  10 mm x 7 mm x 7 mm notch-like defect in the posterior-inferior midline cerebellar vermis. This was not present on MRI cervical spine from 3/7/2011. The lesion is isointense with CSF and appears contiguous with cisterna magna. Differential   considerations would include development of an arachnoid cyst invaginating the cerebellar vermis versus macrocystic encephalomalacia extending to the vermian surface consistent with an old infarct or other remote cerebellar vermis insult. Uncertain   whether this may be related to the patient's unsteady gait.  ___  Patient referred to spinal surgery/orthopedic spine surgery, new referral placed for general neurosurgery given her MRI findings and complaints.  I am not certain that the authorized provider would be able to aid us with this concern.    Patient reports concern for tremor as well, will also referral to general neurology.

## 2022-04-15 ENCOUNTER — PATIENT MESSAGE (OUTPATIENT)
Dept: MEDICAL GROUP | Facility: PHYSICIAN GROUP | Age: 47
End: 2022-04-15
Payer: COMMERCIAL

## 2022-04-15 NOTE — LETTER
[facility]  [address]  [facility] [address]     April 22, 2022    Patient: Kasie Rodriguez   YOB: 1975   Date of Visit: 4/15/2022       To Whom It May Concern:    Kasie Rodriguez was seen and treated in our department on 3/30/2022. She may undergo a routine dental cleaning.    Sincerely,       Leslie Jean-Baptiste D.O.

## 2022-04-16 DIAGNOSIS — F33.2 SEVERE EPISODE OF RECURRENT MAJOR DEPRESSIVE DISORDER, WITHOUT PSYCHOTIC FEATURES (HCC): ICD-10-CM

## 2022-04-18 RX ORDER — BUPROPION HYDROCHLORIDE 150 MG/1
TABLET ORAL
Qty: 30 TABLET | Refills: 1 | Status: SHIPPED | OUTPATIENT
Start: 2022-04-18 | End: 2022-05-02

## 2022-04-25 ENCOUNTER — OFFICE VISIT (OUTPATIENT)
Dept: NEUROLOGY | Facility: MEDICAL CENTER | Age: 47
End: 2022-04-25
Attending: PSYCHIATRY & NEUROLOGY
Payer: COMMERCIAL

## 2022-04-25 VITALS
DIASTOLIC BLOOD PRESSURE: 88 MMHG | SYSTOLIC BLOOD PRESSURE: 128 MMHG | WEIGHT: 259.9 LBS | HEIGHT: 67 IN | TEMPERATURE: 98.1 F | BODY MASS INDEX: 40.79 KG/M2 | HEART RATE: 74 BPM | OXYGEN SATURATION: 95 %

## 2022-04-25 DIAGNOSIS — R26.9 GAIT DISTURBANCE: ICD-10-CM

## 2022-04-25 DIAGNOSIS — R93.0 ABNORMAL MRI OF HEAD: ICD-10-CM

## 2022-04-25 PROCEDURE — 99204 OFFICE O/P NEW MOD 45 MIN: CPT | Performed by: PSYCHIATRY & NEUROLOGY

## 2022-04-25 PROCEDURE — 99212 OFFICE O/P EST SF 10 MIN: CPT | Performed by: PSYCHIATRY & NEUROLOGY

## 2022-04-25 ASSESSMENT — FIBROSIS 4 INDEX: FIB4 SCORE: 1.41

## 2022-04-25 NOTE — PROGRESS NOTES
Chief Complaint   Patient presents with   • New Patient     General Neurology     Patient is referred by Leslie Jean-Baptiste D.O. for initial consult.    History of present illness:  Kasie Rodriguez 46 y.o. female presents today for abnormal brain MRI.   History is obtained from patient and significant other.  and Patient is accompanied by  Remy. Occupation: nurse    Duration/timing: ~10/2021  Context:   Abnormal MRI brain: Patient had an MRI of the brain and spine performed after having symptoms starting in October 2021 when she went to [place].  After arriving by the second day she suffered a bad respiratory infection suspected viral producing much sputum, shortness of breath, fevers, diarrhea with a total duration of 10 days.  She did not officially test to see if she had COVID.  Nobody else in the family got sick.  When the trip was over she drove back from [place] to [place] in a total of 3 days and experienced swelling her legs.  Other symptoms included cognitive fog, difficult occultly speaking when she is frustrated, trouble finding words, imbalance.  Recently, she states she has good days and bad days, uncertain causes of edema in the legs.  Left arm feels week. Right handed.  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 Chronic anxiety and depression. Seizure as a child on dilantin - thought to be febrile.   Denies: vision changes/loss or diplopia, other weakness, depression, anxiety, hearing loss, loss of consciousness and hallucinations, hx of EtOH abuse, bowel incontinence,  saddle anesthesia.    Past medical history:   Past Medical History:   Diagnosis Date   • Arthralgia of multiple joints 3/8/2011   • Arthritis    • ASTHMA 1/20/2010   • Elevated antinuclear antibody (JOSE C) level 3/8/2011   • Personal history of  hydronephrosis    • Rhinitis, allergic 2010   • Vitamin d deficiency 3/8/2011       Past surgical history:   Past Surgical History:   Procedure Laterality Date   • ABDOMINAL HYSTERECTOMY TOTAL      endometriosis and bleeding   • OOPHORECTOMY Bilateral    • SALPINGECTOMY Bilateral    • ABDOMINAL EXPLORATION      endometriosis   • CHOLECYSTECTOMY     • TONSILLECTOMY         Family history:   Family History   Problem Relation Age of Onset   • Arthritis Mother    • Heart Disease Mother    • Colon Cancer Father         70s   • Alcohol abuse Maternal Grandmother    • Suicide Attempts Maternal Grandmother          by suicide   • Bipolar disorder Maternal Grandmother    • Alcohol abuse Maternal Grandfather    • Brain Cancer Maternal Grandfather    • Alcohol abuse Paternal Grandmother    • Breast Cancer Paternal Grandmother          of breast CA   • Alcohol abuse Paternal Grandfather    • Heart Disease Paternal Grandfather    • Other Daughter         parotid tumor, vertigo   • Brain Cancer Son        Social history:   Tobacco Use   • Smoking status: Never Smoker   • Smokeless tobacco: Never Used   Vaping Use   • Vaping Use: Never used   Substance and Sexual Activity   • Alcohol use: Yes     Comment: 1 glass of wine on occasion   • Drug use: Never     Current medications:   Current Outpatient Medications   Medication   • buPROPion (WELLBUTRIN XL) 150 MG XL tablet   • zolpidem (AMBIEN) 10 MG Tab   • ALPRAZolam (XANAX) 1 MG Tab   • hydroCHLOROthiazide (HYDRODIURIL) 25 MG Tab   • atorvastatin (LIPITOR) 20 MG Tab   • cyclobenzaprine (FLEXERIL) 10 mg Tab   • promethazine (PHENERGAN) 25 MG Tab   • ketoconazole (NIZORAL) 2 % Cream   • pregabalin (LYRICA) 200 MG capsule   • calcipotriene (DOVONEX) 0.005 % Cream   • Acetaminophen (TYLENOL PO)   • levalbuterol (XOPENEX HFA) 45 MCG/ACT inhaler   • REXULTI 3 MG Tab   • celecoxib (CELEBREX) 200 MG Cap   • DULoxetine (CYMBALTA) 60 MG Cap DR Particles  "delayed-release capsule   • metaxalone (SKELAXIN) 800 MG Tab   • propranolol CR (INDERAL LA) 80 MG CAPSULE SR 24 HR   • omeprazole (PRILOSEC) 40 MG delayed-release capsule   • fluticasone-salmeterol (ADVAIR) 250-50 MCG/DOSE AEROSOL POWDER, BREATH ACTIVATED   • ergocalciferol (DRISDOL) 10672 UNIT capsule     No current facility-administered medications for this visit.       Medication Allergy:  Allergies   Allergen Reactions   • Biaxin [Clarithromycin] Vomiting   • Latex        ROS - per HPI    Physical examination:   Vitals:    04/25/22 1450   BP: 128/88   BP Location: Right arm   Patient Position: Sitting   BP Cuff Size: Adult   Pulse: 74   Temp: 36.7 °C (98.1 °F)   TempSrc: Temporal   SpO2: 95%   Weight: 118 kg (259 lb 14.4 oz)   Height: 1.702 m (5' 7\")     General: Patient in well nourished in no apparent distress. Elevated BMI.  HENT: Normocephalic, atraumatic.   Cardiovascular: No significant lower extremity edema.  Respiratory: Normal respiratory effort.   Psychiatric: Pleasant.     NEUROLOGICAL EXAM:   Mental status: Awake, alert and fully oriented to person, place, time and situation. Normal attention, concentration and fund of knowledge for education level.   Speech and language: Speech is fluent without errors and clear.  Cranial nerve exam:  II: Pupils are equally round and reactive to light. Visual fields are intact by confrontation.  III, IV, VI: EOMI - except patient cannot follow my finger on left gaze, no diplopia, no ptosis. She can voluntarily look left.  V: Sensation to light touch is normal over V1-3 distributions bilaterally.    VII: Facial movements are symmetrical. There is no facial droop.   VIII: Hearing intact to soft speech and finger rub bilaterally  IX: Palate elevates symmetrically, uvula is midline. Dysarthria is not present.  XI: Shoulder shrug are symmetrical and strong.   XII: Tongue protrudes midline.    Motor exam:  Increased tone in the legs. Normal in the arms. Rapid finger " tapping of good amplitude and speed.      Muscle strength:     Right  Left  Deltoid   5/5  5/5        Biceps   5/5  5/5  Triceps  5/5  Effort?/5   encouragement   Wrist extensors 5/5  5/5  Wrist flexors  5/5  5/5     5/5  5/5  Interossei  5/5  5/5  Hip flexors  5/5  Effort/5   Quadriceps  5/5  5/5   Hamstrings  5/5  effort/5  Dorsiflexors  5/5  5/5  Plantarflexors  5/5  5/5  Foot inversion  5/5  effort/5  Foot eversion  5/5  effort/5  NT = not tested    Sensory exam:  Intact to Light touch and Temperature in bilateral upper and lower extremity. Mild reduced vibration in the hallux bilaterally.    Reflexes:       Right  Left  Biceps   1/4  1/4  Triceps  0/4  0/4  Brachioradialis 0/4  0/4  Knee jerk  3/4  3/4  Ankle jerk  4/4  4/4 Clonus 4 beats  bilateral toes are downgoing to plantar stimulation.    Coordination: shows a normal finger-nose-finger and heel to shin bilaterally. No dysdiedokinesia bilateral upper extremities.  Gait: cautious, looks at ground, shuffles, reduced right arm swing?      ANCILLARY DATA REVIEWED:   Lab Data Review:  Lab Results   Component Value Date/Time    WBC 7.5 12/10/2021 04:31 PM    RBC 5.31 12/10/2021 04:31 PM    HEMOGLOBIN 15.3 12/10/2021 04:31 PM    HEMATOCRIT 45.8 12/10/2021 04:31 PM    MCV 86.3 12/10/2021 04:31 PM    MCH 28.8 12/10/2021 04:31 PM    MCHC 33.4 (L) 12/10/2021 04:31 PM    MPV 10.6 12/10/2021 04:31 PM    NEUTSPOLYS 52.40 12/10/2021 04:31 PM    LYMPHOCYTES 33.80 12/10/2021 04:31 PM    MONOCYTES 6.40 12/10/2021 04:31 PM    EOSINOPHILS 6.10 12/10/2021 04:31 PM    BASOPHILS 0.80 12/10/2021 04:31 PM      Lab Results   Component Value Date/Time    SODIUM 138 01/03/2022 08:28 AM    POTASSIUM 4.6 01/03/2022 08:28 AM    CHLORIDE 103 01/03/2022 08:28 AM    CO2 21 01/03/2022 08:28 AM    GLUCOSE 107 (H) 01/03/2022 08:28 AM    BUN 19 01/03/2022 08:28 AM    CREATININE 0.91 01/03/2022 08:28 AM    CREATININE 0.91 02/23/2011 09:27 AM    BUNCREATRAT 19 02/23/2011 09:27 AM    GLOMRATE  >59 02/23/2011 09:27 AM     Lab Results   Component Value Date/Time    ASTSGOT 34 12/10/2021 1631    ALTSGPT 37 12/10/2021 1631    ALKPHOSPHAT 97 12/10/2021 1631    ALBUMIN 4.2 12/10/2021 1631     Workup to date:  -LDL 83, triglycerides 144      Imaging:   MRI brain without 1/2022:  10 mm x 7 mm x 7 mm notch-like defect in the posterior-inferior midline cerebellar vermis. This was not present on MRI cervical spine from 3/7/2011. The lesion is isointense with CSF and appears contiguous with cisterna magna. Differential   considerations would include development of an arachnoid cyst invaginating the cerebellar vermis versus macrocystic encephalomalacia extending to the vermian surface consistent with an old infarct or other remote cerebellar vermis insult. Uncertain whether this may be related to the patient's unsteady gait.    MRI C spine without contrast 1/2022:  Mild degenerative disease in the cervical spine as described above. When compared with the previous MRI dated 3/7/2011, there has been mild interval progression of the degenerative disease.    MRI L spine without 1/2022:  There is central and right paracentral disc extrusion at L1-2. The extruded disc fragment migrated upwards. There is mild effacement of the ventral subarachnoid space. There is no significant canal compromise.    Records reviewed: PCP note reviewed dated March 2022.        ASSESSMENT AND PLAN:    1. Abnormal MRI of head: Patient presenting with generalized symptoms and cognitive fog since October 2021 after viral infection.  Incidental finding of notch like defect in the posterior inferior midline cerebellar vermis in January 2022 which is new as of her MRI C-spine in March 2011.  On review of the images with neuroradiology this is of unclear etiology but is chronic but not sure how chronic.  She does have a history of hypertension -lipids are otherwise controlled and no known history of diabetes.  She does not use illicit  substances.  -HEMOGLOBIN A1C; Future  -MRA head to evaluate basilar artery/vertebral arteries  -Repeat MRI brain with without contrast in 6 to 12 months    2. Degenerative disc disease of the lumbar spine: Central and right paracentral disc extrusion at L1 -2 with mild effacement of the ventral subarachnoid space without significant canal compromise.  Patient is reporting urinary urgency of unclear etiology.  Does not seem to have urinary retention or saddle anesthesia.  Not entirely convinced that L-spine findings explain her neurological exam findings.    3. Imbalance: Patient presenting with reports of imbalance and gait disturbance of unclear etiology.  She does have increased tone and hyperreflexia in the lower extremities in the setting of problem #1.  Based on her history this is of relatively subacute to chronic in nature.  There is some loss of vibratory sensation which may suggest an superimposed peripheral neuropathy.  Evaluate for metabolic causes as well which can present with a peripheral and central picture.  So for imaging of the central nervous system without clear structural pathology.  -EMG/NCS lower extremities - declines, tried in her arms and did not tolerate  -Neuropathy work-up: Serum immunofixation, TSH, free T4, B12, folate, B1, B6, zinc, copper, RPR, HIV  -MRI T spine with without contrast      FOLLOW-UP: Return in about 3 months (around 7/25/2022). Review workup  EDUCATION AND COUNSELING:  -I personally discussed the following with the patient:   Differential diagnosis and medical reasoning as above, reasons for further work-up and imaging    The patient/family communicates understanding of the above and agrees that due to the complexity of the diagnosis/management, results of any testing and further recommendations will typically be discussed/made during a face to face encounter in my office. The patient and/or family further understands it is their responsibility to keep proper follow  up for safe and effective management of their condition. Failure to do so, may result in discharge from [facility].     This dictation was created using voice recognition software. I have made every reasonable attempt to avoid dictation errors, but this document may contain an error not identified before finalizing. If the error changes the accuracy of the document, I would appreciate it being brought to my attention. Thank you.      Jeannette León MD  Neurology

## 2022-04-26 DIAGNOSIS — R60.0 LOWER EXTREMITY EDEMA: ICD-10-CM

## 2022-04-26 RX ORDER — HYDROCHLOROTHIAZIDE 25 MG/1
25 TABLET ORAL DAILY
Qty: 30 TABLET | Refills: 0 | Status: SHIPPED | OUTPATIENT
Start: 2022-04-26 | End: 2022-05-25

## 2022-04-26 NOTE — TELEPHONE ENCOUNTER
Received request via: Pharmacy    Was the patient seen in the last year in this department? Yes    Does the patient have an active prescription (recently filled or refills available) for medication(s) requested? No  
No deformity or limitation of movement

## 2022-04-28 ENCOUNTER — HOSPITAL ENCOUNTER (OUTPATIENT)
Dept: RADIOLOGY | Facility: MEDICAL CENTER | Age: 47
End: 2022-04-28
Payer: COMMERCIAL

## 2022-05-02 ENCOUNTER — HOSPITAL ENCOUNTER (OUTPATIENT)
Dept: RADIOLOGY | Facility: MEDICAL CENTER | Age: 47
End: 2022-05-02
Attending: STUDENT IN AN ORGANIZED HEALTH CARE EDUCATION/TRAINING PROGRAM
Payer: COMMERCIAL

## 2022-05-02 DIAGNOSIS — Z12.31 ENCOUNTER FOR SCREENING MAMMOGRAM FOR BREAST CANCER: ICD-10-CM

## 2022-05-02 DIAGNOSIS — F33.2 SEVERE EPISODE OF RECURRENT MAJOR DEPRESSIVE DISORDER, WITHOUT PSYCHOTIC FEATURES (HCC): ICD-10-CM

## 2022-05-02 PROCEDURE — 77063 BREAST TOMOSYNTHESIS BI: CPT

## 2022-05-02 RX ORDER — BUPROPION HYDROCHLORIDE 150 MG/1
TABLET ORAL
Qty: 90 TABLET | Refills: 1 | Status: SHIPPED | OUTPATIENT
Start: 2022-05-02 | End: 2022-05-09

## 2022-05-07 ENCOUNTER — SLEEP STUDY (OUTPATIENT)
Dept: SLEEP MEDICINE | Facility: MEDICAL CENTER | Age: 47
End: 2022-05-07
Attending: STUDENT IN AN ORGANIZED HEALTH CARE EDUCATION/TRAINING PROGRAM
Payer: COMMERCIAL

## 2022-05-07 DIAGNOSIS — F51.04 CHRONIC INSOMNIA: ICD-10-CM

## 2022-05-07 DIAGNOSIS — G47.33 OSA (OBSTRUCTIVE SLEEP APNEA): ICD-10-CM

## 2022-05-07 DIAGNOSIS — Z86.73 HISTORY OF CVA (CEREBROVASCULAR ACCIDENT): ICD-10-CM

## 2022-05-07 PROCEDURE — 95811 POLYSOM 6/>YRS CPAP 4/> PARM: CPT | Performed by: STUDENT IN AN ORGANIZED HEALTH CARE EDUCATION/TRAINING PROGRAM

## 2022-05-09 ENCOUNTER — OFFICE VISIT (OUTPATIENT)
Dept: BEHAVIORAL HEALTH | Facility: CLINIC | Age: 47
End: 2022-05-09
Payer: COMMERCIAL

## 2022-05-09 DIAGNOSIS — U09.9 LONG COVID: ICD-10-CM

## 2022-05-09 DIAGNOSIS — Z79.899 ENCOUNTER FOR LONG-TERM (CURRENT) DRUG USE: ICD-10-CM

## 2022-05-09 DIAGNOSIS — F41.1 GENERALIZED ANXIETY DISORDER: ICD-10-CM

## 2022-05-09 DIAGNOSIS — F34.0 CYCLOTHYMIC DISORDER: ICD-10-CM

## 2022-05-09 DIAGNOSIS — F43.10 PTSD (POST-TRAUMATIC STRESS DISORDER): ICD-10-CM

## 2022-05-09 PROBLEM — F41.9 ANXIETY: Status: RESOLVED | Noted: 2021-12-10 | Resolved: 2022-05-09

## 2022-05-09 PROCEDURE — 99214 OFFICE O/P EST MOD 30 MIN: CPT | Mod: GC | Performed by: PSYCHIATRY & NEUROLOGY

## 2022-05-09 RX ORDER — ALPRAZOLAM 0.5 MG/1
0.5 TABLET ORAL NIGHTLY PRN
Qty: 15 TABLET | Refills: 0 | Status: SHIPPED | OUTPATIENT
Start: 2022-05-09 | End: 2022-06-01

## 2022-05-09 RX ORDER — ZOLPIDEM TARTRATE 5 MG/1
5 TABLET ORAL NIGHTLY PRN
Qty: 30 TABLET | Refills: 0 | Status: SHIPPED | OUTPATIENT
Start: 2022-05-09 | End: 2022-06-10

## 2022-05-09 RX ORDER — DUPILUMAB 300 MG/2ML
300 INJECTION, SOLUTION SUBCUTANEOUS
COMMUNITY
Start: 2022-05-04 | End: 2022-08-16

## 2022-05-09 RX ORDER — LITHIUM CARBONATE 300 MG
300 TABLET ORAL
Qty: 30 TABLET | Refills: 2 | Status: SHIPPED | OUTPATIENT
Start: 2022-05-09 | End: 2022-05-31

## 2022-05-09 NOTE — PROCEDURES
MONTAGE: Standard  STUDY TYPE: Split Night    RECORDING TECHNIQUE:   After the scalp was prepared, gold plated electrodes were applied to the scalp according to the International 10-20 System. EEG (electroencephalogram) was continuously monitored from the O1-M2, O2-M1, C3-M2, C4-M1, F3-M2, and F4-M1. EOGs (electrooculograms) were monitored by electrodes placed at the left and right outer canthi. Chin EMG (electromyogram) was monitored by electrodes placed on the mentalis and sub-mentalis muscles. Nasal and oral airflow were monitored using a triple port thermocouple as well as oronasal pressure transducer. Respiratory effort was measured by inductive plethysmography technology employing abdominal and thoracic belts. Blood oxygen saturation and pulse were monitored by pulse oximetry. Heart rhythm was monitored by surface electrocardiogram. Leg EMG was studied using surface electrodes placed on left and right anterior tibialis. A microphone was used to monitor tracheal sounds and snoring. Body position was monitored and documented by technician observation.   SCORING CRITERIA:   A modification of the AASM manual for scoring of sleep and associated events was used. Obstructive apneas were scored by cessation of airflow for at least 10 seconds with continuing respiratory effort. Central apneas were scored by cessation of airflow for at least 10 seconds with no respiratory effort. Hypopneas were scored by a 30% or more reduction in airflow for at least 10 seconds accompanied by arterial oxygen desaturation of 3% or an arousal. For CMS (Medicare) patients, per AASM rule 1B, hypopneas are scored by 30% with mild reduction in airflow for at least 10 seconds accompanied by arterial saturation decreased at 4%.    DIAGNOSTIC  Study start time was 09:19:49 PM. Diagnostic recording time was 270 minutes with a total sleep time of 221 minutes resulting in a sleep efficiency of 82.04%%. Sleep latency from the start of the study was  36 minutes and the latency from sleep to REM was 00 minutes. In total,36 arousals were scored for an arousal index of 9.8.  Respiratory:  There were a total of 2 apneas consisting of 2 obstructive apneas, 0 mixed apneas, and 0 central apneas. A total of 91 hypopneas were scored. The apnea index was 0.54 per hour and the hypopnea index was 24.65 per hour resulting in an overall AHI of 25.19. AHI during rem was 0.0 and AHI while supine was 59.01.  No REM sleep was seen during diagnostic.  Oximetry:  There was a mean oxygen saturation of 90.0%. The minimum oxygen saturation during NREM sleep was83.0% and in REM was --. Time spent during sleep with oxygen saturations <88% was 22.0 minutes.   Cardiac:  The highest heart rate seen while awake was 107 BPM while the highest heart rate during sleep was 98 BPM with an average sleeping heart rate of 80 BPM.  Limb Movements:  There were a total of 0 PLMs during sleep, which resulted in a PLM index of 0.0. There were 4 PLMs associated with arousals which resulted in a PLMS arousal index of 1.1.    TREATMENT:  Treatment recording time was 4h 48.0m (288 minutes) with a total sleep time of 4h 28.0m (268 minutes) resulting in a sleep efficiency of 93.1%. Sleep latency from the start of treatment was 05 minutes and REM latency from sleep onset was 1h 25.5m. The patient had 66 arousals in total for an arousal index of 14.8.  Respiratory:   There were 1 apneas in total consisting of 1 obstructive apneas, 0 central apneas, and 0 mixed apneas for an apnea index of 0.22. The patient had 48 hypopneas in total, which resulted in a hypopnea index of 10.75. The overall AHI was 10.97, with a REM AHI of 43.81, and a supine AHI of 18.93.  Supine REM sleep was seen on both CPAP and BiPAP.  Oximetry:  The mean SaO2 during treatment was 92.0%. The minimum oxygen saturation in NREM was86.0 % and in REM was 79.0%. Patient spent 13.5 minutes of TST with SaO2 <88%.  Cardiac:  The highest heart rate  during sleep was 94 BPM with an average sleeping heart rate of 76BPM.  Limb Movements:  There were a total of 0 PLMS during titration sleep time that resulted in an index of 0.0. There were 0 PLMS associated with arousals. This resulted in a PLM arousal index of 0.0.  Titration:   CPAP was tried from 5 to 12cm H2O. BiPAP was tried from 15/11 to 18/14cm H2O.  This was a fully attended sleep study. This test was technically adequate. This patient was titrated on CPAP starting at 5 cm of water pressure. Patient was titrated up to BiPAP 18/14 cm of water pressure. Patient did best at BiPAP 18/14 cm of water pressure. Patient spent 35 minutes minutes at that pressure and their AHI was 1.69 which is considered treated obstructive sleep apnea.     Impression:  1.  Moderate obstructive sleep apnea with overall AHI 25.19  2.  Sleep apnea worse in supine sleep  3.  Nocturnal hypoxia minimum oxygen saturation 83% time at or below 88% saturation 22 minutes  4.  Oxygenation improved with BiPAP therapy minimal oxygen saturation 90%      Recommendations:  I recommend auto BiPAP of EPAP min 12 IPAP max 18 pressure support 4 cm with F30i mask.     I also recommend 30 day compliance download to assess the efficacy to the recommended pressure, measure leak, apnea hypopnea index and compliance for further outpatient monitoring and management of PAP therapy. In some cases alternative treatment options may be proven effective in resolving sleep apnea. These options include upper airway surgery, the use of a dental orthotic, weight loss orpositional therapy. Clinical correlation is required. In general patients with sleep apnea are advised to avoid alcohol, sedatives and not to operate a motor vehicle while drowsy.  Untreated sleep apnea increases the risk for cardiovascular and neurovascular disease.

## 2022-05-09 NOTE — PATIENT INSTRUCTIONS
Current Outpatient Medications:   •  zolpidem (AMBIEN) 5 MG Tab, Take 1 Tablet by mouth at bedtime as needed for Sleep for up to 30 days., Disp: 30 Tablet, Rfl: 0  •  ALPRAZolam (XANAX) 0.5 MG Tab, Take 1 Tablet by mouth at bedtime as needed for Sleep for up to 15 doses., Disp: 15 Tablet, Rfl: 0  •  lithium carbonate, IR, 300 MG Tab tablet, Take 1 Tablet by mouth at bedtime., Disp: 30 Tablet, Rfl: 2  •  WIXELA INHUB 250-50 MCG/ACT AEROSOL POWDER, BREATH ACTIVATED, TAKE 1 PUFF BY MOUTH EVERY 12 HOURS, Disp: 60 Each, Rfl: 3  •  pregabalin (LYRICA) 200 MG capsule, TAKE 1 CAPSULE BY MOUTH IN THE MORNING AND 2 CAPSULES AT NIGHT, Disp: 270 Capsule, Rfl: 0  •  DUPIXENT 300 MG/2ML injection, , Disp: , Rfl:   •  hydroCHLOROthiazide (HYDRODIURIL) 25 MG Tab, TAKE 1 TABLET BY MOUTH EVERY DAY, Disp: 30 Tablet, Rfl: 0  •  atorvastatin (LIPITOR) 20 MG Tab, Take 1 Tablet by mouth every day., Disp: 90 Tablet, Rfl: 3  •  cyclobenzaprine (FLEXERIL) 10 mg Tab, Take 1 Tablet by mouth every day., Disp: 90 Tablet, Rfl: 1  •  promethazine (PHENERGAN) 25 MG Tab, Take 1 Tablet by mouth every 6 hours as needed for Nausea/Vomiting., Disp: 30 Tablet, Rfl: 0  •  ketoconazole (NIZORAL) 2 % Cream, Apply 1 Application topically every day., Disp: 30 g, Rfl: 1  •  calcipotriene (DOVONEX) 0.005 % Cream, , Disp: , Rfl:   •  Acetaminophen (TYLENOL PO), Take  by mouth., Disp: , Rfl:   •  levalbuterol (XOPENEX HFA) 45 MCG/ACT inhaler, Inhale 1-2 Puffs every four hours as needed for Shortness of Breath (asthma symptoms)., Disp: 30 g, Rfl: 3  •  celecoxib (CELEBREX) 200 MG Cap, Take 200 mg by mouth every day., Disp: , Rfl:   •  DULoxetine (CYMBALTA) 60 MG Cap DR Particles delayed-release capsule, Take 120 mg by mouth every day., Disp: , Rfl:   •  metaxalone (SKELAXIN) 800 MG Tab, Take 800 mg by mouth every 12 hours as needed., Disp: , Rfl:   •  propranolol CR (INDERAL LA) 80 MG CAPSULE SR 24 HR, Take 80 mg by mouth every day., Disp: , Rfl:   •  omeprazole  (PRILOSEC) 40 MG delayed-release capsule, Take 40 mg by mouth every day., Disp: , Rfl:   •  ergocalciferol (DRISDOL) 21679 UNIT capsule, Take 1 Cap by mouth. Take 2 a week for one month, then 1 weekly. Refills for 4 at a time, Disp: 8 Cap, Rfl: 11    RTC in 2 weeks with Dr. Purcell  Referred for psychotherapy
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
## 2022-05-18 ENCOUNTER — HOSPITAL ENCOUNTER (OUTPATIENT)
Dept: LAB | Facility: MEDICAL CENTER | Age: 47
End: 2022-05-18
Attending: STUDENT IN AN ORGANIZED HEALTH CARE EDUCATION/TRAINING PROGRAM
Payer: COMMERCIAL

## 2022-05-18 ENCOUNTER — HOSPITAL ENCOUNTER (OUTPATIENT)
Dept: LAB | Facility: MEDICAL CENTER | Age: 47
End: 2022-05-18
Attending: PSYCHIATRY & NEUROLOGY
Payer: COMMERCIAL

## 2022-05-18 DIAGNOSIS — R93.0 ABNORMAL MRI OF HEAD: ICD-10-CM

## 2022-05-18 DIAGNOSIS — Z79.899 ENCOUNTER FOR LONG-TERM (CURRENT) DRUG USE: ICD-10-CM

## 2022-05-18 DIAGNOSIS — R26.9 GAIT DISTURBANCE: ICD-10-CM

## 2022-05-18 LAB
ANION GAP SERPL CALC-SCNC: 10 MMOL/L (ref 7–16)
BUN SERPL-MCNC: 18 MG/DL (ref 8–22)
CALCIUM SERPL-MCNC: 9.6 MG/DL (ref 8.5–10.5)
CHLORIDE SERPL-SCNC: 99 MMOL/L (ref 96–112)
CO2 SERPL-SCNC: 30 MMOL/L (ref 20–33)
CREAT SERPL-MCNC: 0.98 MG/DL (ref 0.5–1.4)
EST. AVERAGE GLUCOSE BLD GHB EST-MCNC: 105 MG/DL
FOLATE SERPL-MCNC: 24.8 NG/ML
GFR SERPLBLD CREATININE-BSD FMLA CKD-EPI: 72 ML/MIN/1.73 M 2
GLUCOSE SERPL-MCNC: 90 MG/DL (ref 65–99)
HBA1C MFR BLD: 5.3 % (ref 4–5.6)
LITHIUM SERPL-MCNC: 0.4 MMOL/L (ref 0.6–1.2)
POTASSIUM SERPL-SCNC: 4.4 MMOL/L (ref 3.6–5.5)
SODIUM SERPL-SCNC: 139 MMOL/L (ref 135–145)
T PALLIDUM AB SER QL IA: NORMAL
T4 FREE SERPL-MCNC: 0.79 NG/DL (ref 0.93–1.7)
TSH SERPL DL<=0.005 MIU/L-ACNC: 4.84 UIU/ML (ref 0.38–5.33)
VIT B12 SERPL-MCNC: 667 PG/ML (ref 211–911)

## 2022-05-18 PROCEDURE — 84207 ASSAY OF VITAMIN B-6: CPT

## 2022-05-18 PROCEDURE — 84425 ASSAY OF VITAMIN B-1: CPT

## 2022-05-18 PROCEDURE — 86780 TREPONEMA PALLIDUM: CPT

## 2022-05-18 PROCEDURE — 87389 HIV-1 AG W/HIV-1&-2 AB AG IA: CPT

## 2022-05-18 PROCEDURE — 84443 ASSAY THYROID STIM HORMONE: CPT

## 2022-05-18 PROCEDURE — 86334 IMMUNOFIX E-PHORESIS SERUM: CPT

## 2022-05-18 PROCEDURE — 84630 ASSAY OF ZINC: CPT

## 2022-05-18 PROCEDURE — 80178 ASSAY OF LITHIUM: CPT

## 2022-05-18 PROCEDURE — 84165 PROTEIN E-PHORESIS SERUM: CPT

## 2022-05-18 PROCEDURE — 36415 COLL VENOUS BLD VENIPUNCTURE: CPT

## 2022-05-18 PROCEDURE — 82525 ASSAY OF COPPER: CPT

## 2022-05-18 PROCEDURE — 82607 VITAMIN B-12: CPT

## 2022-05-18 PROCEDURE — 80048 BASIC METABOLIC PNL TOTAL CA: CPT

## 2022-05-18 PROCEDURE — 82746 ASSAY OF FOLIC ACID SERUM: CPT

## 2022-05-18 PROCEDURE — 84155 ASSAY OF PROTEIN SERUM: CPT

## 2022-05-18 PROCEDURE — 83036 HEMOGLOBIN GLYCOSYLATED A1C: CPT

## 2022-05-18 PROCEDURE — 84439 ASSAY OF FREE THYROXINE: CPT

## 2022-05-19 ENCOUNTER — OFFICE VISIT (OUTPATIENT)
Dept: SLEEP MEDICINE | Facility: MEDICAL CENTER | Age: 47
End: 2022-05-19
Payer: COMMERCIAL

## 2022-05-19 VITALS
HEART RATE: 68 BPM | DIASTOLIC BLOOD PRESSURE: 72 MMHG | WEIGHT: 236.7 LBS | SYSTOLIC BLOOD PRESSURE: 112 MMHG | HEIGHT: 68 IN | OXYGEN SATURATION: 97 % | BODY MASS INDEX: 35.87 KG/M2

## 2022-05-19 DIAGNOSIS — J98.6 ELEVATED DIAPHRAGM: ICD-10-CM

## 2022-05-19 DIAGNOSIS — J98.4 RESTRICTIVE LUNG DISEASE: ICD-10-CM

## 2022-05-19 DIAGNOSIS — M06.09 RHEUMATOID ARTHRITIS WITHOUT RHEUMATOID FACTOR, MULTIPLE SITES (HCC): ICD-10-CM

## 2022-05-19 DIAGNOSIS — Z86.16 HISTORY OF COVID-19: ICD-10-CM

## 2022-05-19 DIAGNOSIS — J45.40 MODERATE PERSISTENT ASTHMA WITHOUT COMPLICATION: ICD-10-CM

## 2022-05-19 DIAGNOSIS — R90.89 ABNORMAL BRAIN MRI: ICD-10-CM

## 2022-05-19 LAB — HIV 1+2 AB+HIV1 P24 AG SERPL QL IA: NORMAL

## 2022-05-19 PROCEDURE — 99204 OFFICE O/P NEW MOD 45 MIN: CPT | Mod: 25 | Performed by: INTERNAL MEDICINE

## 2022-05-19 ASSESSMENT — ENCOUNTER SYMPTOMS
EYE REDNESS: 0
TREMORS: 0
DIAPHORESIS: 0
FOCAL WEAKNESS: 0
BLURRED VISION: 0
PND: 0
ABDOMINAL PAIN: 0
VOMITING: 0
SHORTNESS OF BREATH: 1
HEADACHES: 0
HEARTBURN: 0
DEPRESSION: 0
WEAKNESS: 0
CHILLS: 0
WEIGHT LOSS: 0
NECK PAIN: 0
HEMOPTYSIS: 0
ORTHOPNEA: 1
SPEECH CHANGE: 0
NAUSEA: 0
WHEEZING: 0
CLAUDICATION: 0
SINUS PAIN: 0
CONSTIPATION: 0
STRIDOR: 0
SPUTUM PRODUCTION: 0
EYE DISCHARGE: 0
FEVER: 0
DIARRHEA: 0
DOUBLE VISION: 0
PHOTOPHOBIA: 0
FALLS: 0
PALPITATIONS: 0
MYALGIAS: 0
BACK PAIN: 0
COUGH: 0
EYE PAIN: 0
SORE THROAT: 0
DIZZINESS: 0

## 2022-05-19 ASSESSMENT — FIBROSIS 4 INDEX: FIB4 SCORE: 1.41

## 2022-05-19 NOTE — PROGRESS NOTES
Chief Complaint   Patient presents with   • Establish Care     Ref by Dr. DORCAS Jean-Baptiste   • Asthma     CXR 12/10/21       HPI: This patient is a 46 y.o. female presenting for evaluation of asthma and shortness of breath.  Patient past medical history includes asthma for which she was followed by allergy but never formally followed by pulmonary only recently started on inhaled corticosteroids with Wixela 250 and short acting bronchodilators, eczema for which she was recently started on Dupixent by dermatology, rheumatoid arthritis previously followed by Dr. Olivo but not on any disease modifying antirheumatic agents.  Her more recent history is more complicated following acute respiratory illness with associated GI symptoms in October 2021 during a trip to [place].  She developed shortness of breath, cough which was productive of green mucus, fever and diarrhea.  Symptoms lasted for 10 days and chest x-ray done at [facility] upon return in October did show bilateral interstitial opacities.  No CT chest was ordered.  Acute symptoms resolved but since that time she has developed weakness and stroke like symptoms for which MRI of the brain was ordered and showed abnormality in the cerebellar vermis which was not present on MRI from 2011.  MRA has been ordered by neurology to better clarify this abnormality.  Patient also reports brain fog and orthopnea which is new.  She recently had a sleep study which showed AHI 26 mainly hypopneic events and mild hypoxia with basal SPO2 of 90% and a total 22 minutes below a saturation of 88% out of 270 minutes of study.  She was titrated on BiPAP and has follow-up with sleep medicine on May 23.  She reports a 60 pound weight gain in the last year and pulmonary function testing from March showed reduced FVC at 2.33 L or 59% predicted and FEV1 at 1.64 L or 51% predicted.  FEV1/FVC ratio was borderline at 70 and she did have concavity to the expiratory flow volume loop  consistent with airflow obstruction.  No bronchodilator response.  Total lung capacity was low normal at 83% predicted and there is borderline air trapping with residual volume of 121% predicted.  DLCO is normal at 99% predicted and does overcorrect to 142% predicted for alveolar volume.  Most recent chest x-ray from December appears to show clearance of bibasilar infiltrates seen at [place] in October although I cannot compare the films directly but she does have an elevated left hemidiaphragm.    Past Medical History:   Diagnosis Date   • Arthralgia of multiple joints 3/8/2011   • Arthritis    • ASTHMA 1/20/2010   • Elevated antinuclear antibody (JOSE C) level 3/8/2011   • Personal history of hydronephrosis    • Rhinitis, allergic 1/20/2010   • Vitamin d deficiency 3/8/2011       Social History     Socioeconomic History   • Marital status:      Spouse name: Not on file   • Number of children: 2   • Years of education: Not on file   • Highest education level: Not on file   Occupational History   • Not on file   Tobacco Use   • Smoking status: Never Smoker   • Smokeless tobacco: Never Used   Vaping Use   • Vaping Use: Never used   Substance and Sexual Activity   • Alcohol use: Yes     Comment: 1 glass of wine on occasion   • Drug use: Never   • Sexual activity: Yes     Partners: Male     Birth control/protection: Female Sterilization   Other Topics Concern   • Not on file   Social History Narrative    3 foster children currently.     Social Determinants of Health     Financial Resource Strain: Not on file   Food Insecurity: Not on file   Transportation Needs: Not on file   Physical Activity: Not on file   Stress: Not on file   Social Connections: Not on file   Intimate Partner Violence: Not on file   Housing Stability: Not on file       Family History   Problem Relation Age of Onset   • Arthritis Mother    • Heart Disease Mother    • Colon Cancer Father         70s   • Alcohol abuse Maternal Grandmother    •  Suicide Attempts Maternal Grandmother          by suicide   • Bipolar disorder Maternal Grandmother    • Alcohol abuse Maternal Grandfather    • Brain Cancer Maternal Grandfather    • Alcohol abuse Paternal Grandmother    • Breast Cancer Paternal Grandmother          of breast CA   • Alcohol abuse Paternal Grandfather    • Heart Disease Paternal Grandfather    • Other Daughter         parotid tumor, vertigo   • Brain Cancer Son        Current Outpatient Medications on File Prior to Visit   Medication Sig Dispense Refill   • DUPIXENT 300 MG/2ML injection      • zolpidem (AMBIEN) 5 MG Tab Take 1 Tablet by mouth at bedtime as needed for Sleep for up to 30 days. 30 Tablet 0   • ALPRAZolam (XANAX) 0.5 MG Tab Take 1 Tablet by mouth at bedtime as needed for Sleep for up to 15 doses. 15 Tablet 0   • lithium carbonate, IR, 300 MG Tab tablet Take 1 Tablet by mouth at bedtime. 30 Tablet 2   • WIXELA INHUB 250-50 MCG/ACT AEROSOL POWDER, BREATH ACTIVATED TAKE 1 PUFF BY MOUTH EVERY 12 HOURS 60 Each 3   • pregabalin (LYRICA) 200 MG capsule TAKE 1 CAPSULE BY MOUTH IN THE MORNING AND 2 CAPSULES AT NIGHT 270 Capsule 0   • hydroCHLOROthiazide (HYDRODIURIL) 25 MG Tab TAKE 1 TABLET BY MOUTH EVERY DAY 30 Tablet 0   • atorvastatin (LIPITOR) 20 MG Tab Take 1 Tablet by mouth every day. 90 Tablet 3   • cyclobenzaprine (FLEXERIL) 10 mg Tab Take 1 Tablet by mouth every day. 90 Tablet 1   • promethazine (PHENERGAN) 25 MG Tab Take 1 Tablet by mouth every 6 hours as needed for Nausea/Vomiting. 30 Tablet 0   • ketoconazole (NIZORAL) 2 % Cream Apply 1 Application topically every day. 30 g 1   • calcipotriene (DOVONEX) 0.005 % Cream      • Acetaminophen (TYLENOL PO) Take  by mouth.     • levalbuterol (XOPENEX HFA) 45 MCG/ACT inhaler Inhale 1-2 Puffs every four hours as needed for Shortness of Breath (asthma symptoms). 30 g 3   • celecoxib (CELEBREX) 200 MG Cap Take 200 mg by mouth every day.     • DULoxetine (CYMBALTA) 60 MG Cap DR Particles  "delayed-release capsule Take 120 mg by mouth every day.     • metaxalone (SKELAXIN) 800 MG Tab Take 800 mg by mouth every 12 hours as needed.     • propranolol CR (INDERAL LA) 80 MG CAPSULE SR 24 HR Take 80 mg by mouth every day.     • omeprazole (PRILOSEC) 40 MG delayed-release capsule Take 40 mg by mouth every day.     • ergocalciferol (DRISDOL) 20675 UNIT capsule Take 1 Cap by mouth. Take 2 a week for one month, then 1 weekly. Refills for 4 at a time 8 Cap 11     No current facility-administered medications on file prior to visit.       Allergies: Biaxin [clarithromycin] and Latex    ROS:   Review of Systems   Constitutional: Negative for chills, diaphoresis, fever, malaise/fatigue and weight loss.   HENT: Negative for congestion, ear discharge, ear pain, hearing loss, nosebleeds, sinus pain, sore throat and tinnitus.    Eyes: Negative for blurred vision, double vision, photophobia, pain, discharge and redness.   Respiratory: Positive for shortness of breath. Negative for cough, hemoptysis, sputum production, wheezing and stridor.    Cardiovascular: Positive for orthopnea. Negative for chest pain, palpitations, claudication, leg swelling and PND.   Gastrointestinal: Negative for abdominal pain, constipation, diarrhea, heartburn, nausea and vomiting.   Genitourinary: Negative for dysuria and urgency.   Musculoskeletal: Negative for back pain, falls, joint pain, myalgias and neck pain.   Skin: Negative for itching and rash.   Neurological: Negative for dizziness, tremors, speech change, focal weakness, weakness and headaches.   Endo/Heme/Allergies: Negative for environmental allergies.   Psychiatric/Behavioral: Negative for depression.       /72 (BP Location: Right arm, Patient Position: Sitting, BP Cuff Size: Adult long)   Pulse 68   Ht 1.727 m (5' 8\")   Wt 107 kg (236 lb 11.2 oz)   SpO2 97%     Physical Exam:  Physical Exam  Vitals reviewed.   Constitutional:       General: She is not in acute " distress.     Appearance: Normal appearance. She is well-developed. She is obese.   HENT:      Head: Normocephalic and atraumatic.      Right Ear: External ear normal.      Left Ear: External ear normal.      Nose: Nose normal. No congestion.      Mouth/Throat:      Mouth: Mucous membranes are moist.      Pharynx: No oropharyngeal exudate.   Eyes:      General: No scleral icterus.     Conjunctiva/sclera: Conjunctivae normal.      Pupils: Pupils are equal, round, and reactive to light.   Neck:      Vascular: No JVD.      Trachea: No tracheal deviation.   Cardiovascular:      Rate and Rhythm: Normal rate and regular rhythm.      Heart sounds: Normal heart sounds. No murmur heard.    No friction rub. No gallop.   Pulmonary:      Effort: Pulmonary effort is normal. No accessory muscle usage or respiratory distress.      Breath sounds: No wheezing or rales.      Comments: Decreased bs b/l at bases  Abdominal:      General: There is no distension.      Palpations: Abdomen is soft.      Tenderness: There is no abdominal tenderness.   Musculoskeletal:         General: No tenderness or deformity. Normal range of motion.      Cervical back: Normal range of motion and neck supple.      Right lower leg: No edema.      Left lower leg: No edema.   Lymphadenopathy:      Cervical: No cervical adenopathy.   Skin:     General: Skin is warm and dry.      Findings: No rash.      Nails: There is no clubbing.   Neurological:      Mental Status: She is alert and oriented to person, place, and time.      Cranial Nerves: No cranial nerve deficit.      Gait: Gait normal.   Psychiatric:         Mood and Affect: Mood normal.         Behavior: Behavior normal.         PFTs as reviewed by me personally: as per hPI    Imaging as reviewed by me personally: as per hPI    Assessment:  1. Moderate persistent asthma without complication     2. Restrictive lung disease  CT-CHEST (THORAX) W/O    Multiple Oximetry   3. Elevated diaphragm  DX SNIFF TEST    4. Abnormal brain MRI     5. Rheumatoid arthritis without rheumatoid factor, multiple sites (HCC)     6. History of COVID-19         Plan:  1.  This is chronic however new to me, but not clear that she has had a history of exacerbations requiring prednisone.  She does feel symptoms have improved since being started on inhaled corticosteroids with Wixela 250 which we will continue and short acting bronchodilators as needed.  She does have mainly airflow obstruction but also mild restrictive process.  She is also been started on biologic therapy which may help with asthma symptoms.  2.  Patient has mixed restrictive obstructive pattern which may be due to effort however she does have underlying connective tissue disease and questionably suffered from COVID in October now with orthopnea and elevated left hemidiaphragm.  For the above reasons I would like to order CT chest and sniff test.  3.  See above.  We can see or have seen unilateral phrenic nerve paresis following acute viral illness.  4.  Patient has plans to follow-up with neurology and MRA.  5.  Per patient followed by Dr. Olivo.  No history of pulmonary involvement.  We will obtain records from Dr. Olivo.  CT chest as per above.  6.  This is presumed based on the symptoms at the time of her acute illness however patient was not confirmed by testing.  She now has sequelae that could possibly be explained by COVID.  We will consider pulmonary rehab referral at follow-up.  Return in about 4 months (around 9/19/2022) for ct chest. sniff test .

## 2022-05-19 NOTE — PROCEDURES
Multi-Ox Readings  Multi Ox #1 Room air   O2 sat % at rest 95   O2 sat % on exertion 90   O2 sat average on exertion     Multi Ox #2     O2 sat % at rest     O2 sat % on exertion     O2 sat average on exertion       Oxygen Use     Oxygen Frequency     Duration of need     Is the patient mobile within the home?     CPAP Use?     BIPAP Use?     Servo Titration

## 2022-05-20 ENCOUNTER — OFFICE VISIT (OUTPATIENT)
Dept: BEHAVIORAL HEALTH | Facility: CLINIC | Age: 47
End: 2022-05-20
Payer: COMMERCIAL

## 2022-05-20 DIAGNOSIS — F34.0 CYCLOTHYMIC DISORDER: ICD-10-CM

## 2022-05-20 DIAGNOSIS — U09.9 LONG COVID: ICD-10-CM

## 2022-05-20 DIAGNOSIS — F41.1 GENERALIZED ANXIETY DISORDER: ICD-10-CM

## 2022-05-20 DIAGNOSIS — F43.10 PTSD (POST-TRAUMATIC STRESS DISORDER): ICD-10-CM

## 2022-05-20 LAB
COPPER SERPL-MCNC: 167.1 UG/DL (ref 80–155)
ZINC SERPL-MCNC: 88.1 UG/DL (ref 60–120)

## 2022-05-20 PROCEDURE — 99214 OFFICE O/P EST MOD 30 MIN: CPT | Mod: 95 | Performed by: PSYCHIATRY & NEUROLOGY
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
## 2022-05-21 LAB
ALBUMIN SERPL ELPH-MCNC: 3.74 G/DL (ref 3.75–5.01)
ALPHA1 GLOB SERPL ELPH-MCNC: 0.38 G/DL (ref 0.19–0.46)
ALPHA2 GLOB SERPL ELPH-MCNC: 0.88 G/DL (ref 0.48–1.05)
B-GLOBULIN SERPL ELPH-MCNC: 0.88 G/DL (ref 0.48–1.1)
GAMMA GLOB SERPL ELPH-MCNC: 1.13 G/DL (ref 0.62–1.51)
INTERPRETATION SERPL IFE-IMP: ABNORMAL
INTERPRETATION SERPL IFE-IMP: ABNORMAL
PATHOLOGY STUDY: ABNORMAL
PROT SERPL-MCNC: 7 G/DL (ref 6.3–8.2)
VIT B6 SERPL-MCNC: 197 NMOL/L (ref 20–125)

## 2022-05-23 ENCOUNTER — OFFICE VISIT (OUTPATIENT)
Dept: SLEEP MEDICINE | Facility: MEDICAL CENTER | Age: 47
End: 2022-05-23
Payer: COMMERCIAL

## 2022-05-23 VITALS
DIASTOLIC BLOOD PRESSURE: 70 MMHG | WEIGHT: 263 LBS | BODY MASS INDEX: 39.86 KG/M2 | SYSTOLIC BLOOD PRESSURE: 128 MMHG | OXYGEN SATURATION: 95 % | HEIGHT: 68 IN | HEART RATE: 97 BPM

## 2022-05-23 DIAGNOSIS — F51.04 CHRONIC INSOMNIA: ICD-10-CM

## 2022-05-23 DIAGNOSIS — J98.6 ELEVATED DIAPHRAGM: ICD-10-CM

## 2022-05-23 DIAGNOSIS — R26.9 GAIT ABNORMALITY: ICD-10-CM

## 2022-05-23 DIAGNOSIS — J45.40 MODERATE PERSISTENT ASTHMA WITHOUT COMPLICATION: ICD-10-CM

## 2022-05-23 DIAGNOSIS — R06.09 DYSPNEA ON EXERTION: ICD-10-CM

## 2022-05-23 DIAGNOSIS — G47.33 OSA (OBSTRUCTIVE SLEEP APNEA): ICD-10-CM

## 2022-05-23 DIAGNOSIS — Z86.73 HISTORY OF CVA (CEREBROVASCULAR ACCIDENT): ICD-10-CM

## 2022-05-23 LAB — VIT B1 BLD-MCNC: 218 NMOL/L (ref 70–180)

## 2022-05-23 PROCEDURE — 99213 OFFICE O/P EST LOW 20 MIN: CPT | Performed by: NURSE PRACTITIONER

## 2022-05-23 ASSESSMENT — FIBROSIS 4 INDEX: FIB4 SCORE: 1.41

## 2022-05-23 NOTE — PROGRESS NOTES
Chief Complaint   Patient presents with   • Apnea     SHARLENE-Last seen 02/17/2022   • Results     Sleep Study- 05/07/2022       HPI:      Mrs. Rodriguez is a 47 y/o female patient who is in today for SHARLENE f/u and sleep study results. 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 PMH includes dyslipidemia, cyclothymic disorder, PTSD, anxiety, depression, IBS, RA, chronic pain syndrome, ADHD, insomnia, SHARLENE, asthma, allergic rhinitis, obesity, never smoker, tonsillectomy, GERD, Covid 19, CVA in November 2021?, but seen on MRI in 1/26/22, RLS.    Patient is also followed by the pulmonary clinic for asthma.  Please refer to Dr. Douglas's office note from 5/19/2022 for further details.    She goes to bed at 10 pm and wakes up at 6 am.  She does struggle with staying asleep due to worrying about things as well as hot flashes, pain and nocturia.  She is taking Ambien 5 mg at night to help with sleep which is prescribed by her PCP.  She reports morning headache and snoring.  Patient does report feeling tired during the day and at least twice has had to pull over due to the feeling of wanting to fall asleep.  Her  had to come pick her up and drive her home.  She tries to stay active by walking daily but does get short of breath with activity which she attributes to history of COVID-19. She also follows up with neurology for history of CVA presumed in November 2021 but it is not certain.       Sleep/Pulm Study History:   Multi Ox from 5/19/22 indicated SPO2 on room air at rest was 95% and with exertion on room air SPO2 was 90%.    PSG split night study from 5/7/22 indicated moderate obstructive sleep apnea with overall AHI 25.19. Sleep apnea worse in supine sleep. Nocturnal hypoxia minimum oxygen saturation 83% time at or below 88% saturation 22 minutes. Oxygenation improved with BiPAP therapy minimal oxygen saturation 90%. CPAP was tried from 5 to 12cm H2O. BiPAP was tried from 15/11 to 18/14cm H2O. This was a fully attended sleep  study. This test was technically adequate. This patient was titrated on CPAP starting at 5 cm of water pressure. Patient was titrated up to BiPAP 18/14 cm of water pressure. Patient did best at BiPAP 18/14 cm of water pressure. Patient spent 35 minutes minutes at that pressure and their AHI was 1.69 which is considered treated obstructive sleep apnea. Recommend auto BiPAP of EPAP min 12 IPAP max 18 pressure support 4 cm with F30i mask.     PFT from 3/12/22 indicated reduced FVC at 2.33 L or 59% predicted and FEV1 at 1.64 L or 51% predicted.  FEV1/FVC ratio was borderline at 70 and she did have concavity to the expiratory flow volume loop consistent with airflow obstruction.  No bronchodilator response.  Total lung capacity was low normal at 83% predicted and there is borderline air trapping with residual volume of 121% predicted. DLCO is normal at 99% predicted and does overcorrect to 142% predicted for alveolar volume.     Chest x-ray from 12/10/21 appears to show clearance of bibasilar infiltrates seen at [facility] in October although I cannot compare the films directly but she does have an elevated left hemidiaphragm.    ROS:    Constitutional: Denies fevers, Denies weight changes  Eyes: Denies changes in vision, no eye pain  Ears/Nose/Throat/Mouth: Denies nasal congestion or sore throat   Cardiovascular: Denies chest pain or palpitations   Respiratory: Denies shortness of breath , Denies cough  Gastrointestinal/Hepatic: Denies abdominal pain, nausea, vomiting,   Skin/Breast: Denies rash,   Neurological: Denies headache, confusion,   Psychiatric: denies mood disorder   Sleep: denies snoring       Past Medical History:   Diagnosis Date   • Arthralgia of multiple joints 3/8/2011   • Arthritis    • ASTHMA 1/20/2010   • Elevated antinuclear antibody (JOSE C) level 3/8/2011   • Personal history of hydronephrosis    • Rhinitis, allergic 1/20/2010   • Vitamin d deficiency 3/8/2011       Past Surgical History:   Procedure  Laterality Date   • ABDOMINAL HYSTERECTOMY TOTAL      endometriosis and bleeding   • OOPHORECTOMY Bilateral    • SALPINGECTOMY Bilateral    • ABDOMINAL EXPLORATION      endometriosis   • CHOLECYSTECTOMY     • TONSILLECTOMY         Family History   Problem Relation Age of Onset   • Arthritis Mother    • Heart Disease Mother    • Colon Cancer Father         70s   • Alcohol abuse Maternal Grandmother    • Suicide Attempts Maternal Grandmother          by suicide   • Bipolar disorder Maternal Grandmother    • Alcohol abuse Maternal Grandfather    • Brain Cancer Maternal Grandfather    • Alcohol abuse Paternal Grandmother    • Breast Cancer Paternal Grandmother          of breast CA   • Alcohol abuse Paternal Grandfather    • Heart Disease Paternal Grandfather    • Other Daughter         parotid tumor, vertigo   • Brain Cancer Son        Social History     Socioeconomic History   • Marital status:      Spouse name: Not on file   • Number of children: 2   • Years of education: Not on file   • Highest education level: Not on file   Occupational History   • Not on file   Tobacco Use   • Smoking status: Never Smoker   • Smokeless tobacco: Never Used   Vaping Use   • Vaping Use: Never used   Substance and Sexual Activity   • Alcohol use: Yes     Comment: 1 glass of wine on occasion   • Drug use: Never   • Sexual activity: Yes     Partners: Male     Birth control/protection: Female Sterilization   Other Topics Concern   • Not on file   Social History Narrative    3 foster children currently.     Social Determinants of Health     Financial Resource Strain: Not on file   Food Insecurity: Not on file   Transportation Needs: Not on file   Physical Activity: Not on file   Stress: Not on file   Social Connections: Not on file   Intimate Partner Violence: Not on file   Housing Stability: Not on file       Allergies as of 2022 - Reviewed 2022   Allergen Reaction Noted   • Biaxin [clarithromycin]  Vomiting 05/06/2009   • Latex  07/28/2011        Vitals:  Vitals:    05/23/22 1411   BP: 128/70   Pulse: (!) 102   SpO2: 95%       Current medications as of today   Current Outpatient Medications   Medication Sig Dispense Refill   • DUPIXENT 300 MG/2ML injection      • zolpidem (AMBIEN) 5 MG Tab Take 1 Tablet by mouth at bedtime as needed for Sleep for up to 30 days. 30 Tablet 0   • ALPRAZolam (XANAX) 0.5 MG Tab Take 1 Tablet by mouth at bedtime as needed for Sleep for up to 15 doses. 15 Tablet 0   • lithium carbonate, IR, 300 MG Tab tablet Take 1 Tablet by mouth at bedtime. 30 Tablet 2   • WIXELA INHUB 250-50 MCG/ACT AEROSOL POWDER, BREATH ACTIVATED TAKE 1 PUFF BY MOUTH EVERY 12 HOURS 60 Each 3   • pregabalin (LYRICA) 200 MG capsule TAKE 1 CAPSULE BY MOUTH IN THE MORNING AND 2 CAPSULES AT NIGHT 270 Capsule 0   • hydroCHLOROthiazide (HYDRODIURIL) 25 MG Tab TAKE 1 TABLET BY MOUTH EVERY DAY 30 Tablet 0   • atorvastatin (LIPITOR) 20 MG Tab Take 1 Tablet by mouth every day. 90 Tablet 3   • cyclobenzaprine (FLEXERIL) 10 mg Tab Take 1 Tablet by mouth every day. 90 Tablet 1   • promethazine (PHENERGAN) 25 MG Tab Take 1 Tablet by mouth every 6 hours as needed for Nausea/Vomiting. 30 Tablet 0   • ketoconazole (NIZORAL) 2 % Cream Apply 1 Application topically every day. 30 g 1   • calcipotriene (DOVONEX) 0.005 % Cream      • Acetaminophen (TYLENOL PO) Take  by mouth.     • levalbuterol (XOPENEX HFA) 45 MCG/ACT inhaler Inhale 1-2 Puffs every four hours as needed for Shortness of Breath (asthma symptoms). 30 g 3   • celecoxib (CELEBREX) 200 MG Cap Take 200 mg by mouth every day.     • DULoxetine (CYMBALTA) 60 MG Cap DR Particles delayed-release capsule Take 120 mg by mouth every day.     • metaxalone (SKELAXIN) 800 MG Tab Take 800 mg by mouth every 12 hours as needed.     • propranolol CR (INDERAL LA) 80 MG CAPSULE SR 24 HR Take 80 mg by mouth every day.     • omeprazole (PRILOSEC) 40 MG delayed-release capsule Take 40 mg by  mouth every day.     • ergocalciferol (DRISDOL) 70216 UNIT capsule Take 1 Cap by mouth. Take 2 a week for one month, then 1 weekly. Refills for 4 at a time 8 Cap 11     No current facility-administered medications for this visit.         Physical Exam: Limited by COVID-19 precautions.  Appearance: Well developed, well nourished, no acute distress  Eyes: PERRL, EOM intact, sclera white, conjunctiva moist  Ears: no lesions or deformities  Hearing: grossly intact  Nose: no lesions or deformities  Respiratory effort: no intercostal retractions or use of accessory muscles  Extremities: no cyanosis or edema  Abdomen: soft   Gait and Station: normal  Digits and nails: no clubbing, cyanosis, petechiae or nodes.  Cranial nerves: grossly intact  Skin: no visible rashes, lesions or ulcers noted  Orientation: Oriented to time, person and place  Mood and affect: mood and affect appropriate, normal interaction with examiner  Judgement: Intact    Assessment:  1. SHARLENE (obstructive sleep apnea)  DME BIPAP   2. Chronic insomnia     3. Moderate persistent asthma without complication     4. Dyspnea on exertion     5. Elevated diaphragm     6. BMI 39.0-39.9,adult  Patient identified as having weight management issue.  Appropriate orders and counseling given.    Referral to Nutrition Services   7. History of CVA (cerebrovascular accident)     8. Gait abnormality           Plan  Discussed the cardiovascular and neuropsychiatric risks of untreated SHARLENE; including but not limited to: HTN, DM, MI, ASCVD, CVA, CHF, traffic accidents.     1. PSG split night study from 5/7/22 indicated moderate obstructive sleep apnea with overall AHI 25.19. Sleep apnea worse in supine sleep. Nocturnal hypoxia minimum oxygen saturation 83% time at or below 88% saturation 22 minutes. Oxygenation improved with BiPAP therapy minimal oxygen saturation 90%. CPAP was tried from 5 to 12cm H2O. BiPAP was tried from 15/11 to 18/14cm H2O. This was a fully attended sleep  study. This test was technically adequate. This patient was titrated on CPAP starting at 5 cm of water pressure. Patient was titrated up to BiPAP 18/14 cm of water pressure. Patient did best at BiPAP 18/14 cm of water pressure. Patient spent 35 minutes minutes at that pressure and their AHI was 1.69 which is considered treated obstructive sleep apnea.   Recommendation:  Recommend auto BiPAP of EPAP min 12 IPAP max 18 pressure support 4 cm with F30i mask.  In some cases alternative treatment options may be proven effective in resolving sleep apnea. These options include upper airway surgery, the use of a dental orthotic, inspire implant, weight loss or positional therapy.  These were reviewed with the patient today.  Patient is also advised to avoid the supine position, alcohol 4 hours prior to bedtime, sedatives and opioids as all can exacerbate apnea.     *Patient is amenable to BiPAP therapy.  DME order ([facility]) for ResMed autoBiPAP IPAP/EPAP 18/12 cmH2O, PS 4 cmH2O, mask (medium Airfit F30i or MOC) and supplies was placed today. Clean mask and supplies weekly with soap and water, and change supplies per insurance guidelines. Advised patient to use the CPAP every night for more than four hours for optimal health benefit and to meet the health insurance 70% compliance guideline. Advised patient she may change the mask out if needed within the first 30 days after she receives her equipment.     *Consider OPO on BiPAP in the near future.     Patient was informed of PlayWith RespirGrabbed recall and that this may cause a delay up to 5 or 6 months in obtaining a ResMed machine.    2.  Sleep hygiene discussed. Recommend keeping a set sleep/wake schedule. Logging enough hours of sleep. Limiting/Avoiding naps. No caffeine after noon and no heavy meals in the evening.   Chronic insomnia: Continue to monitor.  Currently taking Ambien 5 mg nightly as needed insomnia which is prescribed by PCP.  May consider referral to   [facility]BT-[facility] in the future if needed.  3-5. Continue to f/u with pulmonary clinic.   6. Encouraged a healthy diet and exercise to help with weight loss and management.  Referral to nutritional services was placed today per patient's request.  If your BMI is 25-29.9 you are overweight. If your BMI is 30 or greater you are obese. To lose weight eat less, move more, or both. Any diet that reduces caloric intake can help with weight loss. Extra weight may reduce your lifespan. Avoid dramatic unsustainable dietary changes that result in the yo-yo effect (down then back up.)  Usually small modifications in diet and exercise are easier to stick with.  7-8.  Continue to follow-up with neurology Dr. León.  9. Follow up with appropriate healthcare providers for all other medical problems.  10. F/u in 4 months for first compliance, SHARLENE.       GEOFF Zamora.      This dictation was created using voice recognition software. The accuracy of the dictation is limited to the abilities of the software. I expect there may be some errors of grammar and possibly content.

## 2022-05-24 DIAGNOSIS — R60.0 LOWER EXTREMITY EDEMA: ICD-10-CM

## 2022-05-25 RX ORDER — HYDROCHLOROTHIAZIDE 25 MG/1
25 TABLET ORAL DAILY
Qty: 30 TABLET | Refills: 0 | Status: SHIPPED | OUTPATIENT
Start: 2022-05-25 | End: 2022-06-10

## 2022-05-31 DIAGNOSIS — F34.0 CYCLOTHYMIC DISORDER: ICD-10-CM

## 2022-05-31 RX ORDER — LITHIUM CARBONATE 300 MG
TABLET ORAL
Qty: 30 TABLET | Refills: 2 | Status: SHIPPED | OUTPATIENT
Start: 2022-05-31 | End: 2022-07-12

## 2022-06-01 ENCOUNTER — OFFICE VISIT (OUTPATIENT)
Dept: MEDICAL GROUP | Facility: PHYSICIAN GROUP | Age: 47
End: 2022-06-01
Payer: COMMERCIAL

## 2022-06-01 VITALS
SYSTOLIC BLOOD PRESSURE: 124 MMHG | HEART RATE: 98 BPM | TEMPERATURE: 97.8 F | WEIGHT: 263 LBS | OXYGEN SATURATION: 92 % | HEIGHT: 68 IN | BODY MASS INDEX: 39.86 KG/M2 | DIASTOLIC BLOOD PRESSURE: 76 MMHG

## 2022-06-01 DIAGNOSIS — M06.09 RHEUMATOID ARTHRITIS WITHOUT RHEUMATOID FACTOR, MULTIPLE SITES (HCC): ICD-10-CM

## 2022-06-01 DIAGNOSIS — R90.89 ABNORMAL BRAIN MRI: ICD-10-CM

## 2022-06-01 DIAGNOSIS — Z79.899 LITHIUM USE: ICD-10-CM

## 2022-06-01 DIAGNOSIS — H53.40 VISUAL FIELD DEFECTS: ICD-10-CM

## 2022-06-01 DIAGNOSIS — R79.89 LOW SERUM T4 LEVEL: ICD-10-CM

## 2022-06-01 DIAGNOSIS — E55.9 VITAMIN D DEFICIENCY: ICD-10-CM

## 2022-06-01 DIAGNOSIS — R06.09 DYSPNEA ON EXERTION: ICD-10-CM

## 2022-06-01 PROCEDURE — 99214 OFFICE O/P EST MOD 30 MIN: CPT | Performed by: STUDENT IN AN ORGANIZED HEALTH CARE EDUCATION/TRAINING PROGRAM

## 2022-06-01 ASSESSMENT — FIBROSIS 4 INDEX: FIB4 SCORE: 1.44

## 2022-06-01 NOTE — PROGRESS NOTES
Subjective:     Chief Complaint   Patient presents with   • Follow-Up     HPI:   Kasie presents today to follow-up.     Since her last visit he is seen neurology and gastroenterology.  She continues to be followed by psychiatry.  She has pending imaging orders, only some are scheduled.  She also saw her optometrist and was diagnosed with visual field defect with concern for abnormal cup-to-disc ratio and has been recommended to see a neuro-ophthalmologist but has not received anything regarding this. Gait is as prior, hasn't made appt with urogyn for chronic urinary issues.    Gastroenterology recommended increasing her water and starting MiraLAX which patient has not done yet.  She did start lithium about 3 to 4 weeks ago.  Her neurologist did order some labs and asked that I follow-up on some of her thyroid function which was slightly abnormal.  She does report chronic constipation as prior, fatigue with history of sleep apnea with BiPAP needed which she is waiting for but does endorse heat intolerance rather than cold intolerance.  Does report that she drinks a lot of energy drinks, her neurologist had messaged her to get back to her regarding whether or not she is taking a multivitamin or drinking energy drinks but has not messaged her back yet.    She still continues with dyspnea on exertion which is unchanged, does have some small amount of lower extremity edema that started end of the day and improves with compression which she at times finds hard time wearing due to heat intolerance.    Current Outpatient Medications Ordered in Epic   Medication Sig Dispense Refill   • lithium carbonate, IR, 300 MG Tab tablet TAKE 1 TABLET BY MOUTH EVERYDAY AT BEDTIME 30 Tablet 2   • hydroCHLOROthiazide (HYDRODIURIL) 25 MG Tab TAKE 1 TABLET BY MOUTH EVERY DAY 30 Tablet 0   • DUPIXENT 300 MG/2ML injection Inject 300 mg under the skin every 14 days.     • zolpidem (AMBIEN) 5 MG Tab Take 1 Tablet by mouth at bedtime as needed  for Sleep for up to 30 days. 30 Tablet 0   • WIXELA INHUB 250-50 MCG/ACT AEROSOL POWDER, BREATH ACTIVATED TAKE 1 PUFF BY MOUTH EVERY 12 HOURS 60 Each 3   • pregabalin (LYRICA) 200 MG capsule TAKE 1 CAPSULE BY MOUTH IN THE MORNING AND 2 CAPSULES AT NIGHT 270 Capsule 0   • atorvastatin (LIPITOR) 20 MG Tab Take 1 Tablet by mouth every day. 90 Tablet 3   • cyclobenzaprine (FLEXERIL) 10 mg Tab Take 1 Tablet by mouth every day. 90 Tablet 1   • promethazine (PHENERGAN) 25 MG Tab Take 1 Tablet by mouth every 6 hours as needed for Nausea/Vomiting. 30 Tablet 0   • ketoconazole (NIZORAL) 2 % Cream Apply 1 Application topically every day. 30 g 1   • calcipotriene (DOVONEX) 0.005 % Cream      • Acetaminophen (TYLENOL PO) Take  by mouth.     • levalbuterol (XOPENEX HFA) 45 MCG/ACT inhaler Inhale 1-2 Puffs every four hours as needed for Shortness of Breath (asthma symptoms). 30 g 3   • celecoxib (CELEBREX) 200 MG Cap Take 200 mg by mouth every day.     • DULoxetine (CYMBALTA) 60 MG Cap DR Particles delayed-release capsule Take 120 mg by mouth every day.     • metaxalone (SKELAXIN) 800 MG Tab Take 800 mg by mouth every 12 hours as needed.     • propranolol CR (INDERAL LA) 80 MG CAPSULE SR 24 HR Take 80 mg by mouth every day.     • omeprazole (PRILOSEC) 40 MG delayed-release capsule Take 40 mg by mouth every day.     • ergocalciferol (DRISDOL) 78082 UNIT capsule Take 1 Cap by mouth. Take 2 a week for one month, then 1 weekly. Refills for 4 at a time 8 Cap 11   • ALPRAZolam (XANAX) 0.5 MG Tab Take 1 Tablet by mouth at bedtime as needed for Sleep for up to 15 doses. 15 Tablet 0     No current Epic-ordered facility-administered medications on file.     ROS:  Gen: no fevers/chills, no changes in weight  Eyes: Issues with peripheral vision  ENT: no sore throat  Pulm: no sob at rest, no cough  CV: no chest pain, no palpitations  GI: no nausea/vomiting, no diarrhea.  Constipation.  MSk: Chronic pain, followed by physiatry  Skin: no  "rash  Neuro: no headaches, no numbness/tingling  Heme/Lymph: no easy bruising    Objective:     Exam:  /76 (BP Location: Left arm, Patient Position: Sitting, BP Cuff Size: Large adult)   Pulse 98   Temp 36.6 °C (97.8 °F) (Temporal)   Ht 1.727 m (5' 8\")   Wt 119 kg (263 lb)   LMP 07/28/2006   SpO2 92%   BMI 39.99 kg/m²  Body mass index is 39.99 kg/m².    Physical Exam:  Constitutional: Well-developed and well-nourished. No acute distress.   Skin: Skin is warm and dry. No rash noted.  Head: Atraumatic without lesions.  Eyes: Conjunctivae and extraocular motions are normal. Pupils are equal, round. No scleral icterus.   Mouth/Throat: Wearing mask.  Neck: Supple, trachea midline.   Cardiovascular: Regular rate and rhythm, S1 and S2 without murmur, rubs, or gallops.  Lungs: Normal inspiratory effort, CTA bilaterally, no wheezes/rhonchi/rales  Extremities: No cyanosis, clubbing, erythema, nor edema.  Neurological: Alert and oriented x 3.   Psychiatric:   Appearance: Clothing and grooming normal.  Mood: 'a bit overwhelmed lately  Behavior: No psychomotor abnormalities or impulsivity. Attention is good. Patient is pleasant and cooperative.   Eye contact: fair   Affect: flat  Speech/thought content: Normal speech pattern. Normal insight and reasoning, no evidence of psychotic process. Does not endorse suicidal or homicidal thoughts.     Labs: Reviewed from 5/18/2022  Imaging: Chest x-ray 12/10/2021    Assessment & Plan:     47 y.o. female with the following -     1. Low serum T4 level  2. Lithium use  Reviewed recent abnormal T4 level with normal TSH, will repeat full thyroid function test to evaluate further.  Recent creatinine normal.  Stressed importance of adequate hydration while taking lithium.  - TSH; Future  - FREE THYROXINE; Future  - TRIIDOTHYRONINE; Future    3. Visual field defects  4. Abnormal brain MRI.  Reviewed optometry notes and recommendations.  Followed by Dr. Estefany jA, optic nerve " cupping and visual defect. Dr. Aj recommended evaluation with Dr. Hill. She has not heard about a referral to neuro-ophthalmology so referral placed today.  She does have follow-up with imaging requested by neurology, encouraged her to schedule and has an appointment scheduled with neurosurgery as well.  - Referral to Neuroopthamology    5. Dyspnea on exertion  This is a chronic condition, pending further evaluation as recommended by pulmonary medicine.  Encouraged her to complete testing as directed.    6. Rheumatoid arthritis without rheumatoid factor, multiple sites ([facility])  Previously followed by Dr. Olivo but has been lost to follow-up.  Referred.  - Referral to Rheumatology    7. Vitamin D deficiency  Noted prior, neurologist had recommended decreasing either energy drink a multivitamin as B6 could be contributing to neuropathy.  Patient to stop multivitamin and can take 1000 international units vitamin D daily.  Will trend with today's labs.  - VITAMIN D,25 HYDROXY; Future    I spent a total of 36 minutes with record review, exam, communication with the patient, and documentation of this encounter.    Return in about 3 months (around 9/1/2022), or if symptoms worsen or fail to improve.    Please note that this dictation was created using voice recognition software. I have made every reasonable attempt to correct obvious errors, but I expect that there are errors of grammar and possibly content that I did not discover before finalizing the note.

## 2022-06-01 NOTE — PATIENT INSTRUCTIONS
[phone] imaging    Dr. Melo urogyn [phone]    [facility] [address] [place] [address]  Phone: [phone]  To schedule therapy    Vitamin D3 1000IU daily

## 2022-06-09 DIAGNOSIS — F43.10 PTSD (POST-TRAUMATIC STRESS DISORDER): ICD-10-CM

## 2022-06-09 RX ORDER — ALPRAZOLAM 0.5 MG/1
0.5 TABLET ORAL NIGHTLY PRN
Qty: 15 TABLET | Refills: 0 | Status: SHIPPED | OUTPATIENT
Start: 2022-06-09 | End: 2022-06-24

## 2022-06-10 ENCOUNTER — OFFICE VISIT (OUTPATIENT)
Dept: PHYSICAL MEDICINE AND REHAB | Facility: MEDICAL CENTER | Age: 47
End: 2022-06-10
Payer: COMMERCIAL

## 2022-06-10 VITALS
HEART RATE: 110 BPM | DIASTOLIC BLOOD PRESSURE: 92 MMHG | OXYGEN SATURATION: 95 % | TEMPERATURE: 98.7 F | BODY MASS INDEX: 39.83 KG/M2 | HEIGHT: 68 IN | SYSTOLIC BLOOD PRESSURE: 132 MMHG | WEIGHT: 262.79 LBS

## 2022-06-10 DIAGNOSIS — M54.59 PAIN OF LUMBAR FACET JOINT: ICD-10-CM

## 2022-06-10 DIAGNOSIS — M62.830 BACK MUSCLE SPASM: ICD-10-CM

## 2022-06-10 DIAGNOSIS — F41.1 GENERALIZED ANXIETY DISORDER: ICD-10-CM

## 2022-06-10 DIAGNOSIS — M79.10 MYALGIA: ICD-10-CM

## 2022-06-10 DIAGNOSIS — R20.0 NUMBNESS AND TINGLING: ICD-10-CM

## 2022-06-10 DIAGNOSIS — G89.4 CHRONIC PAIN SYNDROME: ICD-10-CM

## 2022-06-10 DIAGNOSIS — R20.2 NUMBNESS AND TINGLING: ICD-10-CM

## 2022-06-10 DIAGNOSIS — M47.816 LUMBAR SPONDYLOSIS: Primary | ICD-10-CM

## 2022-06-10 DIAGNOSIS — M54.16 LUMBAR RADICULOPATHY: ICD-10-CM

## 2022-06-10 PROCEDURE — 99214 OFFICE O/P EST MOD 30 MIN: CPT | Performed by: STUDENT IN AN ORGANIZED HEALTH CARE EDUCATION/TRAINING PROGRAM

## 2022-06-10 RX ORDER — ZOLPIDEM TARTRATE 5 MG/1
5 TABLET ORAL NIGHTLY PRN
Qty: 30 TABLET | Refills: 0 | Status: SHIPPED | OUTPATIENT
Start: 2022-06-10 | End: 2022-07-12

## 2022-06-10 RX ORDER — TIZANIDINE 4 MG/1
2-4 TABLET ORAL NIGHTLY PRN
Qty: 30 TABLET | Refills: 2 | Status: SHIPPED | OUTPATIENT
Start: 2022-06-10 | End: 2022-08-02

## 2022-06-10 ASSESSMENT — FIBROSIS 4 INDEX: FIB4 SCORE: 1.44

## 2022-06-10 ASSESSMENT — PATIENT HEALTH QUESTIONNAIRE - PHQ9
SUM OF ALL RESPONSES TO PHQ QUESTIONS 1-9: 7
5. POOR APPETITE OR OVEREATING: 1 - SEVERAL DAYS
CLINICAL INTERPRETATION OF PHQ2 SCORE: 2

## 2022-06-10 ASSESSMENT — PAIN SCALES - GENERAL: PAINLEVEL: 8=MODERATE-SEVERE PAIN

## 2022-06-10 NOTE — PROGRESS NOTES
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 Low back pain is comparatively worse on average, but her neck pain is worse today.     She reports her pain is located at her right low back and radiates down her right leg.  She also endorses tingling in her anterior thighs bilaterally and lateral right distal calf.  She endorses right leg weakness and has fallen, most recently 3 months ago.  She states she first noticed numbness and tingling in her legs about 5 years ago but that has been progressively worsening.     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  She states this pain radiates into her bilateral hands (R>L) especially her right 2nd-4th fingers.  She also endorses pain, numbness, and tingling in her left thumb and second finger.  She states she has received 2 EMGs in the past.  EMG 2011 reviewed today indicates electrodiagnostic evidence of right sensory radial neuropathy but not median neuropathy or ulnar neuropathy on either side.  Cervical radiculopathy could not be assessed due to patient's difficulty tolerating the needle part of the study.       Of note she reports approximately 1 month of new symptoms including being off balance and nocturnal urinary incontinence.  She denies noticeable change in cognition.  Along with this she has developed breathing difficulty for which she has been referred to a pulmonologist by her PCP. She denies urinary incontinence during the day, bowel incontinence, or saddle anesthesia.       Her pain at its best-worse level during the course of the day is 5-8/10, respectively. Pain right now is 7/10 on the numeric pain scale. Pain worsens with standing, walking, side bending or twisting, walking upstairs and walking downstairs and improves with nothing. Her pain interferes somewhat with ADLs. The patient otherwise denies new weakness, numbness, or bladder/bowel incontinence. Endorses muscle spasms.      Also notices swelling in hands and calves. Sees rheumatology Dr. Olivo.     Limited in physical activity due to being short of breath.     The patient has done physical therapy for this problem for about 1 year. Notes improved posture with PT. Used to go to chiropractor which helped. Last time she went to chiropractor, 3 years ago, she had severe pain and weakness of right side lasting 2 days so she has not returned.     Patient has tried the following medications with varied success (current meds in bold):   Flexeril - takes once per day at night  cymbalta - primarily for depression. No noticeable relief in pain.  Skelaxin - takes once per day in afternoon. helps when lying still  lyrica - significant relief. Takes 200mg TID     Therapeutic modalities and interventional therapies to date include:  -pt reports having received TPI with no relief for pain level past 6/10  - cervical epidural steroid injection with 3-4 months relief at a time (last had years ago)  -Cervical medial branch blocks x1.  Pt states her insurance changed and couldn't proceed with treatment.   - has not had injections for low back    HPI  Today's visit:  Kasie Rodriguez is a 47 y.o. female with The primary encounter diagnosis was Lumbar spondylosis. Diagnoses of Pain of lumbar facet joint, Chronic pain syndrome, Myalgia, Numbness and tingling, Lumbar radiculopathy, and Back muscle spasm were also pertinent to this visit.    Kasie Rodriguez presents today with continuous low back pain (R>L) that has recently  worsened.  Pain radiates slightly down her right low back but not past her mid glute.  She endorses ongoing painful spasms at her low back. Worse with lying down and better with sitting up. Denies known inciting event for worsening pain.     Endorses difficulty sleeping with decreased ambien. Deep heat and TENS unit which typically help are now worsening her pain. Endorses continued muscle weakness in her legs which she thinks is more neuro related.  Denies any numbness or tingling.     States that for now she would like to avoid any diagnostic treatments such as lumbar medial branch blocks.  She is interested in therapeutic treatments such as facet joint steroid injections if possible. She notes previous relief with cervical facet joint steroid injections with another provider.      Currently taking flexeril QHS for pain and lyrica 1 tab in the morning and 2 at night.  States Flexeril is not helping as much as she would like.  Also takes Skelaxin as needed.    Progress notes reviewed from neuro visit Dr. León 4/25/22 indicating workup for neuropathy, chronic lesion of cerebellar vermis, pulm note 5/19/22 indicating mixed restrictive obstructive pattern for which CT chest and sniff test ordered, sleep note 5/23/22 indicating BiPAP, PCP note 6/1/22 indicating visual field defect and referral to neuro-ophthalmologist    Of note she reports she is awaiting an MRA to further evaluate finding of optic nerve swelling.    ROS:   Red Flags ROS:  Fever, Chills, Sweats: Denies  Involuntary Weight Loss: Denies  Bladder Incontinence: Denies  Bowel Incontinence: denies  Saddle Anesthesia: Denies    All other systems reviewed and negative.     PMHx:   Past Medical History:   Diagnosis Date   • Arthralgia of multiple joints 3/8/2011   • Arthritis    • ASTHMA 1/20/2010   • Elevated antinuclear antibody (JOSE C) level 3/8/2011   • Personal history of hydronephrosis    • Rhinitis, allergic 1/20/2010   • Vitamin d deficiency 3/8/2011        PSHx:   Past Surgical History:   Procedure Laterality Date   • ABDOMINAL HYSTERECTOMY TOTAL      endometriosis and bleeding   • OOPHORECTOMY Bilateral    • SALPINGECTOMY Bilateral    • ABDOMINAL EXPLORATION      endometriosis   • CHOLECYSTECTOMY     • TONSILLECTOMY         Family Hx:   Family History   Problem Relation Age of Onset   • Arthritis Mother    • Heart Disease Mother    • Colon Cancer Father         70s   • Alcohol abuse Maternal Grandmother    • Suicide Attempts Maternal Grandmother          by suicide   • Bipolar disorder Maternal Grandmother    • Alcohol abuse Maternal Grandfather    • Brain Cancer Maternal Grandfather    • Alcohol abuse Paternal Grandmother    • Breast Cancer Paternal Grandmother          of breast CA   • Alcohol abuse Paternal Grandfather    • Heart Disease Paternal Grandfather    • Other Daughter         parotid tumor, vertigo   • Brain Cancer Son        Social Hx:  Social History     Socioeconomic History   • Marital status:      Spouse name: Not on file   • Number of children: 2   • Years of education: Not on file   • Highest education level: Not on file   Occupational History   • Not on file   Tobacco Use   • Smoking status: Never Smoker   • Smokeless tobacco: Never Used   Vaping Use   • Vaping Use: Never used   Substance and Sexual Activity   • Alcohol use: Yes     Comment: 1 glass of wine on occasion   • Drug use: Never   • Sexual activity: Yes     Partners: Male     Birth control/protection: Female Sterilization   Other Topics Concern   • Not on file   Social History Narrative    3 foster children currently.     Social Determinants of Health     Financial Resource Strain: Not on file   Food Insecurity: Not on file   Transportation Needs: Not on file   Physical Activity: Not on file   Stress: Not on file   Social Connections: Not on file   Intimate Partner Violence: Not on file   Housing Stability: Not on file       Allergies:  Allergies    Allergen Reactions   • Biaxin [Clarithromycin] Vomiting   • Latex        Medications: reviewed on epic.   Outpatient Medications Marked as Taking for the 6/10/22 encounter (Office Visit) with Na Veloz M.D.   Medication Sig Dispense Refill   • tizanidine (ZANAFLEX) 4 MG Tab Take 0.5-1 Tablets by mouth at bedtime as needed (muscle spasms). 30 Tablet 2   • vitamin D2, Ergocalciferol, (DRISDOL) 1.25 MG (53095 UT) Cap capsule TAKE 1 CAPSULE (50,000 UNITS TOTAL) BY MOUTH ONCE A WEEK 12 Capsule 0   • ALPRAZolam (XANAX) 0.5 MG Tab Take 1 Tablet by mouth at bedtime as needed for Sleep for up to 15 doses. 15 Tablet 0   • lithium carbonate, IR, 300 MG Tab tablet TAKE 1 TABLET BY MOUTH EVERYDAY AT BEDTIME 30 Tablet 2   • hydroCHLOROthiazide (HYDRODIURIL) 25 MG Tab TAKE 1 TABLET BY MOUTH EVERY DAY 30 Tablet 0   • DUPIXENT 300 MG/2ML injection Inject 300 mg under the skin every 14 days.     • WIXELA INHUB 250-50 MCG/ACT AEROSOL POWDER, BREATH ACTIVATED TAKE 1 PUFF BY MOUTH EVERY 12 HOURS 60 Each 3   • pregabalin (LYRICA) 200 MG capsule TAKE 1 CAPSULE BY MOUTH IN THE MORNING AND 2 CAPSULES AT NIGHT 270 Capsule 0   • atorvastatin (LIPITOR) 20 MG Tab Take 1 Tablet by mouth every day. 90 Tablet 3   • cyclobenzaprine (FLEXERIL) 10 mg Tab Take 1 Tablet by mouth every day. 90 Tablet 1   • promethazine (PHENERGAN) 25 MG Tab Take 1 Tablet by mouth every 6 hours as needed for Nausea/Vomiting. 30 Tablet 0   • ketoconazole (NIZORAL) 2 % Cream Apply 1 Application topically every day. 30 g 1   • Acetaminophen (TYLENOL PO) Take  by mouth.     • levalbuterol (XOPENEX HFA) 45 MCG/ACT inhaler Inhale 1-2 Puffs every four hours as needed for Shortness of Breath (asthma symptoms). 30 g 3   • celecoxib (CELEBREX) 200 MG Cap Take 200 mg by mouth every day.     • DULoxetine (CYMBALTA) 60 MG Cap DR Particles delayed-release capsule Take 120 mg by mouth every day.     • metaxalone (SKELAXIN) 800 MG Tab Take 800 mg by mouth every 12 hours as  needed.     • propranolol CR (INDERAL LA) 80 MG CAPSULE SR 24 HR Take 80 mg by mouth every day.     • omeprazole (PRILOSEC) 40 MG delayed-release capsule Take 40 mg by mouth every day.          Current Outpatient Medications on File Prior to Visit   Medication Sig Dispense Refill   • vitamin D2, Ergocalciferol, (DRISDOL) 1.25 MG (39717 UT) Cap capsule TAKE 1 CAPSULE (50,000 UNITS TOTAL) BY MOUTH ONCE A WEEK 12 Capsule 0   • ALPRAZolam (XANAX) 0.5 MG Tab Take 1 Tablet by mouth at bedtime as needed for Sleep for up to 15 doses. 15 Tablet 0   • lithium carbonate, IR, 300 MG Tab tablet TAKE 1 TABLET BY MOUTH EVERYDAY AT BEDTIME 30 Tablet 2   • hydroCHLOROthiazide (HYDRODIURIL) 25 MG Tab TAKE 1 TABLET BY MOUTH EVERY DAY 30 Tablet 0   • DUPIXENT 300 MG/2ML injection Inject 300 mg under the skin every 14 days.     • WIXELA INHUB 250-50 MCG/ACT AEROSOL POWDER, BREATH ACTIVATED TAKE 1 PUFF BY MOUTH EVERY 12 HOURS 60 Each 3   • pregabalin (LYRICA) 200 MG capsule TAKE 1 CAPSULE BY MOUTH IN THE MORNING AND 2 CAPSULES AT NIGHT 270 Capsule 0   • atorvastatin (LIPITOR) 20 MG Tab Take 1 Tablet by mouth every day. 90 Tablet 3   • cyclobenzaprine (FLEXERIL) 10 mg Tab Take 1 Tablet by mouth every day. 90 Tablet 1   • promethazine (PHENERGAN) 25 MG Tab Take 1 Tablet by mouth every 6 hours as needed for Nausea/Vomiting. 30 Tablet 0   • ketoconazole (NIZORAL) 2 % Cream Apply 1 Application topically every day. 30 g 1   • Acetaminophen (TYLENOL PO) Take  by mouth.     • levalbuterol (XOPENEX HFA) 45 MCG/ACT inhaler Inhale 1-2 Puffs every four hours as needed for Shortness of Breath (asthma symptoms). 30 g 3   • celecoxib (CELEBREX) 200 MG Cap Take 200 mg by mouth every day.     • DULoxetine (CYMBALTA) 60 MG Cap DR Particles delayed-release capsule Take 120 mg by mouth every day.     • metaxalone (SKELAXIN) 800 MG Tab Take 800 mg by mouth every 12 hours as needed.     • propranolol CR (INDERAL LA) 80 MG CAPSULE SR 24 HR Take 80 mg by mouth  "every day.     • omeprazole (PRILOSEC) 40 MG delayed-release capsule Take 40 mg by mouth every day.     • calcipotriene (DOVONEX) 0.005 % Cream  (Patient not taking: Reported on 6/10/2022)       No current facility-administered medications on file prior to visit.         EXAMINATION     Physical Exam:   BP (!) 132/92 (BP Location: Right arm, Patient Position: Sitting, BP Cuff Size: Adult)   Pulse (!) 110   Temp 37.1 °C (98.7 °F) (Temporal)   Ht 1.727 m (5' 8\")   Wt 119 kg (262 lb 12.6 oz)   SpO2 95%     Constitutional:   Body Habitus: Body mass index is 39.96 kg/m².  Cooperation: Fully cooperates with exam  Appearance: Well-groomed, well-nourished.    Eyes: No scleral icterus to suggest severe liver disease, no proptosis to suggest severe hyperthyroidism    ENT -no obvious auditory deficits, no noticeable facial droop     Skin -no rashes or lesions noted     Respiratory-  breathing comfortably on room air, no audible wheezing    Cardiovascular-distal extremities warm and well perfused.  No lower extremity edema is noted.     Gastrointestinal - no obvious abdominal masses, non-distended    Psychiatric- alert and oriented ×3. Normal affect.     Gait - no use of ambulatory device, nonantalgic. Gait notable for decreased hip flexion. previous exam: unable to heel walk with difficulty balancing.  Able to rise onto toes, however having difficulty balancing with toe walk.     Musculoskeletal and Neuro -        Thoracic/Lumbar Spine/Sacral Spine/Hips   Inspection: No evidence of atrophy in bilateral lower extremities throughout      There is limited active range of motion with lumbar extension     Facet loading maneuver pos on right, neg on left     Sensation intact to light touch at bilateral lower extremities     Tenderness to palpation over the right low back, no tenderness to palpation at left low back or bilateral sacroiliac joints     Lumbar spine /hip provocative exam maneuvers  Straight leg raise negative " bilaterally  FADIR test negative bilaterally     SI joint tests  VIOLETTA test negative bilaterally  Thigh thrust test negative bilaterally     Motor Exam Lower Extremities  ? Myotome R L   Hip flexion L2 5 5   Knee extension L3 5 5   Ankle dorsiflexion L4 5 5   Toe extension L5 5 5   Ankle plantarflexion S1 5 5         Previous exam  Cervical spine / upper extremities  Inspection: No deformities of the skin over the cervical spine. No rashes or lesions.     limited active range of motion in all directions.  Forward hunched posture with palpably increased muscular tension throughout posterior upper back.     Spurling's sign  negative bilaterally  Cervical facet loading maneuver  negative bilaterally     No signs of muscular atrophy in bilateral upper extremities      Tenderness to palpation at right paracervical muscle and bilateral upper trapezius.        Motor Exam Upper Extremities   ? Myotome R L   Shoulder abduction C5 5 5   Elbow flexion C5 5 5   Wrist extension C6 4+* 4+*   Elbow extension C7 5 5   Finger flexion C8 4- 4-   Finger abduction T1 4-^ 4-^   * giveway weakness  ^ Decreased ROM     Previous exam    Impaired sensation to light touch at anterior right upper trapezius, right dorsum of thumb, right lateral shoulder, right anterior wrist     Tinel's at the wrist over the median nerve negative bilaterally  Tinel's at the cubital tunnel: negative bilaterally    Reflexes  ?   R L   Biceps   2+ 2+   Brachioradialis   2+ 2+      Smith's sign positive right, negative left      ROM: limited active range of motion with lumbar flexion, lateral flexion, and rotation bilaterally.       Reflexes  ?   R L   Patella   2+ 2+   Achilles    2+ 2+      Clonus of the ankle positive bilaterally             MEDICAL DECISION MAKING    Medical records review: see under HPI section.     DATA    Labs: Personally reviewed at today's visit:     Lab Results   Component Value Date/Time    SODIUM 139 05/18/2022 08:40 AM    POTASSIUM  4.4 05/18/2022 08:40 AM    CHLORIDE 99 05/18/2022 08:40 AM    CO2 30 05/18/2022 08:40 AM    ANION 10.0 05/18/2022 08:40 AM    GLUCOSE 90 05/18/2022 08:40 AM    BUN 18 05/18/2022 08:40 AM    CREATININE 0.98 05/18/2022 08:40 AM    CREATININE 0.91 02/23/2011 09:27 AM    CALCIUM 9.6 05/18/2022 08:40 AM    ASTSGOT 34 12/10/2021 04:31 PM    ALTSGPT 37 12/10/2021 04:31 PM    TBILIRUBIN 0.3 12/10/2021 04:31 PM    ALBUMIN 3.74 (L) 05/18/2022 08:40 AM    TOTPROTEIN 7.0 05/18/2022 08:40 AM    GLOBULIN 2.7 12/10/2021 04:31 PM    AGRATIO 1.6 12/10/2021 04:31 PM       No results found for: PROTHROMBTM, INR     Lab Results   Component Value Date/Time    WBC 7.5 12/10/2021 04:31 PM    RBC 5.31 12/10/2021 04:31 PM    HEMOGLOBIN 15.3 12/10/2021 04:31 PM    HEMATOCRIT 45.8 12/10/2021 04:31 PM    MCV 86.3 12/10/2021 04:31 PM    MCH 28.8 12/10/2021 04:31 PM    MCHC 33.4 (L) 12/10/2021 04:31 PM    MPV 10.6 12/10/2021 04:31 PM    NEUTSPOLYS 52.40 12/10/2021 04:31 PM    LYMPHOCYTES 33.80 12/10/2021 04:31 PM    MONOCYTES 6.40 12/10/2021 04:31 PM    EOSINOPHILS 6.10 12/10/2021 04:31 PM    BASOPHILS 0.80 12/10/2021 04:31 PM        Lab Results   Component Value Date/Time    HBA1C 5.3 05/18/2022 08:40 AM        EMG/NCS 3/16/11 by Dr. Martinez       Imaging:   I personally reviewed following images, these are my reads  X-ray lumbar spine 12/10/2010  Normal alignment.  No evidence of significant facet arthropathy or disc space narrowing.  Mild sclerosis of bilateral sacroiliac joints     MRI cervical spine 3/7/11  Disc bulge at C3-4, C4-5, C5-6, C6-7 with Modic changes at C6-7.  Mild-moderate neuroforaminal stenosis at right C5-C6.  Very mild spinal canal stenosis at C4-5 and C6-7. Decreased cervical lordosis    MRI cervical spine 1/13/22  Severe right and mild left neuroforaminal stenosis at C4-C5, moderate to severe bilateral neuroforaminal stenosis at C5-C6. Facet arthropathy at C3-C4, C4-C5, and C5-C6. Mild central canal stenosis at  C5-C6.      MRI lumbar spine 1/13/22  No significant neuroforaminal stenosis at any level. Facet arthropathy at bilateral L4-L5 and L5-S1. Central and right paracentral disc extrusion at L1-L2 that is not impinging on the adjacent nerve root or central canal.    IMAGING radiology reads. I reviewed the following radiology reads       Results for orders placed during the hospital encounter of 01/26/22    MR-BRAIN-W/O    Impression  1.  10 mm x 7 mm x 7 mm notch-like defect in the posterior-inferior midline cerebellar vermis. This was not present on MRI cervical spine from 3/7/2011. The lesion is isointense with CSF and appears contiguous with cisterna magna. Differential  considerations would include development of an arachnoid cyst invaginating the cerebellar vermis versus macrocystic encephalomalacia extending to the vermian surface consistent with an old infarct or other remote cerebellar vermis insult. Uncertain  whether this may be related to the patient's unsteady gait.             Results for orders placed in visit on 01/13/22    MR-CERVICAL SPINE-W/O    Impression  Mild degenerative disease in the cervical spine as described above. When compared with the previous MRI dated 3/7/2011, there has been mild interval progression of the degenerative disease.      Results for orders placed in visit on 01/13/22    MR-LUMBAR SPINE-W/O    Impression  There is central and right paracentral disc extrusion at L1-2. The extruded disc fragment migrated upwards. There is mild effacement of the ventral subarachnoid space. There is no significant canal compromise.     MR-CERVICAL SPINE-W/O 03/07/11    FINDINGS:   The cervical spine maintains normal height and alignment.     At the level of C2-3, there is no spinal or neural foraminal stenosis.     At the level of C3-4, there is mild disk bulge causing mild effacement of   the anterior thecal sac.     At the level of C4-5, there is mild disk bulge causing mild spinal canal    stenosis.  There is flattening of the anterior spinal cord contour.     At the level of C5-6, there is mild disk bulge causing effacement of the   anterior thecal sac.  There is mild flattening of the anterior spinal   cord contour.     At the level of C6-7, there is disk degeneration.  There is diffuse disk   bulge causing mild spinal canal stenosis.  There is flattening of the   anterior spinal cord contour.     At the level of C7-T1, there is no spinal or neural foraminal stenosis.     The visualized posterior fossa structures appear normal within limits.     The cervical spinal cord does not demonstrate any mass or abnormal T2   signal intensity.     The visualized pre-and paraspinal soft tissues appear normal within   limits.         IMPRESSION:   Mild degenerative changes in the cervical spine as described above.       XR lumbar spine 12/10/2010  FINDINGS:   The bony trabecular pattern is normal.  The lumbar vertebral bodies have   a normal height and alignment.  The disc spaces are well maintained and   symmetrical.  There is no evidence of spondylolisthesis, spondylolysis,   or osseous lesion.  The posterior elements are unremarkable.   Mildly sclerotic SI joints.         IMPRESSION:   Normal complete lumbar spine series.  Mildly sclerotic SI joints.      Diagnosis  Visit Diagnoses     ICD-10-CM   1. Lumbar spondylosis  M47.816   2. Pain of lumbar facet joint  M54.59   3. Chronic pain syndrome  G89.4   4. Myalgia  M79.10   5. Numbness and tingling  R20.0    R20.2   6. Lumbar radiculopathy  M54.16   7. Back muscle spasm  M62.830         ASSESSMENT AND PLAN:  Kasie Rodriguez  1975 is a female with history of rheumatoid arthritis followed by rheumatology Dr. Olivo, vitamin D deficiency, insomnia, anxiety, depression, lower extremity edema, IBS, BMI 40 who presents with ongoing right-sided low back pain.      She has a positive right facet loading maneuver and imaging findings of facet  arthropathy at L4-L5 and L5-S1, suggestive of lumbar facetogenic pain. Negative SLR and Spurlings today.  Pain does not radiate past her mid glute, suggesting that nerve impingement is less likely to be a cause of her symptoms.  Additionally no significant nerve root impingement was seen on recent MRI lumbar spine 1/13/22.    She has being followed by neurology for symptoms including ongoing gait instability with MRI brain revealing cerebellar lesion.     Kasie was seen today for follow-up.    Diagnoses and all orders for this visit:    Lumbar spondylosis  -     Referral to Pain Clinic    Pain of lumbar facet joint  -     Referral to Pain Clinic    Chronic pain syndrome    Myalgia    Numbness and tingling    Lumbar radiculopathy    Back muscle spasm    Other orders  -     tizanidine (ZANAFLEX) 4 MG Tab; Take 0.5-1 Tablets by mouth at bedtime as needed (muscle spasms).          The above note documents my personal evaluation of this patient. In addition, I have reviewed and confirmed with the patient and MA the supportive information documented in today's Patient Health Questionnaire and Office Note.       PLAN  Physical Therapy:   -Of note patient states she is currently doing physical therapy for urinary incontinence.   -Consider physical therapy for low back pain in the future     Diagnostic workup: MRI lumbar spine again reviewed today.     Medications:  -Prescribed tizanidine 2-4 mg nightly as needed today for worsening muscle spasms and worsening insomnia.  Counseled her that she should separate this from her other muscle relaxants including Flexeril and Skelaxin by at least 8 hours  - cont lyrica, Flexeril, Cymbalta, Skelaxin, celebrex  -Has tried low dose naltrexone 4.5mg daily given no relief  - consider taking tylenol as needed up to 4 grams per day, in divided doses at least 6 hours apart. Do not take more than 1 gram at a time.     Interventions:   -Patient states she would like to avoid diagnostic  procedures such as lumbar medial branch blocks.  She would like to avoid an eventual radiofrequency ablation and is interested in therapeutic procedures at this time.  -Plan for intra-articular facet joint steroid injection at right L4-5 and L5-S1 with fluoroscopic guidance. The risks, benefits, and alternatives to this procedure were discussed and the patient wishes to proceed with the procedure. Risks include but are not limited to damage to surrounding structures, infection, bleeding, worsening of pain which can be permanent, and weakness which can be permanent. Benefits include pain relief and improved function. Alternatives include not doing the procedure.    - consider TPI PRN    Referrals: Patient previously declined referral for repeat EMG/NCS.  She reports she has had this twice in the past and was difficult to tolerate.     Other:  - Continue follow-up with rheumatology Dr. Olivo     Outside records requested:  The patient previously signed outside records request form for her outside records including outside images. This includes the records from [facility]    Follow-up: 1 month after procedure above    Orders Placed This Encounter   • Referral to Pain Clinic   • tizanidine (ZANAFLEX) 4 MG Tab       Na Veloz MD  Interventional Pain Management  Physical Medicine and Rehabilitation  [facility]    Please note that this dictation was created using voice recognition software. I have made every reasonable attempt to correct obvious errors, but I expect that there are errors of grammar and possibly content that I did not discover before finalizing the note.

## 2022-06-10 NOTE — PATIENT INSTRUCTIONS
Separate muscle relaxants by at least 8 hours. Don't take flexeril, tizanidine, or skelaxin together.    Your procedure will be at the [facility] special procedure suite.    [facility] [place] [address]       PRE-PROCEDURE INSTRUCTIONS  You may take your regular medications except:   No Anti-inflammatories 5 days prior to your procedure. Anti-inflammatories include medicines such as  ibuprofen (Motrin, Advil), Excedrin, Naproxen (Aleve, Anaprox, Naprelan, Naprosyn), Celecoxib (Celebrex), Diclofenac (Voltaren-XR tab), and Meloxicam (Mobic).   You can take the remainder of your pain medications as prescribed.   If you are having a diagnostic procedure such as a medial branch block, do not use her pain meds on the day of the procedure  No Glucophage or Metformin 24 hours before your procedure. You may resume next day after your procedure.  Call the physiatry office if you are taking or prescribed anti-biotics within five days of procedure.  Please ask provider if you are taking any new diabetes medication.  CONTINUE TAKING BLOOD PRESSURE MEDICATIONS AS PRESCRIBED.  Pain medications will not be prescribed on the procedure day. Procedural pain medication may be used by your provider   Call your doctor's office performing the procedure if you have a fever, chills, rash or new illness prior to your procedure    Anticoagulation/antiplatelet medications  No Blood thinning medications such as Coumadin, Xarelto, aspirin or Plavix 5 days prior to procedure unless your doctor said to continue these medications. Call your doctor if a new medication is prescribed in this class.     Restrictions for eating before procedure:   If you are getting procedural sedation, then do not eat to for 8 hours prior to procedure appointment time. Do not drink fluids for four hours prior to your procedure time.   If you are not having procedural sedation, then Skip the meal prior to your procedure. If you have a morning  procedure then skip breakfast. If you have an afternoon procedure then skip lunch.   You may drink clear liquids up to 2 hours prior to your procedure  You must have a  the day of procedure to accompany you home.      POST PROCEDURE INSTRUCTIONS   No heavy lifting, strenuous bending or strenuous exercise for 3 days after your procedure.  No hot tubs, baths, swimming for 3 days after your procedure  You can remove the bandage the day after the procedure.  IF YOU RECEIVED A STEROID INJECTION. PLEASE NOTE THAT THERE MAY BE A DELAY FOR THE INJECTION TO START WORKING, THE DELAY MAY BE UP TO TWO WEEKS. IF YOU HAVE DIABETES, PLEASE NOTE THAT YOUR SUGAR LEVELS MAY BE ELEVATED FOR 1-2 DAYS AFTER A STEROID INJECTION.  THE STEROID MAY CAUSE TEMPORARY SYMPTOMS WHICH USUALLY RESOLVE ON THEIR OWN WITHIN 1 TO 2 DAYS INCLUDING FACIAL FLUSHING OR A FEELING OF WARMTH ON THE FACE, TEMPORARY INCREASES IN BLOOD SUGAR, INSOMNIA, INCREASED HUNGER  IF YOU EXPERIENCE PROLONGED WEAKNESS LONGER THAN ONE DAY, BOWEL OR BLADDER INCONTINENCE THEN PLEASE CALL THE PHYSIATRY OFFICE.  Your leg may feel heavy, weak and numb for up to 1-2 days. Be very careful walking.    You may resume normal activities 3 days after procedure.

## 2022-06-14 ENCOUNTER — HOSPITAL ENCOUNTER (OUTPATIENT)
Facility: REHABILITATION | Age: 47
End: 2022-06-14
Attending: STUDENT IN AN ORGANIZED HEALTH CARE EDUCATION/TRAINING PROGRAM | Admitting: STUDENT IN AN ORGANIZED HEALTH CARE EDUCATION/TRAINING PROGRAM
Payer: COMMERCIAL

## 2022-06-14 DIAGNOSIS — F40.240 CLAUSTROPHOBIA: ICD-10-CM

## 2022-06-14 RX ORDER — ALPRAZOLAM 0.5 MG/1
0.5 TABLET ORAL
Qty: 1 TABLET | Refills: 0 | Status: SHIPPED | OUTPATIENT
Start: 2022-06-14 | End: 2022-08-14

## 2022-06-23 ENCOUNTER — PATIENT MESSAGE (OUTPATIENT)
Dept: PHYSICAL MEDICINE AND REHAB | Facility: MEDICAL CENTER | Age: 47
End: 2022-06-23
Payer: COMMERCIAL

## 2022-06-23 NOTE — TELEPHONE ENCOUNTER
I'm sorry it is so expensive. I wish I could make it more affordable. The cost of injections varies by medical group. I think it may be cheaper with a private practice. I would recommend [facility] or [facility] if she would like a referral to them. Please let me know if she would like a referral. In the meantime I would recommend continuing tizanidine.

## 2022-06-28 ENCOUNTER — PATIENT MESSAGE (OUTPATIENT)
Dept: SLEEP MEDICINE | Facility: MEDICAL CENTER | Age: 47
End: 2022-06-28
Payer: COMMERCIAL

## 2022-07-10 DIAGNOSIS — F41.1 GENERALIZED ANXIETY DISORDER: ICD-10-CM

## 2022-07-11 DIAGNOSIS — F34.0 CYCLOTHYMIC DISORDER: ICD-10-CM

## 2022-07-11 DIAGNOSIS — E55.9 VITAMIN D DEFICIENCY: ICD-10-CM

## 2022-07-11 RX ORDER — ERGOCALCIFEROL 1.25 MG/1
CAPSULE ORAL
Qty: 12 CAPSULE | Refills: 0 | Status: SHIPPED | OUTPATIENT
Start: 2022-07-11 | End: 2022-11-29

## 2022-07-11 NOTE — TELEPHONE ENCOUNTER
Pharmacy requesting DX code.    Received request via: Pharmacy    Was the patient seen in the last year in this department? Yes    Does the patient have an active prescription (recently filled or refills available) for medication(s) requested? No

## 2022-07-12 RX ORDER — LITHIUM CARBONATE 300 MG
TABLET ORAL
Qty: 90 TABLET | Refills: 0 | Status: SHIPPED | OUTPATIENT
Start: 2022-07-12 | End: 2022-08-30 | Stop reason: SDUPTHER

## 2022-07-12 RX ORDER — ZOLPIDEM TARTRATE 5 MG/1
5 TABLET ORAL NIGHTLY PRN
Qty: 30 TABLET | Refills: 0 | Status: SHIPPED | OUTPATIENT
Start: 2022-07-12 | End: 2022-08-16

## 2022-07-26 RX ORDER — DULOXETIN HYDROCHLORIDE 60 MG/1
120 CAPSULE, DELAYED RELEASE ORAL DAILY
Qty: 30 CAPSULE | Refills: 1 | Status: SHIPPED | OUTPATIENT
Start: 2022-07-26 | End: 2022-08-30 | Stop reason: SDUPTHER

## 2022-07-26 RX ORDER — BREXPIPRAZOLE 3 MG/1
3 TABLET ORAL DAILY
Qty: 30 TABLET | OUTPATIENT
Start: 2022-07-26

## 2022-07-26 NOTE — TELEPHONE ENCOUNTER
Pt has upcoming 8/26/22 with Dr Campos. Pt has been out for 2 weeks.    Received request via: Patient    Was the patient seen in the last year in this department? Yes    Does the patient have an active prescription (recently filled or refills available) for medication(s) requested? No

## 2022-07-26 NOTE — PROGRESS NOTES
Follow-up patient Note    Interventional Pain and Spine  Physiatry (Physical Medicine and Rehabilitation)     Patient Name: Kasie Rodriguez  : 1975  Date of Service: 2022      Chief Complaint:   No chief complaint on file.      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 Low back pain is comparatively worse on average, but her neck pain is worse today.     She reports her pain is located at her right low back and radiates down her right leg.  She also endorses tingling in her anterior thighs bilaterally and lateral right distal calf.  She endorses right leg weakness and has fallen, most recently 3 months ago.  She states she first noticed numbness and tingling in her legs about 5 years ago but that has been progressively worsening.     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  She states this pain radiates into her bilateral hands (R>L) especially her right 2nd-4th fingers.  She also endorses pain, numbness, and tingling in her left thumb and second finger.  She states she has received 2 EMGs in the past.  EMG 2011 reviewed today indicates electrodiagnostic evidence of right sensory radial neuropathy but not median neuropathy or ulnar neuropathy on either side.  Cervical radiculopathy could not be assessed due to patient's difficulty tolerating the needle part of the study.       Of note she reports approximately 1 month of new symptoms including being off balance and nocturnal urinary incontinence.  She denies noticeable change in cognition.  Along with this she has developed breathing difficulty for which she has been referred to a pulmonologist by her PCP. She denies urinary incontinence during the day, bowel incontinence, or saddle anesthesia.      Her pain at its best-worse level during the  course of the day is 5-8/10, respectively. Pain right now is 7/10 on the numeric pain scale. Pain worsens with standing, walking, side bending or twisting, walking upstairs and walking downstairs and improves with nothing. Her pain interferes somewhat with ADLs. The patient otherwise denies new weakness, numbness, or bladder/bowel incontinence. Endorses muscle spasms.      Also notices swelling in hands and calves. Sees rheumatology Dr. Olivo.     Limited in physical activity due to being short of breath.     The patient has done physical therapy for this problem for about 1 year. Notes improved posture with PT. Used to go to chiropractor which helped. Last time she went to chiropractor, 3 years ago, she had severe pain and weakness of right side lasting 2 days so she has not returned.     Patient has tried the following medications with varied success (current meds in bold):   Flexeril - takes once per day at night  cymbalta - primarily for depression. No noticeable relief in pain.  Skelaxin - takes once per day in afternoon. helps when lying still  lyrica - significant relief. Takes 200mg TID     Therapeutic modalities and interventional therapies to date include:  -pt reports having received TPI with no relief for pain level past 6/10  - cervical epidural steroid injection with 3-4 months relief at a time (last had years ago)  -Cervical medial branch blocks x1.  Pt states her insurance changed and couldn't proceed with treatment.   - has not had injections for low back    HPI  Today's visit:  Kasie Rodriguez ( 1975) is a female with There were no encounter diagnoses.    Kasie Rodriguez presents today with continuous low back pain (R>L) that has recently worsened.  Pain radiates slightly down her right low back but not past her mid glute.  She endorses ongoing painful spasms at her low back. Worse with lying down and better with sitting up. Denies known inciting event for worsening pain.      Endorses difficulty sleeping with decreased ambien. Deep heat and TENS unit which typically help are now worsening her pain. Endorses continued muscle weakness in her legs which she thinks is more neuro related.  Denies any numbness or tingling.     States that for now she would like to avoid any diagnostic treatments such as lumbar medial branch blocks.  She is interested in therapeutic treatments such as facet joint steroid injections if possible. She notes previous relief with cervical facet joint steroid injections with another provider.      Currently taking flexeril QHS for pain and lyrica 1 tab in the morning and 2 at night.  States Flexeril is not helping as much as she would like.  Also takes Skelaxin as needed.    Progress notes reviewed from neuro visit Dr. León 4/25/22 indicating workup for neuropathy, chronic lesion of cerebellar vermis, pulm note 5/19/22 indicating mixed restrictive obstructive pattern for which CT chest and sniff test ordered, sleep note 5/23/22 indicating BiPAP, PCP note 6/1/22 indicating visual field defect and referral to neuro-ophthalmologist    Of note she reports she is awaiting an MRA to further evaluate finding of optic nerve swelling.    ROS:   Red Flags ROS:  Fever, Chills, Sweats: Denies  Involuntary Weight Loss: Denies  Bladder Incontinence: Denies  Bowel Incontinence: denies  Saddle Anesthesia: Denies    All other systems reviewed and negative.     PMHx:   Past Medical History:   Diagnosis Date   • Arthralgia of multiple joints 3/8/2011   • Arthritis    • ASTHMA 1/20/2010   • Elevated antinuclear antibody (JOSE C) level 3/8/2011   • Personal history of hydronephrosis    • Rhinitis, allergic 1/20/2010   • Vitamin d deficiency 3/8/2011       PSHx:   Past Surgical History:   Procedure Laterality Date   • ABDOMINAL HYSTERECTOMY TOTAL  2004    endometriosis and bleeding   • OOPHORECTOMY Bilateral 2004   • SALPINGECTOMY Bilateral 2004   • ABDOMINAL EXPLORATION       endometriosis   • CHOLECYSTECTOMY     • TONSILLECTOMY         Family Hx:   Family History   Problem Relation Age of Onset   • Arthritis Mother    • Heart Disease Mother    • Colon Cancer Father         70s   • Alcohol abuse Maternal Grandmother    • Suicide Attempts Maternal Grandmother          by suicide   • Bipolar disorder Maternal Grandmother    • Alcohol abuse Maternal Grandfather    • Brain Cancer Maternal Grandfather    • Alcohol abuse Paternal Grandmother    • Breast Cancer Paternal Grandmother          of breast CA   • Alcohol abuse Paternal Grandfather    • Heart Disease Paternal Grandfather    • Other Daughter         parotid tumor, vertigo   • Brain Cancer Son        Social Hx:  Social History     Socioeconomic History   • Marital status:      Spouse name: Not on file   • Number of children: 2   • Years of education: Not on file   • Highest education level: Not on file   Occupational History   • Not on file   Tobacco Use   • Smoking status: Never Smoker   • Smokeless tobacco: Never Used   Vaping Use   • Vaping Use: Never used   Substance and Sexual Activity   • Alcohol use: Yes     Comment: 1 glass of wine on occasion   • Drug use: Never   • Sexual activity: Yes     Partners: Male     Birth control/protection: Female Sterilization   Other Topics Concern   • Not on file   Social History Narrative    3 foster children currently.     Social Determinants of Health     Financial Resource Strain: Not on file   Food Insecurity: Not on file   Transportation Needs: Not on file   Physical Activity: Not on file   Stress: Not on file   Social Connections: Not on file   Intimate Partner Violence: Not on file   Housing Stability: Not on file       Allergies:  Allergies   Allergen Reactions   • Biaxin [Clarithromycin] Vomiting   • Latex        Medications: reviewed on epic.   No outpatient medications have been marked as taking for the 22 encounter (Appointment) with Na Veloz M.D..         Current Outpatient Medications on File Prior to Visit   Medication Sig Dispense Refill   • zolpidem (AMBIEN) 5 MG Tab TAKE 1 TABLET BY MOUTH AT BEDTIME AS NEEDED FOR SLEEP FOR UP TO 30 DAYS. 30 Tablet 0   • lithium carbonate, IR, 300 MG Tab tablet TAKE 1 TABLET BY MOUTH EVERYDAY AT BEDTIME 90 Tablet 0   • vitamin D2, Ergocalciferol, (DRISDOL) 1.25 MG (51672 UT) Cap capsule TAKE 1 CAPSULE BY MOUTH ONE TIME PER WEEK 12 Capsule 0   • ALPRAZolam (XANAX) 0.5 MG Tab Take 1 Tablet by mouth one time as needed (MRI) for up to 1 dose. 1 Tablet 0   • hydroCHLOROthiazide (HYDRODIURIL) 25 MG Tab TAKE 1 TABLET BY MOUTH EVERY DAY 30 Tablet 1   • tizanidine (ZANAFLEX) 4 MG Tab Take 0.5-1 Tablets by mouth at bedtime as needed (muscle spasms). 30 Tablet 2   • DUPIXENT 300 MG/2ML injection Inject 300 mg under the skin every 14 days.     • WIXELA INHUB 250-50 MCG/ACT AEROSOL POWDER, BREATH ACTIVATED TAKE 1 PUFF BY MOUTH EVERY 12 HOURS 60 Each 3   • atorvastatin (LIPITOR) 20 MG Tab Take 1 Tablet by mouth every day. 90 Tablet 3   • cyclobenzaprine (FLEXERIL) 10 mg Tab Take 1 Tablet by mouth every day. 90 Tablet 1   • promethazine (PHENERGAN) 25 MG Tab Take 1 Tablet by mouth every 6 hours as needed for Nausea/Vomiting. 30 Tablet 0   • ketoconazole (NIZORAL) 2 % Cream Apply 1 Application topically every day. 30 g 1   • calcipotriene (DOVONEX) 0.005 % Cream  (Patient not taking: Reported on 6/10/2022)     • Acetaminophen (TYLENOL PO) Take  by mouth.     • levalbuterol (XOPENEX HFA) 45 MCG/ACT inhaler Inhale 1-2 Puffs every four hours as needed for Shortness of Breath (asthma symptoms). 30 g 3   • celecoxib (CELEBREX) 200 MG Cap Take 200 mg by mouth every day.     • DULoxetine (CYMBALTA) 60 MG Cap DR Particles delayed-release capsule Take 120 mg by mouth every day.     • metaxalone (SKELAXIN) 800 MG Tab Take 800 mg by mouth every 12 hours as needed.     • propranolol CR (INDERAL LA) 80 MG CAPSULE SR 24 HR Take 80 mg by mouth every day.      • omeprazole (PRILOSEC) 40 MG delayed-release capsule Take 40 mg by mouth every day.       No current facility-administered medications on file prior to visit.         EXAMINATION     Physical Exam:   There were no vitals taken for this visit.    Constitutional:   Body Habitus: There is no height or weight on file to calculate BMI.  Cooperation: Fully cooperates with exam  Appearance: Well-groomed, well-nourished.    Eyes: No scleral icterus to suggest severe liver disease, no proptosis to suggest severe hyperthyroidism    ENT -no obvious auditory deficits, no noticeable facial droop     Skin -no rashes or lesions noted     Respiratory-  breathing comfortably on room air, no audible wheezing    Cardiovascular-distal extremities warm and well perfused.  No lower extremity edema is noted.     Gastrointestinal - no obvious abdominal masses, non-distended    Psychiatric- alert and oriented ×3. Normal affect.     Gait - no use of ambulatory device, nonantalgic. Gait notable for decreased hip flexion. previous exam: unable to heel walk with difficulty balancing.  Able to rise onto toes, however having difficulty balancing with toe walk.     Musculoskeletal and Neuro -        Thoracic/Lumbar Spine/Sacral Spine/Hips   Inspection: No evidence of atrophy in bilateral lower extremities throughout      There is limited active range of motion with lumbar extension     Facet loading maneuver pos on right, neg on left     Sensation intact to light touch at bilateral lower extremities     Tenderness to palpation over the right low back, no tenderness to palpation at left low back or bilateral sacroiliac joints     Lumbar spine /hip provocative exam maneuvers  Straight leg raise negative bilaterally  FADIR test negative bilaterally     SI joint tests  VIOLETTA test negative bilaterally  Thigh thrust test negative bilaterally     Motor Exam Lower Extremities  ? Myotome R L   Hip flexion L2 5 5   Knee extension L3 5 5   Ankle  dorsiflexion L4 5 5   Toe extension L5 5 5   Ankle plantarflexion S1 5 5         Previous exam  Cervical spine / upper extremities  Inspection: No deformities of the skin over the cervical spine. No rashes or lesions.     limited active range of motion in all directions.  Forward hunched posture with palpably increased muscular tension throughout posterior upper back.     Spurling's sign  negative bilaterally  Cervical facet loading maneuver  negative bilaterally     No signs of muscular atrophy in bilateral upper extremities      Tenderness to palpation at right paracervical muscle and bilateral upper trapezius.        Motor Exam Upper Extremities   ? Myotome R L   Shoulder abduction C5 5 5   Elbow flexion C5 5 5   Wrist extension C6 4+* 4+*   Elbow extension C7 5 5   Finger flexion C8 4- 4-   Finger abduction T1 4-^ 4-^   * giveway weakness  ^ Decreased ROM     Previous exam    Impaired sensation to light touch at anterior right upper trapezius, right dorsum of thumb, right lateral shoulder, right anterior wrist     Tinel's at the wrist over the median nerve negative bilaterally  Tinel's at the cubital tunnel: negative bilaterally    Reflexes  ?   R L   Biceps   2+ 2+   Brachioradialis   2+ 2+      Smith's sign positive right, negative left      ROM: limited active range of motion with lumbar flexion, lateral flexion, and rotation bilaterally.       Reflexes  ?   R L   Patella   2+ 2+   Achilles    2+ 2+      Clonus of the ankle positive bilaterally             MEDICAL DECISION MAKING    Medical records review: see under HPI section.     DATA    Labs: Personally reviewed at today's visit:     Lab Results   Component Value Date/Time    SODIUM 139 05/18/2022 08:40 AM    POTASSIUM 4.4 05/18/2022 08:40 AM    CHLORIDE 99 05/18/2022 08:40 AM    CO2 30 05/18/2022 08:40 AM    ANION 10.0 05/18/2022 08:40 AM    GLUCOSE 90 05/18/2022 08:40 AM    BUN 18 05/18/2022 08:40 AM    CREATININE 0.98 05/18/2022 08:40 AM     CREATININE 0.91 02/23/2011 09:27 AM    CALCIUM 9.6 05/18/2022 08:40 AM    ASTSGOT 34 12/10/2021 04:31 PM    ALTSGPT 37 12/10/2021 04:31 PM    TBILIRUBIN 0.3 12/10/2021 04:31 PM    ALBUMIN 3.74 (L) 05/18/2022 08:40 AM    TOTPROTEIN 7.0 05/18/2022 08:40 AM    GLOBULIN 2.7 12/10/2021 04:31 PM    AGRATIO 1.6 12/10/2021 04:31 PM       No results found for: PROTHROMBTM, INR     Lab Results   Component Value Date/Time    WBC 7.5 12/10/2021 04:31 PM    RBC 5.31 12/10/2021 04:31 PM    HEMOGLOBIN 15.3 12/10/2021 04:31 PM    HEMATOCRIT 45.8 12/10/2021 04:31 PM    MCV 86.3 12/10/2021 04:31 PM    MCH 28.8 12/10/2021 04:31 PM    MCHC 33.4 (L) 12/10/2021 04:31 PM    MPV 10.6 12/10/2021 04:31 PM    NEUTSPOLYS 52.40 12/10/2021 04:31 PM    LYMPHOCYTES 33.80 12/10/2021 04:31 PM    MONOCYTES 6.40 12/10/2021 04:31 PM    EOSINOPHILS 6.10 12/10/2021 04:31 PM    BASOPHILS 0.80 12/10/2021 04:31 PM        Lab Results   Component Value Date/Time    HBA1C 5.3 05/18/2022 08:40 AM        EMG/NCS 3/16/11 by Dr. Martinez       Imaging:   I personally reviewed following images, these are my reads  X-ray lumbar spine 12/10/2010  Normal alignment.  No evidence of significant facet arthropathy or disc space narrowing.  Mild sclerosis of bilateral sacroiliac joints     MRI cervical spine 3/7/11  Disc bulge at C3-4, C4-5, C5-6, C6-7 with Modic changes at C6-7.  Mild-moderate neuroforaminal stenosis at right C5-C6.  Very mild spinal canal stenosis at C4-5 and C6-7. Decreased cervical lordosis    MRI cervical spine 1/13/22  Severe right and mild left neuroforaminal stenosis at C4-C5, moderate to severe bilateral neuroforaminal stenosis at C5-C6. Facet arthropathy at C3-C4, C4-C5, and C5-C6. Mild central canal stenosis at C5-C6.      MRI lumbar spine 1/13/22  No significant neuroforaminal stenosis at any level. Facet arthropathy at bilateral L4-L5 and L5-S1. Central and right paracentral disc extrusion at L1-L2 that is not impinging on the adjacent nerve root  or central canal.    IMAGING radiology reads. I reviewed the following radiology reads       Results for orders placed during the hospital encounter of 01/26/22    MR-BRAIN-W/O    Impression  1.  10 mm x 7 mm x 7 mm notch-like defect in the posterior-inferior midline cerebellar vermis. This was not present on MRI cervical spine from 3/7/2011. The lesion is isointense with CSF and appears contiguous with cisterna magna. Differential  considerations would include development of an arachnoid cyst invaginating the cerebellar vermis versus macrocystic encephalomalacia extending to the vermian surface consistent with an old infarct or other remote cerebellar vermis insult. Uncertain  whether this may be related to the patient's unsteady gait.             Results for orders placed in visit on 01/13/22    MR-CERVICAL SPINE-W/O    Impression  Mild degenerative disease in the cervical spine as described above. When compared with the previous MRI dated 3/7/2011, there has been mild interval progression of the degenerative disease.      Results for orders placed in visit on 01/13/22    MR-LUMBAR SPINE-W/O    Impression  There is central and right paracentral disc extrusion at L1-2. The extruded disc fragment migrated upwards. There is mild effacement of the ventral subarachnoid space. There is no significant canal compromise.     MR-CERVICAL SPINE-W/O 03/07/11    FINDINGS:   The cervical spine maintains normal height and alignment.     At the level of C2-3, there is no spinal or neural foraminal stenosis.     At the level of C3-4, there is mild disk bulge causing mild effacement of   the anterior thecal sac.     At the level of C4-5, there is mild disk bulge causing mild spinal canal   stenosis.  There is flattening of the anterior spinal cord contour.     At the level of C5-6, there is mild disk bulge causing effacement of the   anterior thecal sac.  There is mild flattening of the anterior spinal   cord contour.     At the  level of C6-7, there is disk degeneration.  There is diffuse disk   bulge causing mild spinal canal stenosis.  There is flattening of the   anterior spinal cord contour.     At the level of C7-T1, there is no spinal or neural foraminal stenosis.     The visualized posterior fossa structures appear normal within limits.     The cervical spinal cord does not demonstrate any mass or abnormal T2   signal intensity.     The visualized pre-and paraspinal soft tissues appear normal within   limits.         IMPRESSION:   Mild degenerative changes in the cervical spine as described above.       XR lumbar spine 12/10/2010  FINDINGS:   The bony trabecular pattern is normal.  The lumbar vertebral bodies have   a normal height and alignment.  The disc spaces are well maintained and   symmetrical.  There is no evidence of spondylolisthesis, spondylolysis,   or osseous lesion.  The posterior elements are unremarkable.   Mildly sclerotic SI joints.         IMPRESSION:   Normal complete lumbar spine series.  Mildly sclerotic SI joints.      Diagnosis  {No diagnosis found. (Refresh or delete this SmartLink)}      ASSESSMENT AND PLAN:  Kasie Rodriguez  1975 is a female with history of rheumatoid arthritis followed by rheumatology Dr. Olivo, vitamin D deficiency, insomnia, anxiety, depression, lower extremity edema, IBS, BMI 40 who presents with ongoing right-sided low back pain.      She has a positive right facet loading maneuver and imaging findings of facet arthropathy at L4-L5 and L5-S1, suggestive of lumbar facetogenic pain. Negative SLR and Spurlings today.  Pain does not radiate past her mid glute, suggesting that nerve impingement is less likely to be a cause of her symptoms.  Additionally no significant nerve root impingement was seen on recent MRI lumbar spine 22.    She has being followed by neurology for symptoms including ongoing gait instability with MRI brain revealing cerebellar lesion.     There  are no diagnoses linked to this encounter.      The above note documents my personal evaluation of this patient. In addition, I have reviewed and confirmed with the patient and MA the supportive information documented in today's Patient Health Questionnaire and Office Note.       PLAN  Physical Therapy:   -Of note patient states she is currently doing physical therapy for urinary incontinence.   -Consider physical therapy for low back pain in the future     Diagnostic workup: MRI lumbar spine again reviewed today.     Medications:  -Prescribed tizanidine 2-4 mg nightly as needed today for worsening muscle spasms and worsening insomnia.  Counseled her that she should separate this from her other muscle relaxants including Flexeril and Skelaxin by at least 8 hours  - cont lyrica, Flexeril, Cymbalta, Skelaxin, celebrex  -Has tried low dose naltrexone 4.5mg daily given no relief  - consider taking tylenol as needed up to 4 grams per day, in divided doses at least 6 hours apart. Do not take more than 1 gram at a time.     Interventions:   -Patient states she would like to avoid diagnostic procedures such as lumbar medial branch blocks.  She would like to avoid an eventual radiofrequency ablation and is interested in therapeutic procedures at this time.  -Plan for intra-articular facet joint steroid injection at right L4-5 and L5-S1 with fluoroscopic guidance. The risks, benefits, and alternatives to this procedure were discussed and the patient wishes to proceed with the procedure. Risks include but are not limited to damage to surrounding structures, infection, bleeding, worsening of pain which can be permanent, and weakness which can be permanent. Benefits include pain relief and improved function. Alternatives include not doing the procedure.    - consider TPI PRN    Referrals: Patient previously declined referral for repeat EMG/NCS.  She reports she has had this twice in the past and was difficult to  tolerate.     Other:  - Continue follow-up with rheumatology Dr. Olivo     Outside records requested:  The patient previously signed outside records request form for her outside records including outside images. This includes the records from [place]    Follow-up: 1 month after procedure above    No orders of the defined types were placed in this encounter.      Na Veloz MD  Interventional Pain Management  Physical Medicine and Rehabilitation  [facility]    Please note that this dictation was created using voice recognition software. I have made every reasonable attempt to correct obvious errors, but I expect that there are errors of grammar and possibly content that I did not discover before finalizing the note.

## 2022-07-28 ENCOUNTER — APPOINTMENT (OUTPATIENT)
Dept: PHYSICAL MEDICINE AND REHAB | Facility: MEDICAL CENTER | Age: 47
End: 2022-07-28
Payer: COMMERCIAL

## 2022-08-02 ENCOUNTER — OFFICE VISIT (OUTPATIENT)
Dept: PHYSICAL MEDICINE AND REHAB | Facility: MEDICAL CENTER | Age: 47
End: 2022-08-02
Payer: COMMERCIAL

## 2022-08-02 VITALS
BODY MASS INDEX: 41.94 KG/M2 | TEMPERATURE: 96.4 F | OXYGEN SATURATION: 96 % | WEIGHT: 267.2 LBS | HEART RATE: 85 BPM | DIASTOLIC BLOOD PRESSURE: 84 MMHG | SYSTOLIC BLOOD PRESSURE: 132 MMHG | HEIGHT: 67 IN

## 2022-08-02 DIAGNOSIS — M54.16 LUMBAR RADICULOPATHY: ICD-10-CM

## 2022-08-02 DIAGNOSIS — G89.4 CHRONIC PAIN SYNDROME: ICD-10-CM

## 2022-08-02 DIAGNOSIS — R90.89 ABNORMAL BRAIN MRI: ICD-10-CM

## 2022-08-02 DIAGNOSIS — R20.0 NUMBNESS AND TINGLING: ICD-10-CM

## 2022-08-02 DIAGNOSIS — R26.89 BALANCE PROBLEM: ICD-10-CM

## 2022-08-02 DIAGNOSIS — R20.2 NUMBNESS AND TINGLING: ICD-10-CM

## 2022-08-02 DIAGNOSIS — M47.816 LUMBAR SPONDYLOSIS: ICD-10-CM

## 2022-08-02 DIAGNOSIS — M62.830 BACK MUSCLE SPASM: ICD-10-CM

## 2022-08-02 DIAGNOSIS — M70.61 GREATER TROCHANTERIC BURSITIS OF RIGHT HIP: ICD-10-CM

## 2022-08-02 DIAGNOSIS — M79.10 MYALGIA: ICD-10-CM

## 2022-08-02 DIAGNOSIS — R26.9 GAIT ABNORMALITY: ICD-10-CM

## 2022-08-02 DIAGNOSIS — M06.09 RHEUMATOID ARTHRITIS WITHOUT RHEUMATOID FACTOR, MULTIPLE SITES (HCC): ICD-10-CM

## 2022-08-02 DIAGNOSIS — M54.59 PAIN OF LUMBAR FACET JOINT: ICD-10-CM

## 2022-08-02 PROCEDURE — 99213 OFFICE O/P EST LOW 20 MIN: CPT | Performed by: STUDENT IN AN ORGANIZED HEALTH CARE EDUCATION/TRAINING PROGRAM

## 2022-08-02 RX ORDER — METAXALONE 800 MG/1
800 TABLET ORAL EVERY 12 HOURS PRN
Qty: 180 TABLET | Refills: 3 | Status: SHIPPED | OUTPATIENT
Start: 2022-08-02

## 2022-08-02 ASSESSMENT — PATIENT HEALTH QUESTIONNAIRE - PHQ9
5. POOR APPETITE OR OVEREATING: 0 - NOT AT ALL
CLINICAL INTERPRETATION OF PHQ2 SCORE: 1
SUM OF ALL RESPONSES TO PHQ QUESTIONS 1-9: 5

## 2022-08-02 ASSESSMENT — PAIN SCALES - GENERAL: PAINLEVEL: 8=MODERATE-SEVERE PAIN

## 2022-08-02 ASSESSMENT — FIBROSIS 4 INDEX: FIB4 SCORE: 1.44

## 2022-08-02 NOTE — PROGRESS NOTES
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 Low back pain is comparatively worse on average, but her neck pain is worse today.     She reports her pain is located at her right low back and radiates down her right leg.  She also endorses tingling in her anterior thighs bilaterally and lateral right distal calf.  She endorses right leg weakness and has fallen, most recently 3 months ago.  She states she first noticed numbness and tingling in her legs about 5 years ago but that has been progressively worsening.     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  She states this pain radiates into her bilateral hands (R>L) especially her right 2nd-4th fingers.  She also endorses pain, numbness, and tingling in her left thumb and second finger.  She states she has received 2 EMGs in the past.  EMG 2011 reviewed today indicates electrodiagnostic evidence of right sensory radial neuropathy but not median neuropathy or ulnar neuropathy on either side.  Cervical radiculopathy could not be assessed due to patient's difficulty tolerating the needle part of the study.       Of note she reports approximately 1 month of new symptoms including being off balance and nocturnal urinary incontinence.  She denies noticeable change in cognition.  Along with this she has developed breathing difficulty for which she has been referred to a pulmonologist by her PCP. She denies urinary incontinence during the day, bowel incontinence, or saddle anesthesia.       Her pain at its best-worse level during the course of the day is 5-8/10, respectively. Pain right now is 7/10 on the numeric pain scale. Pain worsens with standing, walking, side bending or twisting, walking upstairs and walking downstairs and improves with nothing. Her pain interferes somewhat with ADLs. The patient otherwise denies new weakness, numbness, or bladder/bowel incontinence. Endorses muscle spasms.      Also notices swelling in hands and calves. Sees rheumatology Dr. Olivo.     Limited in physical activity due to being short of breath.     The patient has done physical therapy for this problem for about 1 year. Notes improved posture with PT. Used to go to chiropractor which helped. Last time she went to chiropractor, 3 years ago, she had severe pain and weakness of right side lasting 2 days so she has not returned.     Patient has tried the following medications with varied success (current meds in bold):   Flexeril - takes once per day at night  cymbalta - primarily for depression. No noticeable relief in pain.  Skelaxin - takes once per day in afternoon. helps when lying still  lyrica - significant relief. Takes 200mg TID     Therapeutic modalities and interventional therapies to date include:  -pt reports having received TPI with no relief for pain level past 6/10  - cervical epidural steroid injection with 3-4 months relief at a time (last had years ago)  -Cervical medial branch blocks x1.  Pt states her insurance changed and couldn't proceed with treatment.   - has not had injections for low back    HPI  Today's visit:  Kasie Rodriguez ( 1975) is a female with Diagnoses of Lumbar spondylosis, Greater trochanteric bursitis of right hip, Chronic pain syndrome, Pain of lumbar facet joint, Myalgia, Numbness and tingling, Lumbar radiculopathy, Back muscle spasm, Abnormal brain MRI, Gait abnormality, Rheumatoid arthritis without rheumatoid factor, multiple sites (HCC), and Balance  problem were pertinent to this visit.    Kasie Rodriguez presents today with worsening right lateral hip pain that started 5 days ago with a trip to [place] involving a lot of walking. Notes that she had frequent muscle spasms and sensation of popping at her lateral right hip.     Denies changes in pain medications since last visit. Notes tizanidine didn't work but skelaxin continues to help her. Takes skelaxin 800mg BID with relief and no unwanted SE. Takes flexeril PRN at night.  Also takes Lyrica.    No longer doing PT for urinary incontinence. Did 1.5 months of it without relief    Also having continuous low back pain (R>L) .  Pain radiates slightly down her right low back but not past her mid glute.  She endorses ongoing painful spasms at her low back. Worse with lying down and better with sitting up. Denies known inciting event for worsening pain. Notes circumferential numbness/tingling distal from knees with standing.     Was originally scheduled for facet joint steroid injections but they were cost prohibitive.  Would like to consider lumbar medial branch blocks instead if these are more affordable.    Planning to f/u with neuro.    ROS:   Red Flags ROS:  Fever, Chills, Sweats: Denies  Involuntary Weight Loss: Denies  Bladder Incontinence: Denies  Bowel Incontinence: denies  Saddle Anesthesia: Denies    All other systems reviewed and negative.     PMHx:   Past Medical History:   Diagnosis Date   • Arthralgia of multiple joints 3/8/2011   • Arthritis    • ASTHMA 1/20/2010   • Elevated antinuclear antibody (JOSE C) level 3/8/2011   • Personal history of hydronephrosis    • Rhinitis, allergic 1/20/2010   • Vitamin d deficiency 3/8/2011       PSHx:   Past Surgical History:   Procedure Laterality Date   • ABDOMINAL HYSTERECTOMY TOTAL  2004    endometriosis and bleeding   • OOPHORECTOMY Bilateral 2004   • SALPINGECTOMY Bilateral 2004   • ABDOMINAL EXPLORATION      endometriosis   • CHOLECYSTECTOMY     •  TONSILLECTOMY         Family Hx:   Family History   Problem Relation Age of Onset   • Arthritis Mother    • Heart Disease Mother    • Colon Cancer Father         70s   • Alcohol abuse Maternal Grandmother    • Suicide Attempts Maternal Grandmother          by suicide   • Bipolar disorder Maternal Grandmother    • Alcohol abuse Maternal Grandfather    • Brain Cancer Maternal Grandfather    • Alcohol abuse Paternal Grandmother    • Breast Cancer Paternal Grandmother          of breast CA   • Alcohol abuse Paternal Grandfather    • Heart Disease Paternal Grandfather    • Other Daughter         parotid tumor, vertigo   • Brain Cancer Son        Social Hx:  Social History     Socioeconomic History   • Marital status:      Spouse name: Not on file   • Number of children: 2   • Years of education: Not on file   • Highest education level: Not on file   Occupational History   • Not on file   Tobacco Use   • Smoking status: Never Smoker   • Smokeless tobacco: Never Used   Vaping Use   • Vaping Use: Never used   Substance and Sexual Activity   • Alcohol use: Yes     Comment: 1 glass of wine on occasion   • Drug use: Never   • Sexual activity: Yes     Partners: Male     Birth control/protection: Female Sterilization   Other Topics Concern   • Not on file   Social History Narrative    3 foster children currently.     Social Determinants of Health     Financial Resource Strain: Not on file   Food Insecurity: Not on file   Transportation Needs: Not on file   Physical Activity: Not on file   Stress: Not on file   Social Connections: Not on file   Intimate Partner Violence: Not on file   Housing Stability: Not on file       Allergies:  Allergies   Allergen Reactions   • Biaxin [Clarithromycin] Vomiting   • Dupixent [Dupilumab] Hives   • Latex        Medications: reviewed on epic.   Outpatient Medications Marked as Taking for the 22 encounter (Office Visit) with Na Veloz M.D.   Medication Sig Dispense Refill    • metaxalone (SKELAXIN) 800 MG Tab Take 1 Tablet by mouth every 12 hours as needed for Moderate Pain or Severe Pain. 180 Tablet 3   • DULoxetine (CYMBALTA) 60 MG Cap DR Particles delayed-release capsule Take 2 Capsules by mouth every day. 30 Capsule 1   • zolpidem (AMBIEN) 5 MG Tab TAKE 1 TABLET BY MOUTH AT BEDTIME AS NEEDED FOR SLEEP FOR UP TO 30 DAYS. 30 Tablet 0   • lithium carbonate, IR, 300 MG Tab tablet TAKE 1 TABLET BY MOUTH EVERYDAY AT BEDTIME 90 Tablet 0   • vitamin D2, Ergocalciferol, (DRISDOL) 1.25 MG (05879 UT) Cap capsule TAKE 1 CAPSULE BY MOUTH ONE TIME PER WEEK 12 Capsule 0   • ALPRAZolam (XANAX) 0.5 MG Tab Take 1 Tablet by mouth one time as needed (MRI) for up to 1 dose. 1 Tablet 0   • hydroCHLOROthiazide (HYDRODIURIL) 25 MG Tab TAKE 1 TABLET BY MOUTH EVERY DAY 30 Tablet 1   • WIXELA INHUB 250-50 MCG/ACT AEROSOL POWDER, BREATH ACTIVATED TAKE 1 PUFF BY MOUTH EVERY 12 HOURS 60 Each 3   • atorvastatin (LIPITOR) 20 MG Tab Take 1 Tablet by mouth every day. 90 Tablet 3   • cyclobenzaprine (FLEXERIL) 10 mg Tab Take 1 Tablet by mouth every day. 90 Tablet 1   • promethazine (PHENERGAN) 25 MG Tab Take 1 Tablet by mouth every 6 hours as needed for Nausea/Vomiting. 30 Tablet 0   • ketoconazole (NIZORAL) 2 % Cream Apply 1 Application topically every day. 30 g 1   • Acetaminophen (TYLENOL PO) Take  by mouth.     • levalbuterol (XOPENEX HFA) 45 MCG/ACT inhaler Inhale 1-2 Puffs every four hours as needed for Shortness of Breath (asthma symptoms). 30 g 3   • celecoxib (CELEBREX) 200 MG Cap Take 200 mg by mouth every day.     • propranolol CR (INDERAL LA) 80 MG CAPSULE SR 24 HR Take 80 mg by mouth every day.     • omeprazole (PRILOSEC) 40 MG delayed-release capsule Take 40 mg by mouth every day.          Current Outpatient Medications on File Prior to Visit   Medication Sig Dispense Refill   • DULoxetine (CYMBALTA) 60 MG Cap DR Particles delayed-release capsule Take 2 Capsules by mouth every day. 30 Capsule 1   •  zolpidem (AMBIEN) 5 MG Tab TAKE 1 TABLET BY MOUTH AT BEDTIME AS NEEDED FOR SLEEP FOR UP TO 30 DAYS. 30 Tablet 0   • lithium carbonate, IR, 300 MG Tab tablet TAKE 1 TABLET BY MOUTH EVERYDAY AT BEDTIME 90 Tablet 0   • vitamin D2, Ergocalciferol, (DRISDOL) 1.25 MG (04597 UT) Cap capsule TAKE 1 CAPSULE BY MOUTH ONE TIME PER WEEK 12 Capsule 0   • ALPRAZolam (XANAX) 0.5 MG Tab Take 1 Tablet by mouth one time as needed (MRI) for up to 1 dose. 1 Tablet 0   • hydroCHLOROthiazide (HYDRODIURIL) 25 MG Tab TAKE 1 TABLET BY MOUTH EVERY DAY 30 Tablet 1   • WIXELA INHUB 250-50 MCG/ACT AEROSOL POWDER, BREATH ACTIVATED TAKE 1 PUFF BY MOUTH EVERY 12 HOURS 60 Each 3   • atorvastatin (LIPITOR) 20 MG Tab Take 1 Tablet by mouth every day. 90 Tablet 3   • cyclobenzaprine (FLEXERIL) 10 mg Tab Take 1 Tablet by mouth every day. 90 Tablet 1   • promethazine (PHENERGAN) 25 MG Tab Take 1 Tablet by mouth every 6 hours as needed for Nausea/Vomiting. 30 Tablet 0   • ketoconazole (NIZORAL) 2 % Cream Apply 1 Application topically every day. 30 g 1   • Acetaminophen (TYLENOL PO) Take  by mouth.     • levalbuterol (XOPENEX HFA) 45 MCG/ACT inhaler Inhale 1-2 Puffs every four hours as needed for Shortness of Breath (asthma symptoms). 30 g 3   • celecoxib (CELEBREX) 200 MG Cap Take 200 mg by mouth every day.     • propranolol CR (INDERAL LA) 80 MG CAPSULE SR 24 HR Take 80 mg by mouth every day.     • omeprazole (PRILOSEC) 40 MG delayed-release capsule Take 40 mg by mouth every day.     • DUPIXENT 300 MG/2ML injection Inject 300 mg under the skin every 14 days. (Patient not taking: Reported on 8/2/2022)     • calcipotriene (DOVONEX) 0.005 % Cream  (Patient not taking: No sig reported)       No current facility-administered medications on file prior to visit.         EXAMINATION     Physical Exam:   /84 (BP Location: Right arm, Patient Position: Sitting, BP Cuff Size: Adult long)   Pulse 85   Temp (!) 35.8 °C (96.4 °F) (Temporal)   Ht 1.702 m (5'  "7\")   Wt 121 kg (267 lb 3.2 oz)   SpO2 96%     Constitutional:   Body Habitus: Body mass index is 41.85 kg/m².  Cooperation: Fully cooperates with exam  Appearance: Well-groomed, well-nourished.    Eyes: No scleral icterus to suggest severe liver disease, no proptosis to suggest severe hyperthyroidism    ENT -no obvious auditory deficits, no noticeable facial droop     Skin -no rashes or lesions noted     Respiratory-  breathing comfortably on room air, no audible wheezing    Cardiovascular-distal extremities warm and well perfused.  No lower extremity edema is noted.     Gastrointestinal - no obvious abdominal masses, non-distended    Psychiatric- alert and oriented ×3. Normal affect.     Gait - no use of ambulatory device, nonantalgic. Gait notable for decreased hip flexion. previous exam: unable to heel walk with difficulty jamila `gilda.  Able to rise onto toes, however having difficulty balancing with toe walk.     Musculoskeletal and Neuro -        Thoracic/Lumbar Spine/Sacral Spine/Hips   Inspection: No evidence of atrophy in bilateral lower extremities throughout     There is limited active range of motion with lumbar extension     Facet loading maneuver pos on right, neg on left     Sensation intact to light touch at bilateral lower extremities while supine.     Tenderness to palpation over the right greater trochanter and bilateral glutes.  No tenderness to palpation over bilateral lumbar facets.     Lumbar spine /hip provocative exam maneuvers  Straight leg raise negative bilaterally  FADIR test negative bilaterally but on right worsens right greater trochanteric pain     SI joint tests  VIOLETTA test negative bilaterally but on right worsens right greater trochanteric pain       Motor Exam Lower Extremities  ? Myotome R L   Hip flexion L2 4* 5   Knee extension L3 5 5   Ankle dorsiflexion L4 5 5   Toe extension L5 5 5   Ankle plantarflexion S1 5 5    *Pain limited     Previous exam  Cervical spine / upper " extremities  Inspection: No deformities of the skin over the cervical spine. No rashes or lesions.     limited active range of motion in all directions.  Forward hunched posture with palpably increased muscular tension throughout posterior upper back.     Spurling's sign  negative bilaterally  Cervical facet loading maneuver  negative bilaterally     No signs of muscular atrophy in bilateral upper extremities      Tenderness to palpation at right paracervical muscle and bilateral upper trapezius.        Motor Exam Upper Extremities   ? Myotome R L   Shoulder abduction C5 5 5   Elbow flexion C5 5 5   Wrist extension C6 4+* 4+*   Elbow extension C7 5 5   Finger flexion C8 4- 4-   Finger abduction T1 4-^ 4-^   * giveway weakness  ^ Decreased ROM     Previous exam    Impaired sensation to light touch at anterior right upper trapezius, right dorsum of thumb, right lateral shoulder, right anterior wrist     Tinel's at the wrist over the median nerve negative bilaterally  Tinel's at the cubital tunnel: negative bilaterally    Reflexes  ?   R L   Biceps   2+ 2+   Brachioradialis   2+ 2+      Smith's sign positive right, negative left      ROM: limited active range of motion with lumbar flexion, lateral flexion, and rotation bilaterally.       Reflexes  ?   R L   Patella   2+ 2+   Achilles    2+ 2+      Clonus of the ankle positive bilaterally             MEDICAL DECISION MAKING    Medical records review: see under HPI section.     DATA    Labs: Personally reviewed at today's visit:     Lab Results   Component Value Date/Time    SODIUM 139 05/18/2022 08:40 AM    POTASSIUM 4.4 05/18/2022 08:40 AM    CHLORIDE 99 05/18/2022 08:40 AM    CO2 30 05/18/2022 08:40 AM    ANION 10.0 05/18/2022 08:40 AM    GLUCOSE 90 05/18/2022 08:40 AM    BUN 18 05/18/2022 08:40 AM    CREATININE 0.98 05/18/2022 08:40 AM    CREATININE 0.91 02/23/2011 09:27 AM    CALCIUM 9.6 05/18/2022 08:40 AM    ASTSGOT 34 12/10/2021 04:31 PM    ALTSGPT 37  12/10/2021 04:31 PM    TBILIRUBIN 0.3 12/10/2021 04:31 PM    ALBUMIN 3.74 (L) 05/18/2022 08:40 AM    TOTPROTEIN 7.0 05/18/2022 08:40 AM    GLOBULIN 2.7 12/10/2021 04:31 PM    AGRATIO 1.6 12/10/2021 04:31 PM       No results found for: PROTHROMBTM, INR     Lab Results   Component Value Date/Time    WBC 7.5 12/10/2021 04:31 PM    RBC 5.31 12/10/2021 04:31 PM    HEMOGLOBIN 15.3 12/10/2021 04:31 PM    HEMATOCRIT 45.8 12/10/2021 04:31 PM    MCV 86.3 12/10/2021 04:31 PM    MCH 28.8 12/10/2021 04:31 PM    MCHC 33.4 (L) 12/10/2021 04:31 PM    MPV 10.6 12/10/2021 04:31 PM    NEUTSPOLYS 52.40 12/10/2021 04:31 PM    LYMPHOCYTES 33.80 12/10/2021 04:31 PM    MONOCYTES 6.40 12/10/2021 04:31 PM    EOSINOPHILS 6.10 12/10/2021 04:31 PM    BASOPHILS 0.80 12/10/2021 04:31 PM        Lab Results   Component Value Date/Time    HBA1C 5.3 05/18/2022 08:40 AM        EMG/NCS 3/16/11 by Dr. Martinez       Imaging:   I personally reviewed following images, these are my reads  X-ray lumbar spine 12/10/2010  Normal alignment.  No evidence of significant facet arthropathy or disc space narrowing.  Mild sclerosis of bilateral sacroiliac joints     MRI cervical spine 3/7/11  Disc bulge at C3-4, C4-5, C5-6, C6-7 with Modic changes at C6-7.  Mild-moderate neuroforaminal stenosis at right C5-C6.  Very mild spinal canal stenosis at C4-5 and C6-7. Decreased cervical lordosis    MRI cervical spine 1/13/22  Severe right and mild left neuroforaminal stenosis at C4-C5, moderate to severe bilateral neuroforaminal stenosis at C5-C6. Facet arthropathy at C3-C4, C4-C5, and C5-C6. Mild central canal stenosis at C5-C6.      MRI lumbar spine 1/13/22  No significant neuroforaminal stenosis at any level. Facet arthropathy at bilateral L4-L5 and L5-S1. Central and right paracentral disc extrusion at L1-L2 that is not impinging on the adjacent nerve root or central canal.    IMAGING radiology reads. I reviewed the following radiology reads       Results for orders placed  during the hospital encounter of 01/26/22    MR-BRAIN-W/O    Impression  1.  10 mm x 7 mm x 7 mm notch-like defect in the posterior-inferior midline cerebellar vermis. This was not present on MRI cervical spine from 3/7/2011. The lesion is isointense with CSF and appears contiguous with cisterna magna. Differential  considerations would include development of an arachnoid cyst invaginating the cerebellar vermis versus macrocystic encephalomalacia extending to the vermian surface consistent with an old infarct or other remote cerebellar vermis insult. Uncertain  whether this may be related to the patient's unsteady gait.             Results for orders placed in visit on 01/13/22    MR-CERVICAL SPINE-W/O    Impression  Mild degenerative disease in the cervical spine as described above. When compared with the previous MRI dated 3/7/2011, there has been mild interval progression of the degenerative disease.      Results for orders placed in visit on 01/13/22    MR-LUMBAR SPINE-W/O    Impression  There is central and right paracentral disc extrusion at L1-2. The extruded disc fragment migrated upwards. There is mild effacement of the ventral subarachnoid space. There is no significant canal compromise.     MR-CERVICAL SPINE-W/O 03/07/11    FINDINGS:   The cervical spine maintains normal height and alignment.     At the level of C2-3, there is no spinal or neural foraminal stenosis.     At the level of C3-4, there is mild disk bulge causing mild effacement of   the anterior thecal sac.     At the level of C4-5, there is mild disk bulge causing mild spinal canal   stenosis.  There is flattening of the anterior spinal cord contour.     At the level of C5-6, there is mild disk bulge causing effacement of the   anterior thecal sac.  There is mild flattening of the anterior spinal   cord contour.     At the level of C6-7, there is disk degeneration.  There is diffuse disk   bulge causing mild spinal canal stenosis.  There is  flattening of the   anterior spinal cord contour.     At the level of C7-T1, there is no spinal or neural foraminal stenosis.     The visualized posterior fossa structures appear normal within limits.     The cervical spinal cord does not demonstrate any mass or abnormal T2   signal intensity.     The visualized pre-and paraspinal soft tissues appear normal within   limits.         IMPRESSION:   Mild degenerative changes in the cervical spine as described above.       XR lumbar spine 12/10/2010  FINDINGS:   The bony trabecular pattern is normal.  The lumbar vertebral bodies have   a normal height and alignment.  The disc spaces are well maintained and   symmetrical.  There is no evidence of spondylolisthesis, spondylolysis,   or osseous lesion.  The posterior elements are unremarkable.   Mildly sclerotic SI joints.         IMPRESSION:   Normal complete lumbar spine series.  Mildly sclerotic SI joints.      Diagnosis  Visit Diagnoses     ICD-10-CM   1. Lumbar spondylosis  M47.816   2. Greater trochanteric bursitis of right hip  M70.61   3. Chronic pain syndrome  G89.4   4. Pain of lumbar facet joint  M54.59   5. Myalgia  M79.10   6. Numbness and tingling  R20.0    R20.2   7. Lumbar radiculopathy  M54.16   8. Back muscle spasm  M62.830   9. Abnormal brain MRI  R90.89   10. Gait abnormality  R26.9   11. Rheumatoid arthritis without rheumatoid factor, multiple sites (HCC)  M06.09   12. Balance problem  R26.89         ASSESSMENT AND PLAN:  Kasie Rodriguez  1975 is a female with history of rheumatoid arthritis followed by rheumatology Dr. Olivo, vitamin D deficiency, insomnia, anxiety, depression, lower extremity edema, IBS, BMI 40 who presents with ongoing right-sided low back pain and new pain at her right greater trochanter.    She has a positive right facet loading maneuver and imaging findings of facet arthropathy at L4-L5 and L5-S1, suggestive of lumbar facetogenic pain. Pain does not radiate past her  mid glute, suggesting that nerve impingement is less likely to be a cause of her symptoms.  Additionally no significant nerve root impingement was seen on recent MRI lumbar spine 1/13/22.    She has being followed by neurology for symptoms including ongoing gait instability with MRI brain revealing cerebellar lesion.     Kasie was seen today for follow-up.    Diagnoses and all orders for this visit:    Lumbar spondylosis  -     Referral to Pain Clinic    Greater trochanteric bursitis of right hip  -     Referral to Pain Clinic    Chronic pain syndrome    Pain of lumbar facet joint    Myalgia    Numbness and tingling    Lumbar radiculopathy    Back muscle spasm    Abnormal brain MRI    Gait abnormality    Rheumatoid arthritis without rheumatoid factor, multiple sites (HCC)    Balance problem    Other orders  -     metaxalone (SKELAXIN) 800 MG Tab; Take 1 Tablet by mouth every 12 hours as needed for Moderate Pain or Severe Pain.          The above note documents my personal evaluation of this patient. In addition, I have reviewed and confirmed with the patient and MA the supportive information documented in today's Patient Health Questionnaire and Office Note.       PLAN  Physical Therapy:   - discussed possible PT referral. Pt declined at this time in favor of medications and injections first.     Diagnostic workup: no new imaging needed at this time.     Medications:  - cont lyrica, Flexeril, Cymbalta, Skelaxin, celebrex  - s/p tizanidine with no significant improvement  - s/p low dose naltrexone 4.5mg daily given no relief  - consider taking tylenol as needed up to 4 grams per day, in divided doses at least 6 hours apart. Do not take more than 1 gram at a time.     Interventions:   - right greater trochanteric bursa steroid injection under ultrasound guidance. The risks, benefits, and alternatives to this procedure were discussed and the patient wishes to proceed with the procedure. Risks include but are not  limited to damage to surrounding structures, infection, bleeding, worsening of pain which can be permanent, and weakness which can be permanent. Benefits include pain relief and improved function. Alternatives include not doing the procedure.    -previously planned for intra-articular facet joint steroid injection at right L4-5 and L5-S1 with fluoroscopic guidance which were cost prohibitive  - plan for diagnostic lumbar medial branch blocks targeting Right L4-L5 and L5-S1 facet joints if these are more affordable - order placed today so patient may assess cost. The risks, benefits, and alternatives to this procedure were discussed and the patient wishes to proceed with the procedure. Risks include but are not limited to damage to surrounding structures, infection, bleeding, worsening of pain which can be permanent, and weakness which can be permanent. Benefits include pain relief and improved function. Alternatives include not doing the procedure.    - consider TPI PRN    Referrals: Patient previously declined referral for repeat EMG/NCS.  She reports she has had this twice in the past and was difficult to tolerate.     Other:  - Continue follow-up with rheumatology Dr. Olivo     Outside records requested:  The patient previously signed outside records request form for her outside records including outside images. This includes the records from [place]    Follow-up: next available for procedure above    Orders Placed This Encounter   • Referral to Pain Clinic   • Referral to Pain Clinic   • metaxalone (SKELAXIN) 800 MG Tab       Na Veloz MD  Interventional Pain Management  Physical Medicine and Rehabilitation  [facility]    Please note that this dictation was created using voice recognition software. I have made every reasonable attempt to correct obvious errors, but I expect that there are errors of grammar and possibly content that I did not discover before finalizing the note.

## 2022-08-04 ENCOUNTER — OFFICE VISIT (OUTPATIENT)
Dept: PHYSICAL MEDICINE AND REHAB | Facility: MEDICAL CENTER | Age: 47
End: 2022-08-04
Payer: COMMERCIAL

## 2022-08-04 VITALS
HEIGHT: 67 IN | WEIGHT: 267.64 LBS | OXYGEN SATURATION: 99 % | HEART RATE: 99 BPM | DIASTOLIC BLOOD PRESSURE: 78 MMHG | BODY MASS INDEX: 42.01 KG/M2 | SYSTOLIC BLOOD PRESSURE: 130 MMHG | TEMPERATURE: 97.8 F

## 2022-08-04 DIAGNOSIS — M54.59 PAIN OF LUMBAR FACET JOINT: ICD-10-CM

## 2022-08-04 DIAGNOSIS — G89.4 CHRONIC PAIN SYNDROME: ICD-10-CM

## 2022-08-04 DIAGNOSIS — M62.830 BACK MUSCLE SPASM: ICD-10-CM

## 2022-08-04 DIAGNOSIS — M47.816 LUMBAR SPONDYLOSIS: ICD-10-CM

## 2022-08-04 DIAGNOSIS — M79.10 MYALGIA: ICD-10-CM

## 2022-08-04 DIAGNOSIS — M54.16 LUMBAR RADICULOPATHY: ICD-10-CM

## 2022-08-04 DIAGNOSIS — M70.61 GREATER TROCHANTERIC BURSITIS OF RIGHT HIP: Primary | ICD-10-CM

## 2022-08-04 DIAGNOSIS — R26.89 BALANCE PROBLEM: ICD-10-CM

## 2022-08-04 DIAGNOSIS — M06.09 RHEUMATOID ARTHRITIS WITHOUT RHEUMATOID FACTOR, MULTIPLE SITES (HCC): ICD-10-CM

## 2022-08-04 DIAGNOSIS — R26.9 GAIT ABNORMALITY: ICD-10-CM

## 2022-08-04 PROCEDURE — 20552 NJX 1/MLT TRIGGER POINT 1/2: CPT | Mod: 59 | Performed by: STUDENT IN AN ORGANIZED HEALTH CARE EDUCATION/TRAINING PROGRAM

## 2022-08-04 PROCEDURE — 20611 DRAIN/INJ JOINT/BURSA W/US: CPT | Mod: RT | Performed by: STUDENT IN AN ORGANIZED HEALTH CARE EDUCATION/TRAINING PROGRAM

## 2022-08-04 ASSESSMENT — PATIENT HEALTH QUESTIONNAIRE - PHQ9
5. POOR APPETITE OR OVEREATING: 0 - NOT AT ALL
CLINICAL INTERPRETATION OF PHQ2 SCORE: 2
SUM OF ALL RESPONSES TO PHQ QUESTIONS 1-9: 4

## 2022-08-04 ASSESSMENT — PAIN SCALES - GENERAL: PAINLEVEL: 7=MODERATE-SEVERE PAIN

## 2022-08-04 ASSESSMENT — FIBROSIS 4 INDEX: FIB4 SCORE: 1.44

## 2022-08-04 NOTE — PROCEDURES
Patient Name: Kasie Rodriguez  : 1975  Date of Service: 2022    Physician/s: Na Veloz MD    Pre-operative Diagnosis: right greater trochanteric bursitis, myalgia    Post-operative Diagnosis: right greater trochanteric bursitis, myalgia    Procedure: right greater trochanteric bursa injection ultrasound-guided, trigger point injection    Description of procedure:    The risks, benefits, and alternatives of the procedure were reviewed and discussed with the patient.  Written informed consent was freely obtained. A pre-procedural time-out was conducted by the physician verifying patient’s identity, procedure to be performed, procedure site and side, and allergy verification. Appropriate equipment was determined to be in place for the procedure.     No sedation was used for this procedure.     In the office suite the patient was placed in a lateral position with the right side up, and the skin was prepped and draped in the usual sterile fashion. A trigger point was identified in the right gluteus mady muscle. The ultrasound probe was placed over this area and the target for injection was marked. The subcutaneous tissues were anesthetized with 1.5cc of 1% lidocaine. A 25g 3.5 inch needle was placed into skin and advanced under ultrasound guidance into the muscle. Following negative aspiration, approx 1.5mL of a mixture containing 1% lidocaine with 1mL of 40mg/mL methylprednisolone was then injected into this trigger point, and the needle was subsequently removed intact after being restyleted.    The ultrasound probe was placed over the right greater trochanter and the target for injection was marked. The subcutaneous tissues were anesthetized with 1.5cc of 1% lidocaine. Following negative aspiration, approx 1.5mL of a mixture containing 1% lidocaine with 1mL of 40mg/mL methylprednisolone was then injected into the greater trochanteric bursa, and the needle was subsequently removed intact after  being restyleted.    The patient's hip was wiped with a 4x4 gauze, the area was cleansed with alcohol prep, and a bandaid was applied. There were no complications noted. The images were saved for permanent storage.     Na Veloz MD  Interventional Pain and Spine  Physical Medicine and Rehabilitation  [facility]

## 2022-08-15 NOTE — PROGRESS NOTES
Chief Complaint   Patient presents with    Follow-Up     Memory Disorder       History of present illness:  Kasie Rodriguez 46 y.o. female presents today for abnormal brain MRI f/u.  History is obtained from patient.  and Patient is accompanied by self. Previously came with  [facility].  Occupation: nurse    Duration/timing: ~10/2021  Context:   Abnormal MRI brain: Patient had an MRI of the brain and spine performed after having symptoms starting in October 2021 when she went to [place].  After arriving by the second day she suffered a bad respiratory infection suspected viral producing much sputum, shortness of breath, fevers, diarrhea with a total duration of 10 days.  She did not officially test to see if she had COVID.  Nobody else in the family got sick.  When the trip was over she drove back from [place] to [place] in a total of 3 days and experienced swelling her legs.  Other symptoms included cognitive fog, difficult speaking when she is frustrated, trouble finding words, imbalance.  Recently, she states she has good days and bad days, uncertain causes of edema in the legs.  Left arm feels week. Right handed.  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 Chronic anxiety and depression. Seizure as a child on dilantin - thought to be febrile.   Denies: vision changes/loss or diplopia, other weakness, depression, anxiety, hearing loss, loss of consciousness and hallucinations, hx of EtOH abuse, bowel incontinence,  saddle anesthesia.    Patient has tried:  -Maxalt - 10mg PRN with good abortive benefit      Subjective: Patient was last seen in neurology clinic on 4/2022.     Imbalance: she came back from [facility] and she had leg issues - pain weakness, right leg. End July 2022. She is using a cane now. She had injections with  Dr. Guerrero - pain management. Pending PT/OT. She has period of standing and feels like her legs will go out from under her. She does not want to do the EMG/NCS again. No falls.     Cognition: she feels like it is about the same.    Tremors: she has had tremors since child. It was affecting all 4 extremities. Sometimes hard to get her extremity to stay still. Worse later in the day. Usually one arm of one leg sometimes the other side. No triggers. Does not affect her chin or head. Duration of about seconds.     Migraines: Chronic history of migraines.  Unilateral bilateral, sharp stabbing quality.  Moderate to severe.  Duration of 24 hours.  Associated with phonophobia photophobia.  She reports aura.  Frequency four migraines a month.      Past medical history:   Past Medical History:   Diagnosis Date    Arthralgia of multiple joints 3/8/2011    Arthritis     ASTHMA 2010    Elevated antinuclear antibody (JOSE C) level 3/8/2011    Personal history of hydronephrosis     Rhinitis, allergic 2010    Vitamin d deficiency 3/8/2011       Past surgical history:   Past Surgical History:   Procedure Laterality Date    ABDOMINAL HYSTERECTOMY TOTAL      endometriosis and bleeding    OOPHORECTOMY Bilateral 2004    SALPINGECTOMY Bilateral     ABDOMINAL EXPLORATION      endometriosis    CHOLECYSTECTOMY      TONSILLECTOMY         Family history:   Family History   Problem Relation Age of Onset    Arthritis Mother     Heart Disease Mother     Colon Cancer Father         70s    Alcohol abuse Maternal Grandmother     Suicide Attempts Maternal Grandmother          by suicide    Bipolar disorder Maternal Grandmother     Alcohol abuse Maternal Grandfather     Brain Cancer Maternal Grandfather     Alcohol abuse Paternal Grandmother     Breast Cancer Paternal Grandmother          of breast CA    Alcohol abuse Paternal Grandfather     Heart Disease Paternal Grandfather     Other Daughter         parotid tumor, vertigo     "Brain Cancer Son        Social history:   Tobacco Use    Smoking status: Never Smoker    Smokeless tobacco: Never Used   Vaping Use    Vaping Use: Never used   Substance and Sexual Activity    Alcohol use: Yes     Comment: 1 glass of wine on occasion    Drug use: Never     Current medications:   Current Outpatient Medications   Medication    zolpidem (AMBIEN) 5 MG Tab    RINVOQ 15 MG TABLET SR 24 HR    rizatriptan (MAXALT) 10 MG tablet    metaxalone (SKELAXIN) 800 MG Tab    DULoxetine (CYMBALTA) 60 MG Cap DR Particles delayed-release capsule    lithium carbonate, IR, 300 MG Tab tablet    vitamin D2, Ergocalciferol, (DRISDOL) 1.25 MG (36846 UT) Cap capsule    hydroCHLOROthiazide (HYDRODIURIL) 25 MG Tab    WIXELA INHUB 250-50 MCG/ACT AEROSOL POWDER, BREATH ACTIVATED    atorvastatin (LIPITOR) 20 MG Tab    cyclobenzaprine (FLEXERIL) 10 mg Tab    promethazine (PHENERGAN) 25 MG Tab    Acetaminophen (TYLENOL PO)    levalbuterol (XOPENEX HFA) 45 MCG/ACT inhaler    celecoxib (CELEBREX) 200 MG Cap    propranolol CR (INDERAL LA) 80 MG CAPSULE SR 24 HR    omeprazole (PRILOSEC) 40 MG delayed-release capsule     No current facility-administered medications for this visit.       Medication Allergy:  Allergies   Allergen Reactions    Biaxin [Clarithromycin] Vomiting    Dupixent [Dupilumab] Hives    Latex        ROS - per HPI    Physical examination:   Vitals:    08/16/22 1533   BP: 134/80   BP Location: Right arm   Patient Position: Sitting   BP Cuff Size: Adult   Pulse: 63   Temp: 36.3 °C (97.3 °F)   TempSrc: Temporal   SpO2: 93%   Weight: (!) 131 kg (288 lb 2.3 oz)   Height: 1.702 m (5' 7\")     General: Patient in well nourished in no apparent distress. Elevated BMI.  HENT: Normocephalic, atraumatic.   Cardiovascular: No significant lower extremity edema.  Respiratory: Normal respiratory effort.   Psychiatric: Pleasant.  Mildly reduced affect.    NEUROLOGICAL EXAM:   Mental status: Awake, alert and fully oriented to person, " place, time and situation. Normal attention, concentration and fund of knowledge for education level. MoCA 26/30 (8/2022)  Speech and language: Speech is fluent without errors and clear.  Cranial nerve exam:  II: Pupils are equally round and reactive to light. Visual fields are intact by confrontation.  III, IV, VI: EOMI - except patient cannot follow my finger on left gaze, no diplopia, no ptosis. She can voluntarily look left.  Prior  V: Sensation to light touch is normal over V1-3 distributions bilaterally.    VII: Facial movements are symmetrical. There is no facial droop.   VIII: Hearing intact to soft speech and finger rub bilaterally  IX: Palate elevates symmetrically, uvula is midline. Dysarthria is not present.  XI: Shoulder shrug are symmetrical and strong.   XII: Tongue protrudes midline.    Motor exam:  Increased tone in the legs. Normal in the arms. Rapid finger tapping of good amplitude and speed.  Spiral scanned - 8/2022        Muscle strength: Prior exam as below, today strength is essentially 5/5 in the bilateral upper and lower extremities     Right  Left  Deltoid   5/5  5/5        Biceps   5/5  5/5  Triceps  5/5  Effort?/5   encouragement   Wrist extensors 5/5  5/5  Wrist flexors  5/5  5/5     5/5  5/5  Interossei  5/5  5/5  Hip flexors  5/5  Effort/5   Quadriceps  5/5  5/5   Hamstrings  5/5  effort/5  Dorsiflexors  5/5  5/5  Plantarflexors  5/5  5/5  Foot inversion  5/5  effort/5  Foot eversion  5/5  effort/5  NT = not tested    Sensory exam:  Intact to Light touch and Temperature in bilateral upper and lower extremity.  Vibration 10 seconds on the right hallux, 12 seconds on left.    Reflexes: Stable reflexes     Right  Left  Biceps   1/4  1/4  Triceps  0/4  0/4  Brachioradialis 0/4  0/4  Knee jerk  3/4  3/4  Ankle jerk  4/4  4/4 Clonus 4 beats  bilateral toes are downgoing to plantar stimulation.    Coordination: shows a normal finger-nose-finger. No dysdiedokinesia bilateral upper  extremities.  Gait: cautious, looks at ground, shuffles, cane right       ANCILLARY DATA REVIEWED:   Lab Data Review:  Lab Results   Component Value Date/Time    WBC 7.5 12/10/2021 04:31 PM    RBC 5.31 12/10/2021 04:31 PM    HEMOGLOBIN 15.3 12/10/2021 04:31 PM    HEMATOCRIT 45.8 12/10/2021 04:31 PM    MCV 86.3 12/10/2021 04:31 PM    MCH 28.8 12/10/2021 04:31 PM    MCHC 33.4 (L) 12/10/2021 04:31 PM    MPV 10.6 12/10/2021 04:31 PM    NEUTSPOLYS 52.40 12/10/2021 04:31 PM    LYMPHOCYTES 33.80 12/10/2021 04:31 PM    MONOCYTES 6.40 12/10/2021 04:31 PM    EOSINOPHILS 6.10 12/10/2021 04:31 PM    BASOPHILS 0.80 12/10/2021 04:31 PM      Lab Results   Component Value Date/Time    SODIUM 139 05/18/2022 08:40 AM    POTASSIUM 4.4 05/18/2022 08:40 AM    CHLORIDE 99 05/18/2022 08:40 AM    CO2 30 05/18/2022 08:40 AM    GLUCOSE 90 05/18/2022 08:40 AM    BUN 18 05/18/2022 08:40 AM    CREATININE 0.98 05/18/2022 08:40 AM    CREATININE 0.91 02/23/2011 09:27 AM    BUNCREATRAT 19 02/23/2011 09:27 AM    GLOMRATE >59 02/23/2011 09:27 AM     Lab Results   Component Value Date/Time    ASTSGOT 34 12/10/2021 1631    ALTSGPT 37 12/10/2021 1631    ALKPHOSPHAT 97 12/10/2021 1631    ALBUMIN 4.2 12/10/2021 1631     Workup to date:  -LDL 83, triglycerides 144  -TSH normal  -HIV/RPR non reactive  -Copper 167.1 (mildly elevated)  -Zinc normal  -B1 218  -B12 667  -B6 197 (elevated)  -ASHELY normal  -A1C 5.3      Imaging:   MRI brain without 1/2022:  10 mm x 7 mm x 7 mm notch-like defect in the posterior-inferior midline cerebellar vermis. This was not present on MRI cervical spine from 3/7/2011. The lesion is isointense with CSF and appears contiguous with cisterna magna. Differential considerations would include development of an arachnoid cyst invaginating the cerebellar vermis versus macrocystic encephalomalacia extending to the vermian surface consistent with an old infarct or other remote cerebellar vermis insult. Uncertain whether this may be related  to the patient's unsteady gait.    MRI C spine without contrast 1/2022:  Mild degenerative disease in the cervical spine as described above. When compared with the previous MRI dated 3/7/2011, there has been mild interval progression of the degenerative disease.    MRI L spine without 1/2022:  There is central and right paracentral disc extrusion at L1-2. The extruded disc fragment migrated upwards. There is mild effacement of the ventral subarachnoid space. There is no significant canal compromise.    Records reviewed: PCP note reviewed dated March 2022.  Patient messages reviewed, patient is requesting rizatriptan.  Message regarding EMG.  Patient following Dr. Early.        ASSESSMENT AND PLAN:    1. Abnormal MRI of head: Incidental finding of a notch like defect in posterior inferior midline cerebellar vermis on MRI brain January 2022 without contrast.  This is new as of March 2011 MRI C-spine.  This was reviewed with neuroradiology and is of unclear etiology.  She does not use illicit substances.  She does have controlled hypertension, controlled lipids, no history of diabetes.  I suspect this is contributing to her clonus and hyperreflexia in the lower extremities.  I am not convinced this is related to her upper respiratory infection and other symptoms as per HPI.  -Pending MRA head to evaluate basilar and vertebral arteries    2. Degenerative disc disease of the lumbar spine: Central and right paracentral disc extrusion at L1 -2 with mild effacement of the ventral subarachnoid space without significant canal compromise.  Suspect new radiculopathy followed by pain management.  Pending PT OT.    3. Imbalance, stable: Patient reporting imbalance and gait disturbance of unclear etiology.  Her gait on neurological exam seems out of proportion to other exam findings. Gait is not spastic.  Very mild vibratory sensation loss in the hallux. There is no robust evidence of weakness.  Remote history of Parkinson's and  on first-degree relative -no rest tremor on exam.  Given lower extremity findings and work-up to date, MRI T-spine is necessary.    -Pending MRI T-spine with and without contrast  -Declined electrodiagnostic testing   -Continue to monitor  -Ending PT OT for back    4. Migraine: chronic, episodic.   -PRN rizatriptan 10mg tablet    5. Elevated B6: repeat B6 today, fasting      FOLLOW-UP: Return in about 6 months (around 2/16/2023).  Reviewed work-up, repeat neurological exam  EDUCATION AND COUNSELING:  -I personally discussed the following with the patient:   Differential diagnosis and medical reasoning as above, reasons for further work-up and imaging    The patient/family communicates understanding of the above and agrees that due to the complexity of the diagnosis/management, results of any testing and further recommendations will typically be discussed/made during a face to face encounter in my office. The patient and/or family further understands it is their responsibility to keep proper follow up for safe and effective management of their condition. Failure to do so, may result in discharge from [facility].     This dictation was created using voice recognition software. I have made every reasonable attempt to avoid dictation errors, but this document may contain an error not identified before finalizing. If the error changes the accuracy of the document, I would appreciate it being brought to my attention. Thank you.      Jeannette León MD  Neurology

## 2022-08-16 ENCOUNTER — OFFICE VISIT (OUTPATIENT)
Dept: NEUROLOGY | Facility: MEDICAL CENTER | Age: 47
End: 2022-08-16
Attending: PSYCHIATRY & NEUROLOGY
Payer: COMMERCIAL

## 2022-08-16 VITALS
HEART RATE: 63 BPM | SYSTOLIC BLOOD PRESSURE: 134 MMHG | DIASTOLIC BLOOD PRESSURE: 80 MMHG | WEIGHT: 288.14 LBS | HEIGHT: 67 IN | BODY MASS INDEX: 45.22 KG/M2 | OXYGEN SATURATION: 93 % | TEMPERATURE: 97.3 F

## 2022-08-16 DIAGNOSIS — G43.109 MIGRAINE WITH AURA AND WITHOUT STATUS MIGRAINOSUS, NOT INTRACTABLE: ICD-10-CM

## 2022-08-16 DIAGNOSIS — F41.1 GENERALIZED ANXIETY DISORDER: ICD-10-CM

## 2022-08-16 DIAGNOSIS — E67.2 HYPERVITAMINOSIS B6: ICD-10-CM

## 2022-08-16 DIAGNOSIS — R93.0 ABNORMAL MRI OF HEAD: ICD-10-CM

## 2022-08-16 PROCEDURE — 99212 OFFICE O/P EST SF 10 MIN: CPT | Performed by: PSYCHIATRY & NEUROLOGY

## 2022-08-16 PROCEDURE — 99214 OFFICE O/P EST MOD 30 MIN: CPT | Performed by: PSYCHIATRY & NEUROLOGY

## 2022-08-16 RX ORDER — UPADACITINIB 15 MG/1
TABLET, EXTENDED RELEASE ORAL
COMMUNITY
Start: 2022-08-03

## 2022-08-16 RX ORDER — ZOLPIDEM TARTRATE 5 MG/1
5 TABLET ORAL NIGHTLY PRN
Qty: 30 TABLET | Refills: 0 | Status: SHIPPED | OUTPATIENT
Start: 2022-08-16 | End: 2022-08-30 | Stop reason: SDUPTHER

## 2022-08-16 RX ORDER — RIZATRIPTAN BENZOATE 10 MG/1
10 TABLET ORAL
Qty: 9 TABLET | Refills: 3 | Status: SHIPPED | OUTPATIENT
Start: 2022-08-16 | End: 2023-02-17 | Stop reason: SDUPTHER

## 2022-08-16 ASSESSMENT — FIBROSIS 4 INDEX: FIB4 SCORE: 1.44

## 2022-08-19 RX ORDER — METHYLPREDNISOLONE ACETATE 40 MG/ML
40 INJECTION, SUSPENSION INTRA-ARTICULAR; INTRALESIONAL; INTRAMUSCULAR; SOFT TISSUE ONCE
OUTPATIENT
Start: 2022-08-19 | End: 2022-08-22

## 2022-08-29 DIAGNOSIS — G89.4 CHRONIC PAIN SYNDROME: ICD-10-CM

## 2022-08-30 ENCOUNTER — OFFICE VISIT (OUTPATIENT)
Dept: BEHAVIORAL HEALTH | Facility: CLINIC | Age: 47
End: 2022-08-30
Payer: COMMERCIAL

## 2022-08-30 DIAGNOSIS — F34.0 CYCLOTHYMIC DISORDER: ICD-10-CM

## 2022-08-30 DIAGNOSIS — Z79.899 LITHIUM USE: ICD-10-CM

## 2022-08-30 DIAGNOSIS — E66.9 OBESITY, UNSPECIFIED CLASSIFICATION, UNSPECIFIED OBESITY TYPE, UNSPECIFIED WHETHER SERIOUS COMORBIDITY PRESENT: ICD-10-CM

## 2022-08-30 DIAGNOSIS — F41.1 GENERALIZED ANXIETY DISORDER: ICD-10-CM

## 2022-08-30 DIAGNOSIS — R25.1 TREMOR: ICD-10-CM

## 2022-08-30 PROCEDURE — 99214 OFFICE O/P EST MOD 30 MIN: CPT | Performed by: PSYCHIATRY & NEUROLOGY

## 2022-08-30 RX ORDER — DULOXETIN HYDROCHLORIDE 60 MG/1
120 CAPSULE, DELAYED RELEASE ORAL DAILY
Qty: 90 CAPSULE | Refills: 0 | Status: SHIPPED | OUTPATIENT
Start: 2022-08-30 | End: 2022-10-24

## 2022-08-30 RX ORDER — BREXPIPRAZOLE 1 MG/1
TABLET ORAL
Qty: 90 TABLET | Refills: 1 | Status: SHIPPED | OUTPATIENT
Start: 2022-08-30 | End: 2022-09-01

## 2022-08-30 RX ORDER — LITHIUM CARBONATE 300 MG
300 TABLET ORAL
Qty: 90 TABLET | Refills: 0 | Status: SHIPPED | OUTPATIENT
Start: 2022-08-30 | End: 2022-11-28

## 2022-08-30 RX ORDER — PREGABALIN 200 MG/1
CAPSULE ORAL
Qty: 270 CAPSULE | Refills: 0 | Status: SHIPPED | OUTPATIENT
Start: 2022-08-30 | End: 2022-11-29 | Stop reason: SDUPTHER

## 2022-08-30 RX ORDER — ZOLPIDEM TARTRATE 5 MG/1
5 TABLET ORAL NIGHTLY PRN
Qty: 30 TABLET | Refills: 0 | Status: SHIPPED | OUTPATIENT
Start: 2022-08-30 | End: 2022-09-29

## 2022-08-30 ASSESSMENT — ANXIETY QUESTIONNAIRES
1. FEELING NERVOUS, ANXIOUS, OR ON EDGE: SEVERAL DAYS
5. BEING SO RESTLESS THAT IT IS HARD TO SIT STILL: SEVERAL DAYS
4. TROUBLE RELAXING: NEARLY EVERY DAY
6. BECOMING EASILY ANNOYED OR IRRITABLE: NEARLY EVERY DAY
2. NOT BEING ABLE TO STOP OR CONTROL WORRYING: MORE THAN HALF THE DAYS
3. WORRYING TOO MUCH ABOUT DIFFERENT THINGS: MORE THAN HALF THE DAYS
IF YOU CHECKED OFF ANY PROBLEMS ON THIS QUESTIONNAIRE, HOW DIFFICULT HAVE THESE PROBLEMS MADE IT FOR YOU TO DO YOUR WORK, TAKE CARE OF THINGS AT HOME, OR GET ALONG WITH OTHER PEOPLE: VERY DIFFICULT
GAD7 TOTAL SCORE: 14
7. FEELING AFRAID AS IF SOMETHING AWFUL MIGHT HAPPEN: MORE THAN HALF THE DAYS

## 2022-08-30 ASSESSMENT — ABNORMAL INVOLUNTARY MOVEMENT SCALE (AIMS)
AIMS_PATIENT_INCAPACITATION: MINIMAL
TOTAL_SCORE: 5
AIMS_PATIENT_AWARENESS: AWARE, MILD DISTRESS
LIPS_PARIETAL: MINIMAL
UPPER_BODY_EXTREMITIES: MILD
JAW: NONE, NORMAL
NECK_SHOULDER_HIPS: NONE, NORMAL
TONGUE: NONE, NORMAL
PATIENT_WEARS_DENTURES: NO
CURRENT_PROBLEMS_TEETH_DENTURES: NO
LOWER_BODY_EXTREMITIES: MINIMAL
FACIAL_EXPRESSION_MUSCLES: MINIMAL

## 2022-08-30 ASSESSMENT — ENCOUNTER SYMPTOMS
DISTURBING DREAMS: 1
HYPERSEXUAL: 0
HOPELESSNESS: 1
DELUSIONS: 0
HALLUCINATIONS: 0
DIFFICULTY FALLING ASLEEP: 1
PREOCCUPATION: 1
WEIGHT GAIN: 1
PARANOIA: 0
GASTROINTESTINAL NEGATIVE: 1
EYES NEGATIVE: 1
SNORING: 1
AWAKENING FROM SLEEP: 1
THOUGHT CONTENT - SHAME: 1
DEPRESSION: 1
TREMORS: 1
EUPHORIC MOOD: 0
CARDIOVASCULAR NEGATIVE: 1
INSOMNIA: 1
PREMATURE MORNING AWAKENING: 1
BACK PAIN: 1
MYALGIAS: 1
MEMORY LOSS: 1
NERVOUS/ANXIOUS: 1
DIZZINESS: 1

## 2022-08-30 ASSESSMENT — PATIENT HEALTH QUESTIONNAIRE - PHQ9
SUM OF ALL RESPONSES TO PHQ QUESTIONS 1-9: 17
5. POOR APPETITE OR OVEREATING: 2 - MORE THAN HALF THE DAYS
CLINICAL INTERPRETATION OF PHQ2 SCORE: 5

## 2022-08-30 ASSESSMENT — LIFESTYLE VARIABLES: SUBSTANCE_ABUSE: 0
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
## 2022-09-01 ENCOUNTER — OFFICE VISIT (OUTPATIENT)
Dept: MEDICAL GROUP | Facility: PHYSICIAN GROUP | Age: 47
End: 2022-09-01
Payer: COMMERCIAL

## 2022-09-01 ENCOUNTER — HOSPITAL ENCOUNTER (OUTPATIENT)
Dept: LAB | Facility: MEDICAL CENTER | Age: 47
End: 2022-09-01
Attending: DERMATOLOGY
Payer: COMMERCIAL

## 2022-09-01 ENCOUNTER — APPOINTMENT (OUTPATIENT)
Dept: LAB | Facility: MEDICAL CENTER | Age: 47
End: 2022-09-01
Attending: PSYCHIATRY & NEUROLOGY
Payer: COMMERCIAL

## 2022-09-01 ENCOUNTER — HOSPITAL ENCOUNTER (OUTPATIENT)
Dept: LAB | Facility: MEDICAL CENTER | Age: 47
End: 2022-09-01
Attending: STUDENT IN AN ORGANIZED HEALTH CARE EDUCATION/TRAINING PROGRAM
Payer: COMMERCIAL

## 2022-09-01 VITALS
HEART RATE: 87 BPM | HEIGHT: 67 IN | TEMPERATURE: 97 F | DIASTOLIC BLOOD PRESSURE: 82 MMHG | SYSTOLIC BLOOD PRESSURE: 126 MMHG | OXYGEN SATURATION: 94 % | WEIGHT: 263 LBS | BODY MASS INDEX: 41.28 KG/M2

## 2022-09-01 DIAGNOSIS — G89.4 CHRONIC PAIN SYNDROME: ICD-10-CM

## 2022-09-01 DIAGNOSIS — R25.1 TREMOR: ICD-10-CM

## 2022-09-01 DIAGNOSIS — R06.09 DYSPNEA ON EXERTION: ICD-10-CM

## 2022-09-01 DIAGNOSIS — Z79.899 LITHIUM USE: ICD-10-CM

## 2022-09-01 DIAGNOSIS — Z02.89 ENCOUNTER FOR COMPLETION OF FORM WITH PATIENT: ICD-10-CM

## 2022-09-01 DIAGNOSIS — E66.9 OBESITY, UNSPECIFIED CLASSIFICATION, UNSPECIFIED OBESITY TYPE, UNSPECIFIED WHETHER SERIOUS COMORBIDITY PRESENT: ICD-10-CM

## 2022-09-01 DIAGNOSIS — K21.9 GASTROESOPHAGEAL REFLUX DISEASE, UNSPECIFIED WHETHER ESOPHAGITIS PRESENT: ICD-10-CM

## 2022-09-01 PROBLEM — M70.61 GREATER TROCHANTERIC BURSITIS OF RIGHT HIP: Status: ACTIVE | Noted: 2022-09-01

## 2022-09-01 LAB
ALBUMIN SERPL BCP-MCNC: 4.2 G/DL (ref 3.2–4.9)
ALBUMIN/GLOB SERPL: 1.3 G/DL
ALP SERPL-CCNC: 94 U/L (ref 30–99)
ALT SERPL-CCNC: 31 U/L (ref 2–50)
ANION GAP SERPL CALC-SCNC: 12 MMOL/L (ref 7–16)
AST SERPL-CCNC: 27 U/L (ref 12–45)
BASOPHILS # BLD AUTO: 0.6 % (ref 0–1.8)
BASOPHILS # BLD: 0.05 K/UL (ref 0–0.12)
BILIRUB SERPL-MCNC: 0.6 MG/DL (ref 0.1–1.5)
BUN SERPL-MCNC: 14 MG/DL (ref 8–22)
CALCIUM SERPL-MCNC: 9.7 MG/DL (ref 8.5–10.5)
CHLORIDE SERPL-SCNC: 101 MMOL/L (ref 96–112)
CHOLEST SERPL-MCNC: 157 MG/DL (ref 100–199)
CO2 SERPL-SCNC: 28 MMOL/L (ref 20–33)
CREAT SERPL-MCNC: 0.9 MG/DL (ref 0.5–1.4)
EOSINOPHIL # BLD AUTO: 0.3 K/UL (ref 0–0.51)
EOSINOPHIL NFR BLD: 3.5 % (ref 0–6.9)
ERYTHROCYTE [DISTWIDTH] IN BLOOD BY AUTOMATED COUNT: 46.9 FL (ref 35.9–50)
EST. AVERAGE GLUCOSE BLD GHB EST-MCNC: 111 MG/DL
GFR SERPLBLD CREATININE-BSD FMLA CKD-EPI: 79 ML/MIN/1.73 M 2
GLOBULIN SER CALC-MCNC: 3.3 G/DL (ref 1.9–3.5)
GLUCOSE SERPL-MCNC: 90 MG/DL (ref 65–99)
HBA1C MFR BLD: 5.5 % (ref 4–5.6)
HCT VFR BLD AUTO: 51.1 % (ref 37–47)
HDLC SERPL-MCNC: 44 MG/DL
HGB BLD-MCNC: 16.5 G/DL (ref 12–16)
IMM GRANULOCYTES # BLD AUTO: 0.04 K/UL (ref 0–0.11)
IMM GRANULOCYTES NFR BLD AUTO: 0.5 % (ref 0–0.9)
LDLC SERPL CALC-MCNC: 83 MG/DL
LITHIUM SERPL-MCNC: 0.4 MMOL/L (ref 0.6–1.2)
LYMPHOCYTES # BLD AUTO: 1.87 K/UL (ref 1–4.8)
LYMPHOCYTES NFR BLD: 22.1 % (ref 22–41)
MCH RBC QN AUTO: 29.5 PG (ref 27–33)
MCHC RBC AUTO-ENTMCNC: 32.3 G/DL (ref 33.6–35)
MCV RBC AUTO: 91.3 FL (ref 81.4–97.8)
MONOCYTES # BLD AUTO: 0.42 K/UL (ref 0–0.85)
MONOCYTES NFR BLD AUTO: 5 % (ref 0–13.4)
NEUTROPHILS # BLD AUTO: 5.79 K/UL (ref 2–7.15)
NEUTROPHILS NFR BLD: 68.3 % (ref 44–72)
NRBC # BLD AUTO: 0 K/UL
NRBC BLD-RTO: 0 /100 WBC
PLATELET # BLD AUTO: 205 K/UL (ref 164–446)
PMV BLD AUTO: 10.9 FL (ref 9–12.9)
POTASSIUM SERPL-SCNC: 4.3 MMOL/L (ref 3.6–5.5)
PROT SERPL-MCNC: 7.5 G/DL (ref 6–8.2)
RBC # BLD AUTO: 5.6 M/UL (ref 4.2–5.4)
SODIUM SERPL-SCNC: 141 MMOL/L (ref 135–145)
TRIGL SERPL-MCNC: 152 MG/DL (ref 0–149)
WBC # BLD AUTO: 8.5 K/UL (ref 4.8–10.8)

## 2022-09-01 PROCEDURE — 86704 HEP B CORE ANTIBODY TOTAL: CPT

## 2022-09-01 PROCEDURE — 80061 LIPID PANEL: CPT

## 2022-09-01 PROCEDURE — 99213 OFFICE O/P EST LOW 20 MIN: CPT | Performed by: STUDENT IN AN ORGANIZED HEALTH CARE EDUCATION/TRAINING PROGRAM

## 2022-09-01 PROCEDURE — 86480 TB TEST CELL IMMUN MEASURE: CPT

## 2022-09-01 PROCEDURE — 83036 HEMOGLOBIN GLYCOSYLATED A1C: CPT

## 2022-09-01 PROCEDURE — 87340 HEPATITIS B SURFACE AG IA: CPT

## 2022-09-01 PROCEDURE — 86803 HEPATITIS C AB TEST: CPT

## 2022-09-01 PROCEDURE — 86706 HEP B SURFACE ANTIBODY: CPT

## 2022-09-01 PROCEDURE — 80178 ASSAY OF LITHIUM: CPT

## 2022-09-01 PROCEDURE — 85025 COMPLETE CBC W/AUTO DIFF WBC: CPT

## 2022-09-01 PROCEDURE — 80053 COMPREHEN METABOLIC PANEL: CPT

## 2022-09-01 RX ORDER — BREXPIPRAZOLE 1 MG/1
1 TABLET ORAL DAILY
Qty: 7 TABLET | Refills: 0 | Status: SHIPPED | OUTPATIENT
Start: 2022-09-01 | End: 2022-09-08

## 2022-09-01 RX ORDER — BREXPIPRAZOLE 2 MG/1
2 TABLET ORAL DAILY
Qty: 7 TABLET | Refills: 0 | Status: SHIPPED | OUTPATIENT
Start: 2022-09-08 | End: 2022-09-15

## 2022-09-01 RX ORDER — BREXPIPRAZOLE 2 MG/1
3 TABLET ORAL DAILY
Qty: 30 TABLET | Refills: 2 | Status: SHIPPED | OUTPATIENT
Start: 2022-09-15 | End: 2022-10-18

## 2022-09-01 RX ORDER — OMEPRAZOLE 40 MG/1
40 CAPSULE, DELAYED RELEASE ORAL DAILY
Qty: 90 CAPSULE | Refills: 3 | Status: SHIPPED | OUTPATIENT
Start: 2022-09-01

## 2022-09-01 ASSESSMENT — FIBROSIS 4 INDEX: FIB4 SCORE: 1.44

## 2022-09-01 NOTE — PATIENT INSTRUCTIONS
Can try flexeril instead of Skelaxin in the morning (to see if less sedating)    Let me know if they didn't get the vitamin D or thyroid labs    Imaging scheduling [phone] (CT and sniff test)    Message me prior to your follow up for labs (lipid panel due in January)    Stop celebrex if not helpful (can make heartburn worse)

## 2022-09-01 NOTE — PROGRESS NOTES
Subjective:     Chief Complaint   Patient presents with    Follow-Up     Dmv paperwork     Referral Needed     Omeprazole        HPI:   Kasie presents today for follow-up.    Since her last visit she has had visits with physiatry, psychiatry neurology and pulmonary medicine.  Record reviewed and she updated me of her current status.  Patient    No problem-specific Assessment & Plan notes found for this encounter.    Current Outpatient Medications Ordered in Epic   Medication Sig Dispense Refill    Brexpiprazole (REXULTI) 1 MG Tab Take 1 mg by mouth every day for 7 days. Then increase to 2mg prescription 7 Tablet 0    omeprazole (PRILOSEC) 40 MG delayed-release capsule Take 1 Capsule by mouth every day. 90 Capsule 3    pregabalin (LYRICA) 200 MG capsule TAKE 1 CAPSULE BY MOUTH IN THE MORNING AND 2 CAPSULES AT NIGHT 270 Capsule 0    lithium carbonate, IR, 300 MG Tab tablet Take 1 Tablet by mouth at bedtime for 90 days. 90 Tablet 0    zolpidem (AMBIEN) 5 MG Tab Take 1 Tablet by mouth at bedtime as needed for Sleep for up to 30 days. 30 Tablet 0    DULoxetine (CYMBALTA) 60 MG Cap DR Particles delayed-release capsule Take 2 Capsules by mouth every day for 90 days. 90 Capsule 0    rizatriptan (MAXALT) 10 MG tablet Take 1 Tablet by mouth one time as needed for Migraine for up to 1 dose. 9 Tablet 3    metaxalone (SKELAXIN) 800 MG Tab Take 1 Tablet by mouth every 12 hours as needed for Moderate Pain or Severe Pain. (Patient taking differently: Take 400-800 mg by mouth every 12 hours as needed for Moderate Pain or Severe Pain. 400mg in the AM and 800mg in the PM) 180 Tablet 3    vitamin D2, Ergocalciferol, (DRISDOL) 1.25 MG (46950 UT) Cap capsule TAKE 1 CAPSULE BY MOUTH ONE TIME PER WEEK 12 Capsule 0    hydroCHLOROthiazide (HYDRODIURIL) 25 MG Tab TAKE 1 TABLET BY MOUTH EVERY DAY 30 Tablet 1    WIXELA INHUB 250-50 MCG/ACT AEROSOL POWDER, BREATH ACTIVATED TAKE 1 PUFF BY MOUTH EVERY 12 HOURS 60 Each 3    atorvastatin (LIPITOR)  "20 MG Tab Take 1 Tablet by mouth every day. 90 Tablet 3    cyclobenzaprine (FLEXERIL) 10 mg Tab Take 1 Tablet by mouth every day. 90 Tablet 1    promethazine (PHENERGAN) 25 MG Tab Take 1 Tablet by mouth every 6 hours as needed for Nausea/Vomiting. 30 Tablet 0    Acetaminophen (TYLENOL PO) Take  by mouth.      levalbuterol (XOPENEX HFA) 45 MCG/ACT inhaler Inhale 1-2 Puffs every four hours as needed for Shortness of Breath (asthma symptoms). 30 g 3    celecoxib (CELEBREX) 200 MG Cap Take 200 mg by mouth every day.      propranolol CR (INDERAL LA) 80 MG CAPSULE SR 24 HR Take 80 mg by mouth every day.      [START ON 9/8/2022] Brexpiprazole (REXULTI) 2 MG Tab Take 2 mg by mouth every day for 7 days. Then increase to 3mg prescription 7 Tablet 0    [START ON 9/15/2022] Brexpiprazole (REXULTI) 2 MG Tab Take 3 mg by mouth every day for 90 days. (Patient not taking: Reported on 9/1/2022) 30 Tablet 2    RINVOQ 15 MG TABLET SR 24 HR        No current Epic-ordered facility-administered medications on file.     Health Maintenance: Reviewed with patient.    ROS:  Gen: no fevers/chills, no changes in weight  Eyes: no changes in vision  ENT: no sore throat  Pulm: no sob at rest, no cough  CV: no chest pain, no palpitations  GI: no nausea/vomiting, no diarrhea  : no dysuria  MSk: right hip/leg pain  Skin: no rash  Neuro: no headaches  Heme/Lymph: no easy bruising    Objective:     Exam:  /82 (BP Location: Left arm, Patient Position: Sitting, BP Cuff Size: Large adult)   Pulse 87   Temp 36.1 °C (97 °F) (Temporal)   Ht 1.702 m (5' 7\")   Wt 119 kg (263 lb)   LMP 07/28/2006   SpO2 94%   BMI 41.19 kg/m²  Body mass index is 41.19 kg/m².    Physical Exam:  Constitutional: Well-developed and well-nourished. No acute distress.   Skin: Skin is warm and dry. No rash noted.  Head: Atraumatic without lesions.  Eyes: Conjunctivae and extraocular motions are normal. Pupils are equal, round. No scleral icterus.   Mouth/Throat: Wearing " mask.  Neck: Supple, trachea midline.  Cardiovascular: Regular rate and rhythm, S1 and S2 without murmur, rubs, or gallops.  Lungs: Shallow inspiratory effort, CTA bilaterally, no wheezes/rhonchi/rales. No increased work of breathing or conversational dyspnea.   Abdomen: Obese. Soft, non tender, and without distention. Active bowel sounds. No rebound, guarding, masses or HSM.  Extremities: No cyanosis, clubbing, erythema, nor edema.  Neurological: Alert and oriented x 3.   Psychiatric:  Behavior, mood, and affect are appropriate.    Assessment & Plan:     47 y.o. female with the following -     1. Encounter for completion of form with patient  Disability placard form completed for patient.    2. Gastroesophageal reflux disease, unspecified whether esophagitis present  This is a chronic condition, fairly controlled.  Advised patient to stop NSAID if not effective.  Continue omeprazole 40 mg daily, declines follow-up with gastroenterology.  - omeprazole (PRILOSEC) 40 MG delayed-release capsule; Take 1 Capsule by mouth every day.  Dispense: 90 Capsule; Refill: 3    3. Chronic pain syndrome  This is a chronic condition, patient followed by physiatry.  Reporting some excessive sedation due to Skelaxin and advised her to trial Flexeril instead in the morning which may be less sedating.  Reports that Celebrex may not be helpful so given GERD as above recommend she discontinue.    4. Dyspnea on exertion  This is a chronic condition and stable, she has a CT chest and sniff test ordered by pulmonary medicine, encouraged her to schedule those.    Return in about 4 months (around 1/1/2023), or if symptoms worsen or fail to improve.    Please note that this dictation was created using voice recognition software. I have made every reasonable attempt to correct obvious errors, but I expect that there are errors of grammar and possibly content that I did not discover before finalizing the note.

## 2022-09-02 LAB
GAMMA INTERFERON BACKGROUND BLD IA-ACNC: 0.02 IU/ML
HBV CORE AB SERPL QL IA: NONREACTIVE
HBV SURFACE AB SERPL IA-ACNC: <3.5 MIU/ML (ref 0–10)
HBV SURFACE AG SER QL: NORMAL
HCV AB SER QL: NORMAL
M TB IFN-G BLD-IMP: NEGATIVE
M TB IFN-G CD4+ BCKGRND COR BLD-ACNC: 0.01 IU/ML
MITOGEN IGNF BCKGRD COR BLD-ACNC: 5.15 IU/ML
QFT TB2 - NIL TBQ2: 0.02 IU/ML

## 2022-09-06 ENCOUNTER — APPOINTMENT (OUTPATIENT)
Dept: PHYSICAL MEDICINE AND REHAB | Facility: MEDICAL CENTER | Age: 47
End: 2022-09-06
Payer: COMMERCIAL

## 2022-09-06 NOTE — PROGRESS NOTES
Follow-up patient Note    Interventional Pain and Spine  Physiatry (Physical Medicine and Rehabilitation)     Patient Name: Kasie Rodriguez  : 1975  Date of Service: 2022      Chief Complaint:   No chief complaint on file.      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 Low back pain is comparatively worse on average, but her neck pain is worse today.     She reports her pain is located at her right low back and radiates down her right leg.  She also endorses tingling in her anterior thighs bilaterally and lateral right distal calf.  She endorses right leg weakness and has fallen, most recently 3 months ago.  She states she first noticed numbness and tingling in her legs about 5 years ago but that has been progressively worsening.     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  She states this pain radiates into her bilateral hands (R>L) especially her right 2nd-4th fingers.  She also endorses pain, numbness, and tingling in her left thumb and second finger.  She states she has received 2 EMGs in the past.  EMG 2011 reviewed today indicates electrodiagnostic evidence of right sensory radial neuropathy but not median neuropathy or ulnar neuropathy on either side.  Cervical radiculopathy could not be assessed due to patient's difficulty tolerating the needle part of the study.       Of note she reports approximately 1 month of new symptoms including being off balance and nocturnal urinary incontinence.  She denies noticeable change in cognition.  Along with this she has developed breathing difficulty for which she has been referred to a pulmonologist by her PCP. She denies urinary incontinence during the day, bowel incontinence, or saddle anesthesia.      Her pain at its best-worse level during the  course of the day is 5-8/10, respectively. Pain right now is 7/10 on the numeric pain scale. Pain worsens with standing, walking, side bending or twisting, walking upstairs and walking downstairs and improves with nothing. Her pain interferes somewhat with ADLs. The patient otherwise denies new weakness, numbness, or bladder/bowel incontinence. Endorses muscle spasms.      Also notices swelling in hands and calves. Sees rheumatology Dr. Olivo.     Limited in physical activity due to being short of breath.     The patient has done physical therapy for this problem for about 1 year. Notes improved posture with PT. Used to go to chiropractor which helped. Last time she went to chiropractor, 3 years ago, she had severe pain and weakness of right side lasting 2 days so she has not returned.     Patient has tried the following medications with varied success (current meds in bold):   Flexeril - takes once per day at night  cymbalta - primarily for depression. No noticeable relief in pain.  Skelaxin - takes once per day in afternoon. helps when lying still  lyrica - significant relief. Takes 200mg TID     Therapeutic modalities and interventional therapies to date include:  -pt reports having received TPI with no relief for pain level past 6/10  - cervical epidural steroid injection with 3-4 months relief at a time (last had years ago)  -Cervical medial branch blocks x1.  Pt states her insurance changed and couldn't proceed with treatment.   - has not had injections for low back    HPI  Today's visit:  Kasie Rodriguez ( 1975) is a female with There were no encounter diagnoses.    Kasie Rodriguez presents today after R GTB injection on 22. Since last visit saw PCP who advised switching from skelaxin to flexeril due to excess sedation on skelaxin. Also saw neuro who noted possible new lumbar radic.        with worsening right lateral hip pain that started 5 days ago with a trip to [place] involving a  lot of walking. Notes that she had frequent muscle spasms and sensation of popping at her lateral right hip.     Denies changes in pain medications since last visit. Notes tizanidine didn't work but skelaxin continues to help her. Takes skelaxin 800mg BID with relief and no unwanted SE. Takes flexeril PRN at night.  Also takes Lyrica.    No longer doing PT for urinary incontinence. Did 1.5 months of it without relief    Also having continuous low back pain (R>L) .  Pain radiates slightly down her right low back but not past her mid glute.  She endorses ongoing painful spasms at her low back. Worse with lying down and better with sitting up. Denies known inciting event for worsening pain. Notes circumferential numbness/tingling distal from knees with standing.     Was originally scheduled for facet joint steroid injections but they were cost prohibitive.  Would like to consider lumbar medial branch blocks instead if these are more affordable.    Planning to f/u with neuro.    Procedure history:  - 8/4/22 R GTB injection      ROS:   Red Flags ROS:  Fever, Chills, Sweats: Denies  Involuntary Weight Loss: Denies  Bladder Incontinence: Denies  Bowel Incontinence: denies  Saddle Anesthesia: Denies    All other systems reviewed and negative.     PMHx:   Past Medical History:   Diagnosis Date    Arthralgia of multiple joints 3/8/2011    Arthritis     ASTHMA 1/20/2010    Elevated antinuclear antibody (JOSE C) level 3/8/2011    Personal history of hydronephrosis     Rhinitis, allergic 1/20/2010    Vitamin d deficiency 3/8/2011       PSHx:   Past Surgical History:   Procedure Laterality Date    ABDOMINAL HYSTERECTOMY TOTAL  2004    endometriosis and bleeding    OOPHORECTOMY Bilateral 2004    SALPINGECTOMY Bilateral 2004    ABDOMINAL EXPLORATION      endometriosis    CHOLECYSTECTOMY      TONSILLECTOMY         Family Hx:   Family History   Problem Relation Age of Onset    Arthritis Mother     Heart Disease Mother     Colon Cancer  Father         70s    Alcohol abuse Maternal Grandmother     Suicide Attempts Maternal Grandmother          by suicide    Bipolar disorder Maternal Grandmother     Alcohol abuse Maternal Grandfather     Brain Cancer Maternal Grandfather     Alcohol abuse Paternal Grandmother     Breast Cancer Paternal Grandmother          of breast CA    Alcohol abuse Paternal Grandfather     Heart Disease Paternal Grandfather     Other Daughter         parotid tumor, vertigo    Brain Cancer Son        Social Hx:  Social History     Socioeconomic History    Marital status:      Spouse name: Not on file    Number of children: 2    Years of education: Not on file    Highest education level: Not on file   Occupational History    Not on file   Tobacco Use    Smoking status: Never    Smokeless tobacco: Never   Vaping Use    Vaping Use: Never used   Substance and Sexual Activity    Alcohol use: Yes     Comment: 1 glass of wine on occasion    Drug use: Never    Sexual activity: Yes     Partners: Male     Birth control/protection: Female Sterilization   Other Topics Concern    Not on file   Social History Narrative    3 foster children currently.     Social Determinants of Health     Financial Resource Strain: Not on file   Food Insecurity: Not on file   Transportation Needs: Not on file   Physical Activity: Not on file   Stress: Not on file   Social Connections: Not on file   Intimate Partner Violence: Not on file   Housing Stability: Not on file       Allergies:  Allergies   Allergen Reactions    Biaxin [Clarithromycin] Vomiting    Dupixent [Dupilumab] Hives    Latex        Medications: reviewed on epic.   No outpatient medications have been marked as taking for the 22 encounter (Appointment) with Na Veloz M.D..        Current Outpatient Medications on File Prior to Visit   Medication Sig Dispense Refill    Brexpiprazole (REXULTI) 1 MG Tab Take 1 mg by mouth every day for 7 days. Then increase to 2mg prescription  7 Tablet 0    [START ON 9/8/2022] Brexpiprazole (REXULTI) 2 MG Tab Take 2 mg by mouth every day for 7 days. Then increase to 3mg prescription 7 Tablet 0    [START ON 9/15/2022] Brexpiprazole (REXULTI) 2 MG Tab Take 3 mg by mouth every day for 90 days. (Patient not taking: Reported on 9/1/2022) 30 Tablet 2    omeprazole (PRILOSEC) 40 MG delayed-release capsule Take 1 Capsule by mouth every day. 90 Capsule 3    pregabalin (LYRICA) 200 MG capsule TAKE 1 CAPSULE BY MOUTH IN THE MORNING AND 2 CAPSULES AT NIGHT 270 Capsule 0    lithium carbonate, IR, 300 MG Tab tablet Take 1 Tablet by mouth at bedtime for 90 days. 90 Tablet 0    zolpidem (AMBIEN) 5 MG Tab Take 1 Tablet by mouth at bedtime as needed for Sleep for up to 30 days. 30 Tablet 0    DULoxetine (CYMBALTA) 60 MG Cap DR Particles delayed-release capsule Take 2 Capsules by mouth every day for 90 days. 90 Capsule 0    RINVOQ 15 MG TABLET SR 24 HR       rizatriptan (MAXALT) 10 MG tablet Take 1 Tablet by mouth one time as needed for Migraine for up to 1 dose. 9 Tablet 3    metaxalone (SKELAXIN) 800 MG Tab Take 1 Tablet by mouth every 12 hours as needed for Moderate Pain or Severe Pain. (Patient taking differently: Take 400-800 mg by mouth every 12 hours as needed for Moderate Pain or Severe Pain. 400mg in the AM and 800mg in the PM) 180 Tablet 3    vitamin D2, Ergocalciferol, (DRISDOL) 1.25 MG (75130 UT) Cap capsule TAKE 1 CAPSULE BY MOUTH ONE TIME PER WEEK 12 Capsule 0    hydroCHLOROthiazide (HYDRODIURIL) 25 MG Tab TAKE 1 TABLET BY MOUTH EVERY DAY 30 Tablet 1    WIXELA INHUB 250-50 MCG/ACT AEROSOL POWDER, BREATH ACTIVATED TAKE 1 PUFF BY MOUTH EVERY 12 HOURS 60 Each 3    atorvastatin (LIPITOR) 20 MG Tab Take 1 Tablet by mouth every day. 90 Tablet 3    cyclobenzaprine (FLEXERIL) 10 mg Tab Take 1 Tablet by mouth every day. 90 Tablet 1    promethazine (PHENERGAN) 25 MG Tab Take 1 Tablet by mouth every 6 hours as needed for Nausea/Vomiting. 30 Tablet 0    Acetaminophen  (TYLENOL PO) Take  by mouth.      levalbuterol (XOPENEX HFA) 45 MCG/ACT inhaler Inhale 1-2 Puffs every four hours as needed for Shortness of Breath (asthma symptoms). 30 g 3    celecoxib (CELEBREX) 200 MG Cap Take 200 mg by mouth every day.      propranolol CR (INDERAL LA) 80 MG CAPSULE SR 24 HR Take 80 mg by mouth every day.       No current facility-administered medications on file prior to visit.         EXAMINATION     Physical Exam:   There were no vitals taken for this visit.    Constitutional:   Body Habitus: There is no height or weight on file to calculate BMI.  Cooperation: Fully cooperates with exam  Appearance: Well-groomed, well-nourished.    Eyes: No scleral icterus to suggest severe liver disease, no proptosis to suggest severe hyperthyroidism    ENT -no obvious auditory deficits, no noticeable facial droop     Skin -no rashes or lesions noted     Respiratory-  breathing comfortably on room air, no audible wheezing    Cardiovascular-distal extremities warm and well perfused.  No lower extremity edema is noted.     Gastrointestinal - no obvious abdominal masses, non-distended    Psychiatric- alert and oriented ×3. Normal affect.     Gait - no use of ambulatory device, nonantalgic. Gait notable for decreased hip flexion. previous exam: unable to heel walk with difficulty jamila `ng.  Able to rise onto toes, however having difficulty balancing with toe walk.     Musculoskeletal and Neuro -        Thoracic/Lumbar Spine/Sacral Spine/Hips   Inspection: No evidence of atrophy in bilateral lower extremities throughout     There is limited active range of motion with lumbar extension     Facet loading maneuver pos on right, neg on left     Sensation intact to light touch at bilateral lower extremities while supine.     Tenderness to palpation over the right greater trochanter and bilateral glutes.  No tenderness to palpation over bilateral lumbar facets.     Lumbar spine /hip provocative exam  maneuvers  Straight leg raise negative bilaterally  FADIR test negative bilaterally but on right worsens right greater trochanteric pain     SI joint tests  VIOLETTA test negative bilaterally but on right worsens right greater trochanteric pain       Motor Exam Lower Extremities  ? Myotome R L   Hip flexion L2 4* 5   Knee extension L3 5 5   Ankle dorsiflexion L4 5 5   Toe extension L5 5 5   Ankle plantarflexion S1 5 5    *Pain limited     Previous exam  Cervical spine / upper extremities  Inspection: No deformities of the skin over the cervical spine. No rashes or lesions.     limited active range of motion in all directions.  Forward hunched posture with palpably increased muscular tension throughout posterior upper back.     Spurling's sign  negative bilaterally  Cervical facet loading maneuver  negative bilaterally     No signs of muscular atrophy in bilateral upper extremities      Tenderness to palpation at right paracervical muscle and bilateral upper trapezius.        Motor Exam Upper Extremities   ? Myotome R L   Shoulder abduction C5 5 5   Elbow flexion C5 5 5   Wrist extension C6 4+* 4+*   Elbow extension C7 5 5   Finger flexion C8 4- 4-   Finger abduction T1 4-^ 4-^   * giveway weakness  ^ Decreased ROM     Previous exam    Impaired sensation to light touch at anterior right upper trapezius, right dorsum of thumb, right lateral shoulder, right anterior wrist     Tinel's at the wrist over the median nerve negative bilaterally  Tinel's at the cubital tunnel: negative bilaterally    Reflexes  ?   R L   Biceps   2+ 2+   Brachioradialis   2+ 2+      Smith's sign positive right, negative left      ROM: limited active range of motion with lumbar flexion, lateral flexion, and rotation bilaterally.       Reflexes  ?   R L   Patella   2+ 2+   Achilles    2+ 2+      Clonus of the ankle positive bilaterally             MEDICAL DECISION MAKING    Medical records review: see under HPI section.     DATA    Labs:  Personally reviewed at today's visit:     Lab Results   Component Value Date/Time    SODIUM 141 09/01/2022 10:51 AM    POTASSIUM 4.3 09/01/2022 10:51 AM    CHLORIDE 101 09/01/2022 10:51 AM    CO2 28 09/01/2022 10:51 AM    ANION 12.0 09/01/2022 10:51 AM    GLUCOSE 90 09/01/2022 10:51 AM    BUN 14 09/01/2022 10:51 AM    CREATININE 0.90 09/01/2022 10:51 AM    CREATININE 0.91 02/23/2011 09:27 AM    CALCIUM 9.7 09/01/2022 10:51 AM    ASTSGOT 27 09/01/2022 10:51 AM    ALTSGPT 31 09/01/2022 10:51 AM    TBILIRUBIN 0.6 09/01/2022 10:51 AM    ALBUMIN 4.2 09/01/2022 10:51 AM    ALBUMIN 3.74 (L) 05/18/2022 08:40 AM    TOTPROTEIN 7.5 09/01/2022 10:51 AM    TOTPROTEIN 7.0 05/18/2022 08:40 AM    GLOBULIN 3.3 09/01/2022 10:51 AM    AGRATIO 1.3 09/01/2022 10:51 AM       No results found for: PROTHROMBTM, INR     Lab Results   Component Value Date/Time    WBC 8.5 09/01/2022 10:51 AM    RBC 5.60 (H) 09/01/2022 10:51 AM    HEMOGLOBIN 16.5 (H) 09/01/2022 10:51 AM    HEMATOCRIT 51.1 (H) 09/01/2022 10:51 AM    MCV 91.3 09/01/2022 10:51 AM    MCH 29.5 09/01/2022 10:51 AM    MCHC 32.3 (L) 09/01/2022 10:51 AM    MPV 10.9 09/01/2022 10:51 AM    NEUTSPOLYS 68.30 09/01/2022 10:51 AM    LYMPHOCYTES 22.10 09/01/2022 10:51 AM    MONOCYTES 5.00 09/01/2022 10:51 AM    EOSINOPHILS 3.50 09/01/2022 10:51 AM    BASOPHILS 0.60 09/01/2022 10:51 AM        Lab Results   Component Value Date/Time    HBA1C 5.5 09/01/2022 10:48 AM        EMG/NCS 3/16/11 by Dr. Martinez       Imaging:   I personally reviewed following images, these are my reads  X-ray lumbar spine 12/10/2010  Normal alignment.  No evidence of significant facet arthropathy or disc space narrowing.  Mild sclerosis of bilateral sacroiliac joints     MRI cervical spine 3/7/11  Disc bulge at C3-4, C4-5, C5-6, C6-7 with Modic changes at C6-7.  Mild-moderate neuroforaminal stenosis at right C5-C6.  Very mild spinal canal stenosis at C4-5 and C6-7. Decreased cervical lordosis    MRI cervical spine  1/13/22  Severe right and mild left neuroforaminal stenosis at C4-C5, moderate to severe bilateral neuroforaminal stenosis at C5-C6. Facet arthropathy at C3-C4, C4-C5, and C5-C6. Mild central canal stenosis at C5-C6.      MRI lumbar spine 1/13/22  No significant neuroforaminal stenosis at any level. Facet arthropathy at bilateral L4-L5 and L5-S1. Central and right paracentral disc extrusion at L1-L2 that is not impinging on the adjacent nerve root or central canal.    IMAGING radiology reads. I reviewed the following radiology reads       Results for orders placed during the hospital encounter of 01/26/22    MR-BRAIN-W/O    Impression  1.  10 mm x 7 mm x 7 mm notch-like defect in the posterior-inferior midline cerebellar vermis. This was not present on MRI cervical spine from 3/7/2011. The lesion is isointense with CSF and appears contiguous with cisterna magna. Differential  considerations would include development of an arachnoid cyst invaginating the cerebellar vermis versus macrocystic encephalomalacia extending to the vermian surface consistent with an old infarct or other remote cerebellar vermis insult. Uncertain  whether this may be related to the patient's unsteady gait.             Results for orders placed in visit on 01/13/22    MR-CERVICAL SPINE-W/O    Impression  Mild degenerative disease in the cervical spine as described above. When compared with the previous MRI dated 3/7/2011, there has been mild interval progression of the degenerative disease.      Results for orders placed in visit on 01/13/22    MR-LUMBAR SPINE-W/O    Impression  There is central and right paracentral disc extrusion at L1-2. The extruded disc fragment migrated upwards. There is mild effacement of the ventral subarachnoid space. There is no significant canal compromise.     MR-CERVICAL SPINE-W/O 03/07/11    FINDINGS:   The cervical spine maintains normal height and alignment.     At the level of C2-3, there is no spinal or  neural foraminal stenosis.     At the level of C3-4, there is mild disk bulge causing mild effacement of   the anterior thecal sac.     At the level of C4-5, there is mild disk bulge causing mild spinal canal   stenosis.  There is flattening of the anterior spinal cord contour.     At the level of C5-6, there is mild disk bulge causing effacement of the   anterior thecal sac.  There is mild flattening of the anterior spinal   cord contour.     At the level of C6-7, there is disk degeneration.  There is diffuse disk   bulge causing mild spinal canal stenosis.  There is flattening of the   anterior spinal cord contour.     At the level of C7-T1, there is no spinal or neural foraminal stenosis.     The visualized posterior fossa structures appear normal within limits.     The cervical spinal cord does not demonstrate any mass or abnormal T2   signal intensity.     The visualized pre-and paraspinal soft tissues appear normal within   limits.         IMPRESSION:   Mild degenerative changes in the cervical spine as described above.       XR lumbar spine 12/10/2010  FINDINGS:   The bony trabecular pattern is normal.  The lumbar vertebral bodies have   a normal height and alignment.  The disc spaces are well maintained and   symmetrical.  There is no evidence of spondylolisthesis, spondylolysis,   or osseous lesion.  The posterior elements are unremarkable.   Mildly sclerotic SI joints.         IMPRESSION:   Normal complete lumbar spine series.  Mildly sclerotic SI joints.      Diagnosis  {No diagnosis found. (Refresh or delete this SmartLink)}        ASSESSMENT AND PLAN:  Kasie Rodriguez  1975 is a female with history of rheumatoid arthritis followed by rheumatology Dr. Olivo, vitamin D deficiency, insomnia, anxiety, depression, lower extremity edema, IBS, BMI 40 who presents with ongoing right-sided low back pain and new pain at her right greater trochanter.    She has a positive right facet loading  maneuver and imaging findings of facet arthropathy at L4-L5 and L5-S1, suggestive of lumbar facetogenic pain. Pain does not radiate past her mid glute, suggesting that nerve impingement is less likely to be a cause of her symptoms.  Additionally no significant nerve root impingement was seen on recent MRI lumbar spine 1/13/22.    She has being followed by neurology for symptoms including ongoing gait instability with MRI brain revealing cerebellar lesion.     There are no diagnoses linked to this encounter.        The above note documents my personal evaluation of this patient. In addition, I have reviewed and confirmed with the patient and MA the supportive information documented in today's Patient Health Questionnaire and Office Note.       PLAN  Physical Therapy:   - discussed possible PT referral. Pt declined at this time in favor of medications and injections first.     Diagnostic workup: no new imaging needed at this time.     Medications:  - cont lyrica, Flexeril, Cymbalta, Skelaxin, celebrex  - s/p tizanidine with no significant improvement  - s/p low dose naltrexone 4.5mg daily given no relief  - consider taking tylenol as needed up to 4 grams per day, in divided doses at least 6 hours apart. Do not take more than 1 gram at a time.     Interventions:   - right greater trochanteric bursa steroid injection under ultrasound guidance. The risks, benefits, and alternatives to this procedure were discussed and the patient wishes to proceed with the procedure. Risks include but are not limited to damage to surrounding structures, infection, bleeding, worsening of pain which can be permanent, and weakness which can be permanent. Benefits include pain relief and improved function. Alternatives include not doing the procedure.    -previously planned for intra-articular facet joint steroid injection at right L4-5 and L5-S1 with fluoroscopic guidance which were cost prohibitive  - plan for diagnostic lumbar medial  branch blocks targeting Right L4-L5 and L5-S1 facet joints if these are more affordable - order placed today so patient may assess cost. The risks, benefits, and alternatives to this procedure were discussed and the patient wishes to proceed with the procedure. Risks include but are not limited to damage to surrounding structures, infection, bleeding, worsening of pain which can be permanent, and weakness which can be permanent. Benefits include pain relief and improved function. Alternatives include not doing the procedure.    - consider TPI PRN    Referrals: Patient previously declined referral for repeat EMG/NCS.  She reports she has had this twice in the past and was difficult to tolerate.     Other:  - Continue follow-up with rheumatology Dr. Olivo     Outside records requested:  The patient previously signed outside records request form for her outside records including outside images. This includes the records from [place]    Follow-up: next available for procedure above    No orders of the defined types were placed in this encounter.      Na Veloz MD  Interventional Pain Management  Physical Medicine and Rehabilitation  [facility]    Please note that this dictation was created using voice recognition software. I have made every reasonable attempt to correct obvious errors, but I expect that there are errors of grammar and possibly content that I did not discover before finalizing the note.

## 2022-09-23 ENCOUNTER — APPOINTMENT (OUTPATIENT)
Dept: RADIOLOGY | Facility: MEDICAL CENTER | Age: 47
End: 2022-09-23
Attending: PSYCHIATRY & NEUROLOGY
Payer: COMMERCIAL

## 2022-09-26 ENCOUNTER — APPOINTMENT (OUTPATIENT)
Dept: SLEEP MEDICINE | Facility: MEDICAL CENTER | Age: 47
End: 2022-09-26
Payer: COMMERCIAL

## 2022-10-02 DIAGNOSIS — R11.0 NAUSEA: ICD-10-CM

## 2022-10-04 RX ORDER — PROMETHAZINE HYDROCHLORIDE 25 MG/1
TABLET ORAL
Qty: 30 TABLET | Refills: 0 | Status: SHIPPED | OUTPATIENT
Start: 2022-10-04

## 2022-10-07 ENCOUNTER — APPOINTMENT (OUTPATIENT)
Dept: BEHAVIORAL HEALTH | Facility: CLINIC | Age: 47
End: 2022-10-07
Payer: COMMERCIAL

## 2022-10-12 ENCOUNTER — OFFICE VISIT (OUTPATIENT)
Dept: BEHAVIORAL HEALTH | Facility: CLINIC | Age: 47
End: 2022-10-12
Payer: COMMERCIAL

## 2022-10-12 DIAGNOSIS — F33.2 SEVERE EPISODE OF RECURRENT MAJOR DEPRESSIVE DISORDER, WITHOUT PSYCHOTIC FEATURES (HCC): ICD-10-CM

## 2022-10-12 DIAGNOSIS — F41.1 GENERALIZED ANXIETY DISORDER: ICD-10-CM

## 2022-10-12 PROCEDURE — 90791 PSYCH DIAGNOSTIC EVALUATION: CPT | Performed by: PSYCHIATRY & NEUROLOGY
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
## 2022-10-14 DIAGNOSIS — F51.04 PSYCHOPHYSIOLOGICAL INSOMNIA: ICD-10-CM

## 2022-10-14 RX ORDER — ZOLPIDEM TARTRATE 5 MG/1
TABLET ORAL
Qty: 35 TABLET | Refills: 0 | Status: SHIPPED | OUTPATIENT
Start: 2022-10-14 | End: 2022-11-30

## 2022-10-15 NOTE — TELEPHONE ENCOUNTER
Patient requesting increase in zolpidem due to acute grief with father entering hospice.  We will provide 1 week supply of increased dose in addition to regular 5 mg dosage, however discouraging use of titrating back to 10 mg indefinitely

## 2022-10-18 RX ORDER — BREXPIPRAZOLE 2 MG/1
TABLET ORAL
Qty: 30 TABLET | Refills: 2 | Status: SHIPPED | OUTPATIENT
Start: 2022-10-18 | End: 2022-10-18

## 2022-10-21 ENCOUNTER — TELEMEDICINE (OUTPATIENT)
Dept: BEHAVIORAL HEALTH | Facility: CLINIC | Age: 47
End: 2022-10-21
Payer: COMMERCIAL

## 2022-10-21 DIAGNOSIS — F41.1 GENERALIZED ANXIETY DISORDER: ICD-10-CM

## 2022-10-21 DIAGNOSIS — F33.2 SEVERE EPISODE OF RECURRENT MAJOR DEPRESSIVE DISORDER, WITHOUT PSYCHOTIC FEATURES (HCC): ICD-10-CM

## 2022-10-21 PROCEDURE — 90834 PSYTX W PT 45 MINUTES: CPT | Mod: 95 | Performed by: PSYCHIATRY & NEUROLOGY
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
## 2022-10-24 RX ORDER — DULOXETIN HYDROCHLORIDE 60 MG/1
120 CAPSULE, DELAYED RELEASE ORAL DAILY
Qty: 90 CAPSULE | Refills: 0 | Status: SHIPPED | OUTPATIENT
Start: 2022-10-24 | End: 2022-11-08

## 2022-11-04 ENCOUNTER — APPOINTMENT (OUTPATIENT)
Dept: BEHAVIORAL HEALTH | Facility: CLINIC | Age: 47
End: 2022-11-04
Payer: COMMERCIAL

## 2022-11-08 RX ORDER — DULOXETIN HYDROCHLORIDE 60 MG/1
120 CAPSULE, DELAYED RELEASE ORAL DAILY
Qty: 180 CAPSULE | Refills: 1 | Status: SHIPPED | OUTPATIENT
Start: 2022-11-08 | End: 2023-01-03 | Stop reason: SDUPTHER

## 2022-11-09 DIAGNOSIS — M54.12 RADICULOPATHY, CERVICAL: ICD-10-CM

## 2022-11-09 RX ORDER — CYCLOBENZAPRINE HCL 10 MG
10 TABLET ORAL DAILY
Qty: 90 TABLET | Refills: 1 | Status: SHIPPED | OUTPATIENT
Start: 2022-11-09

## 2022-11-11 ENCOUNTER — TELEMEDICINE (OUTPATIENT)
Dept: BEHAVIORAL HEALTH | Facility: CLINIC | Age: 47
End: 2022-11-11
Payer: COMMERCIAL

## 2022-11-11 DIAGNOSIS — F41.1 GENERALIZED ANXIETY DISORDER: ICD-10-CM

## 2022-11-11 DIAGNOSIS — F33.2 SEVERE EPISODE OF RECURRENT MAJOR DEPRESSIVE DISORDER, WITHOUT PSYCHOTIC FEATURES (HCC): ICD-10-CM

## 2022-11-11 PROCEDURE — 90834 PSYTX W PT 45 MINUTES: CPT | Mod: 95 | Performed by: PSYCHIATRY & NEUROLOGY

## 2022-11-11 NOTE — PROGRESS NOTES
"[facility]   Therapy Progress Note      This visit was conducted via Zoom using secure and encrypted videoconferencing technology.  The patient was in a private location in the [facility].  The patient's identity was confirmed and verbal consent was obtained for this virtual visit.  Place of Service: POS 10 -The patient is at Home during their visit      Name: Kasie Rodriguez  MRN: [id number]  : 1975  Age: 47 y.o.  Date of assessment: 2022  PCP: Leslie Jean-Baptiste D.O.  Persons in attendance: Patient  Total session time: 50 minutes    Therapy was provided on this date in coordination with the [facility] approved Clinical Supervisor under the direct supervision of Dr. Jaosn Brown who was on site during this visit.     Objective Observations:   Participation:Active verbal participation, Verbally monopolizing, Attentive, Engaged, and Open to feedback   Grooming:Good and Casual   Cognition:Alert and Fully Oriented   Eye Contact:Good   Mood:Depressed and Anxious   Affect:Flexible, Congruent with content, Sad, and Anxious   Thought Process:Logical and Goal-directed   Speech:Rate within normal limits and Volume within normal limits    Current Risk:   Suicide: low   Homicide: low   Self-Harm: low   Relapse: low   Safety Plan Reviewed: not applicable    Topics addressed in psychotherapy include:     Kasie stated that she is struggling, as her aunt  this week while she was on vacation. She stated that the grief issues are clouding everything right now. She stated that there are a lot of family issues right now. Her son is leaving to stay with Aunt to help her on her farm, and she stated that part of the grief is letting him go. \"I love to feel needed, and I feel like I am not needed anymore.\" The therapist validate these feelings, and helped Kasie to reframe that thought by stating that he now needs her in a different way.     She stated that she has some stuff that she wants to say to " her father. She stated that he is getting upset at healthcare workers, and that this is difficult to reconcile with. She wants to talk to him about having a , and how it would be very meaningful for her. She also wants him to be comfortable. She is also struggling dealing with his step-mom because she does not communicate with her well.     Kasie is trying to regulate her system, and manage her medical issues as well. The therapist pointed out that she is managing her own health concerns while trying to motivate her father and her son to manage their own health concerns, which is lot of weight on her shoulders.     Kasie stated that she downloaded an amanda for a thought journal, and has been using this periodically. She is also working on getting more healthy.    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      Kasie stated that she would like to start using scheduling/planning sheets everyday, and the therapist suggested journaling everyday, specifically around the prompts - what I want to say to my son, and what I want to say to my father.     Care Plan Updated: No    Does patient express agreement with the above plan? Yes     Diagnosis:  1. Generalized anxiety disorder    2. Severe episode of recurrent major depressive disorder, without psychotic features (HCC)        Referral appointment(s) scheduled? Yes       Gilberto Alvarado PsyD  Clinical Psychologist Assistant   [place] #

## 2022-11-16 DIAGNOSIS — R60.0 LOWER EXTREMITY EDEMA: ICD-10-CM

## 2022-11-16 DIAGNOSIS — G89.4 CHRONIC PAIN SYNDROME: ICD-10-CM

## 2022-11-17 NOTE — TELEPHONE ENCOUNTER
Received request via: Pharmacy    Was the patient seen in the last year in this department? Yes    Does the patient have an active prescription (recently filled or refills available) for medication(s) requested? No    Does the patient have [facility] Plus and need 100 day supply (blood pressure, diabetes and cholesterol meds only)? Patient does not have SCP

## 2022-11-20 RX ORDER — PREGABALIN 200 MG/1
CAPSULE ORAL
Qty: 270 CAPSULE | Refills: 0 | OUTPATIENT
Start: 2022-11-20 | End: 2023-02-14

## 2022-11-20 RX ORDER — HYDROCHLOROTHIAZIDE 25 MG/1
25 TABLET ORAL DAILY
Qty: 90 TABLET | Refills: 3 | Status: SHIPPED | OUTPATIENT
Start: 2022-11-20 | End: 2022-11-29 | Stop reason: SDUPTHER

## 2022-11-21 NOTE — TELEPHONE ENCOUNTER
Phone Number Called: [phone] (home) [phone] (work)      Call outcome: Did not leave a detailed message. Requested patient to call back.    Message: Unable to leave VM, VM not set up.

## 2022-11-29 ENCOUNTER — OFFICE VISIT (OUTPATIENT)
Dept: MEDICAL GROUP | Facility: PHYSICIAN GROUP | Age: 47
End: 2022-11-29
Payer: COMMERCIAL

## 2022-11-29 VITALS
HEART RATE: 98 BPM | TEMPERATURE: 97.2 F | HEIGHT: 68 IN | BODY MASS INDEX: 41.22 KG/M2 | RESPIRATION RATE: 20 BRPM | SYSTOLIC BLOOD PRESSURE: 138 MMHG | OXYGEN SATURATION: 97 % | DIASTOLIC BLOOD PRESSURE: 76 MMHG | WEIGHT: 272 LBS

## 2022-11-29 DIAGNOSIS — R60.0 LOWER EXTREMITY EDEMA: ICD-10-CM

## 2022-11-29 DIAGNOSIS — J98.6 ELEVATED HEMIDIAPHRAGM: ICD-10-CM

## 2022-11-29 DIAGNOSIS — E89.41 HOT FLASHES DUE TO SURGICAL MENOPAUSE: ICD-10-CM

## 2022-11-29 DIAGNOSIS — J98.4 RESTRICTIVE LUNG DISEASE: ICD-10-CM

## 2022-11-29 DIAGNOSIS — G43.109 MIGRAINE WITH AURA AND WITHOUT STATUS MIGRAINOSUS, NOT INTRACTABLE: ICD-10-CM

## 2022-11-29 DIAGNOSIS — J45.40 MODERATE PERSISTENT ASTHMA WITHOUT COMPLICATION: ICD-10-CM

## 2022-11-29 DIAGNOSIS — G89.4 CHRONIC PAIN SYNDROME: ICD-10-CM

## 2022-11-29 PROCEDURE — 99214 OFFICE O/P EST MOD 30 MIN: CPT | Performed by: STUDENT IN AN ORGANIZED HEALTH CARE EDUCATION/TRAINING PROGRAM

## 2022-11-29 RX ORDER — ESTRADIOL 0.03 MG/D
1 FILM, EXTENDED RELEASE TRANSDERMAL
Qty: 24 PATCH | Refills: 3 | Status: SHIPPED | OUTPATIENT
Start: 2022-11-29 | End: 2022-12-15

## 2022-11-29 RX ORDER — PROPRANOLOL HYDROCHLORIDE 80 MG/1
80 CAPSULE, EXTENDED RELEASE ORAL DAILY
Qty: 90 CAPSULE | Refills: 3 | Status: SHIPPED | OUTPATIENT
Start: 2022-11-29

## 2022-11-29 RX ORDER — ESTRADIOL 0.1 MG/D
FILM, EXTENDED RELEASE TRANSDERMAL
Qty: 24 PATCH | Refills: 6 | Status: CANCELLED | OUTPATIENT
Start: 2022-11-29

## 2022-11-29 RX ORDER — HYDROCHLOROTHIAZIDE 25 MG/1
25 TABLET ORAL DAILY
Qty: 90 TABLET | Refills: 3 | Status: SHIPPED | OUTPATIENT
Start: 2022-11-29

## 2022-11-29 RX ORDER — PREGABALIN 200 MG/1
CAPSULE ORAL
Qty: 270 CAPSULE | Refills: 0 | Status: SHIPPED | OUTPATIENT
Start: 2022-11-29 | End: 2023-02-27

## 2022-11-29 ASSESSMENT — FIBROSIS 4 INDEX: FIB4 SCORE: 1.11

## 2022-11-29 NOTE — PROGRESS NOTES
Subjective:     Chief Complaint   Patient presents with    Medication Refill     Pt states that she has a question regarding one of her meds.      HPI:   Kasie presents today for refills and discuss medications.    Patient states she ran out of her propanolol which she takes for chronic migraine prophylaxis.  Also added hydrochlorothiazide which is in the past and prescribed for lower extremity edema that she has reported some elevated blood pressures at home off medication as well.  No formal diagnosis of hypertension.    Patient states that she has not been able to get the studies recommended by pulmonary medicine done due to financial restrictions.  Should be able to do it in the early part of the year.  Admits that she has not been using her maintenance inhaler. Also frustrated with inability to lose weight.  Feels weight loss would help with her chronic pain as well as dyspnea exertion.     Having issues with Ambien refill and wonders if she should have psychiatry switch her rexulti 3mg daily since she hasn't been able to get it filled.    Wonders if she can restart estrogen for hot flashes. Does feel that Lyrica is helpful in addition to Cymbalta.    Current Outpatient Medications Ordered in Epic   Medication Sig Dispense Refill    propranolol CR (INDERAL LA) 80 MG CAPSULE SR 24 HR Take 1 Capsule by mouth every day. 90 Capsule 3    hydroCHLOROthiazide (HYDRODIURIL) 25 MG Tab Take 1 Tablet by mouth every day. 90 Tablet 3    pregabalin (LYRICA) 200 MG capsule Take 1 capsule by mouth in the morning and 2 capsules at night 270 Capsule 0    Estradiol 0.025 MG/24HR PATCH BIWEEKLY Place 1 Patch on the skin two times a week. 24 Patch 3    cyclobenzaprine (FLEXERIL) 10 mg Tab TAKE 1 TABLET BY MOUTH EVERY DAY 90 Tablet 1    DULoxetine (CYMBALTA) 60 MG Cap DR Particles delayed-release capsule TAKE 2 CAPSULES BY MOUTH EVERY DAY FOR 90 DAYS. 180 Capsule 1    promethazine (PHENERGAN) 25 MG Tab TAKE 1 TABLET BY MOUTH EVERY  "6 HOURS AS NEEDED FOR NAUSEA AND VOMITING 30 Tablet 0    omeprazole (PRILOSEC) 40 MG delayed-release capsule Take 1 Capsule by mouth every day. 90 Capsule 3    RINVOQ 15 MG TABLET SR 24 HR       rizatriptan (MAXALT) 10 MG tablet Take 1 Tablet by mouth one time as needed for Migraine for up to 1 dose. 9 Tablet 3    metaxalone (SKELAXIN) 800 MG Tab Take 1 Tablet by mouth every 12 hours as needed for Moderate Pain or Severe Pain. (Patient taking differently: Take 400-800 mg by mouth every 12 hours as needed for Moderate Pain or Severe Pain. 400mg in the AM and 800mg in the PM) 180 Tablet 3    WIXELA INHUB 250-50 MCG/ACT AEROSOL POWDER, BREATH ACTIVATED TAKE 1 PUFF BY MOUTH EVERY 12 HOURS 60 Each 3    atorvastatin (LIPITOR) 20 MG Tab Take 1 Tablet by mouth every day. 90 Tablet 3    Acetaminophen (TYLENOL PO) Take  by mouth.      levalbuterol (XOPENEX HFA) 45 MCG/ACT inhaler Inhale 1-2 Puffs every four hours as needed for Shortness of Breath (asthma symptoms). 30 g 3    zolpidem (AMBIEN) 5 MG Tab Take 1 Tablet by mouth at bedtime as needed for Sleep for up to 30 days. 30 Tablet 0    Brexpiprazole (REXULTI) 2 MG Tab Take 3 mg by mouth every day for 90 days. (Patient not taking: Reported on 11/29/2022) 45 Tablet 2     No current Epic-ordered facility-administered medications on file.     ROS:  Gen: no fevers/chills, no changes in weight  Eyes: no changes in vision  ENT: no sore throat  Pulm: no sob at rest, no cough  CV: no chest pain, no palpitations  GI: no nausea/vomiting, no diarrhea  Skin: no rash  Neuro: no severe headaches  Heme/Lymph: no easy bruising    Objective:     Exam:  /76 (BP Location: Left arm, Patient Position: Sitting, BP Cuff Size: Adult long)   Pulse 98   Temp 36.2 °C (97.2 °F) (Temporal)   Resp 20   Ht 1.727 m (5' 8\")   Wt 123 kg (272 lb)   LMP 07/28/2006   SpO2 97%   BMI 41.36 kg/m²  Body mass index is 41.36 kg/m².    Physical Exam:  Constitutional: Well-developed and well-nourished. No " acute distress.   Skin: Skin is warm and dry. No rash noted.  Head: Atraumatic without lesions.  Eyes: Conjunctivae and extraocular motions are normal. Pupils are equal, round. No scleral icterus.   Mouth/Throat: Wearing mask.  Neck: Supple, trachea midline. Normal range of motion.   Cardiovascular: Regular rate and rhythm, S1 and S2 without murmur, rubs, or gallops.  Lungs: Poor inspiratory effort with very faint occasional expiratory wheezes.  CTA bilaterally, no rhonchi/rales  Extremities: No cyanosis, clubbing, erythema, nor edema.  Neurological: Alert and oriented x 3. No gross deficits. No tremor.  Psychiatric:  Behavior, mood, and affect are appropriate.    Labs: Reviewed from 9/1/2022  Imaging: Reviewed from chest x-ray 12/10/2021, MRI cervical spine/lumbar spine 1/13/2022    Assessment & Plan:     47 y.o. female with the following -     1. Moderate persistent asthma without complication  2. Restrictive lung disease  3. Elevated hemidiaphragm  Patient continues with occasional dyspnea on exertion, not taking maintenance inhaler reliably only a few times a week.  Recommend that she would resume.  Weight loss may also be helpful as well, referred as requested to [facility].  - Referral to Pulmonary Rehab    4. Migraine with aura and without status migrainosus, not intractable  This is a chronic condition, well controlled previously with propanolol as below for prophylaxis.  Continue.  Continue care with neurology as well.  - propranolol CR (INDERAL LA) 80 MG CAPSULE SR 24 HR; Take 1 Capsule by mouth every day.  Dispense: 90 Capsule; Refill: 3    5. Lower extremity edema  Chronic condition, has been out of hydrochlorothiazide and without edema today but she prefers to resume as she may have underlying hypertension which is well controlled on this as well as propranolol for another indication as above.  Follow-up for changes.  - hydroCHLOROthiazide (HYDRODIURIL) 25 MG Tab; Take 1 Tablet by mouth every day.   Dispense: 90 Tablet; Refill: 3    6. Chronic pain syndrome  This is a chronic condition, well controlled with her current regimen so we will continue Lyrica as below.  PDMP reviewed without discrepancies.  Has also had benefit from Cymbalta and muscle relaxers.  Follow-up for worsening/changes.  - pregabalin (LYRICA) 200 MG capsule; Take 1 capsule by mouth in the morning and 2 capsules at night  Dispense: 270 Capsule; Refill: 0  - Controlled Substance Treatment Agreement    7. 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  Considered alternative to estrogen such as gabapentin but since she is already on Lyrica we will resume estrogen as below but at a lower dose from prior.  Can titrate up if not effective.  We will continue to monitor blood pressure.  Mammogram up-to-date.  Also provided her with a list of alternative medications that also help with mood that she can discuss with her psychiatrist as well which may help with hot flashes.  - Estradiol 0.025 MG/24HR PATCH BIWEEKLY; Place 1 Patch on the skin two times a week.  Dispense: 24 Patch; Refill: 3    Return in about 5 weeks (around 1/5/2023), or if symptoms worsen or fail to improve.    Please note that this dictation was created using voice recognition software. I have made every reasonable attempt to correct obvious errors, but I expect that there are errors of grammar and possibly content that I did not discover before finalizing the note.

## 2022-11-30 DIAGNOSIS — F51.04 PSYCHOPHYSIOLOGICAL INSOMNIA: ICD-10-CM

## 2022-12-01 ENCOUNTER — TELEMEDICINE (OUTPATIENT)
Dept: BEHAVIORAL HEALTH | Facility: CLINIC | Age: 47
End: 2022-12-01
Payer: COMMERCIAL

## 2022-12-01 DIAGNOSIS — F41.1 GENERALIZED ANXIETY DISORDER: ICD-10-CM

## 2022-12-01 DIAGNOSIS — F33.2 SEVERE EPISODE OF RECURRENT MAJOR DEPRESSIVE DISORDER, WITHOUT PSYCHOTIC FEATURES (HCC): ICD-10-CM

## 2022-12-01 PROCEDURE — 90834 PSYTX W PT 45 MINUTES: CPT | Mod: 95 | Performed by: PSYCHIATRY & NEUROLOGY

## 2022-12-01 RX ORDER — ZOLPIDEM TARTRATE 5 MG/1
5 TABLET ORAL NIGHTLY PRN
Qty: 30 TABLET | Refills: 0 | OUTPATIENT
Start: 2022-12-01 | End: 2022-12-31

## 2022-12-01 NOTE — TELEPHONE ENCOUNTER
Received request via: Patient    Was the patient seen in the last year in this department? Yes    Does the patient have an active prescription (recently filled or refills available) for medication(s) requested? No    Does the patient have [facility] Plus and need 100 day supply (blood pressure, diabetes and cholesterol meds only)? Medication is not for cholesterol, blood pressure or diabetes

## 2022-12-01 NOTE — PROGRESS NOTES
[facility]   Therapy Progress Note      This visit was conducted via Zoom using secure and encrypted videoconferencing technology.  The patient was in a private location in the state of [place].  The patient's identity was confirmed and verbal consent was obtained for this virtual visit.  Place of Service: POS 10 -The patient is at Home during their visit        Name: Kasie Rodriguez  MRN: [id number]  : 1975  Age: 47 y.o.  Date of assessment: 2022  PCP: Leslie Jean-Baptiste D.O.  Persons in attendance: Patient  Total session time: 50 minutes    Therapy was provided on this date in coordination with the [facility] approved Clinical Supervisor under the direct supervision of Dr. Jason Brown who was on site during this visit.     Objective Observations:   Participation:Active verbal participation, Attentive, Engaged, and Open to feedback   Grooming:Good and Casual   Cognition:Fully Oriented and Drowsy/Somnolent   Eye Contact:Good   Mood:Depressed and Anxious   Affect:Constricted and Congruent with content   Thought Process:Logical   Speech:Rate within normal limits and Volume within normal limits    Current Risk:   Suicide: low   Homicide: low   Self-Harm: low   Relapse: low   Safety Plan Reviewed: not applicable    Topics addressed in psychotherapy include:     Kasie stated that she was struggling this week, and her affect was congruent.     Her son Enoch is now living with his aunt on a farm in [place] and it is difficult for Kasie to accept that he is gone from the home.     She stated that she feels like there are multiple aspects of her life that are out of balance. She hasn't really talked to her father much since her last session. He is in a really dark place where he is mad at healthcare workers. She and the therapist processed some of her abandonment issues with her father. She expressed anger towards him, and the therapist validated this anger. She has had many experiences where  she was not able to say goodbye to some family members upon their deaths, and she would like to have the chance to do so with her father. She is trying to figure out a time to have a conversation with her father, but does not know when to do this.     The therapist and Kasie talked about the level of support that she is getting from Remy, and she stated that he can be very dismissive. Processed feelings of shame with experiencing mental illness and not feeling like she has any support. She stated that she has always needed support, but does not know what it looks like for her. She stated that Remy is supportive in other ways, but that she could really use a bit more from him.    The therapist and Kasie talked about ways that she can get more physical intimacy from Remy, and also feel more supported by him. Kasie stated that she will work on expressing what she needs from Remy.     Care Plan Updated: No    Does patient express agreement with the above plan? Yes     Diagnosis:  1. Generalized anxiety disorder    2. Severe episode of recurrent major depressive disorder, without psychotic features (HCC)        Referral appointment(s) scheduled? Yes       Gilberto Alvarado PsyD  Clinical Psychologist Assistant   [place] #

## 2022-12-02 ENCOUNTER — PATIENT MESSAGE (OUTPATIENT)
Dept: MEDICAL GROUP | Facility: PHYSICIAN GROUP | Age: 47
End: 2022-12-02
Payer: COMMERCIAL

## 2022-12-02 DIAGNOSIS — E89.41 HOT FLASHES DUE TO SURGICAL MENOPAUSE: ICD-10-CM

## 2022-12-14 NOTE — PROGRESS NOTES
"[facility]   Therapy Progress Note      This visit was conducted via Zoom using secure and encrypted videoconferencing technology.  The patient was in a private location in the [facility] [place].  The patient's identity was confirmed and verbal consent was obtained for this virtual visit.  Place of Service: POS 10 -The patient is at Home during their visit      Name: Kasie Rodriguez  MRN: [id number]  : 1975  Age: 47 y.o.  Date of assessment: 12/15/2022  PCP: Leslie Jean-Baptiste D.O.  Persons in attendance: Patient  Total session time: 50 minutes    Therapy was provided on this date in coordination with the [facility] approved Clinical Supervisor under the direct supervision of Dr. Jason Brown who was on site during this visit.     Objective Observations:   Participation:Active verbal participation, Attentive, Engaged, and Resistant   Grooming:Good and Casual   Cognition:Alert and Fully Oriented   Eye Contact:Good   Mood:Euthymic   Affect:Constricted, Congruent with content, and Sad   Thought Process:Logical and Goal-directed   Speech:Rate within normal limits and Volume within normal limits    Current Risk:   Suicide: low   Homicide: low   Self-Harm: low   Relapse: low   Safety Plan Reviewed: not applicable    Topics addressed in psychotherapy include:     Kasie stated that she was struggling today, and her affect was congruent. She went to visit her Dad last weekend, and it was very difficult for her. She stated that he has declined very quickly, and she is not sure that she will be able to see him again before he passes. She is feeling very unsettled. She stated that she has thought of what she would like to say, but she is not sure that she wants to say it. \"It wouldn't change anything.\"    She wants to know why she wasn't important enough, and \"why he did not want me?\" She talked about some of her abandonment issues that she has from her father dropping her off at 's houses when she " would be on his weekend    She talked to her father about her wishes to have a  for him. Processed the meaning of death of a parents, and she mentioned that this experience is making her come to terms with her own mortality.     She and the therapist talked about how this situation is affecting her health and her experience of work. Explored some reasonable exercises that she could do. The therapist commended Kasie for talking to Remy about what she needs from him. They also discussed ways to get Remy to support Kasie in her goals of taking better care of her health.     Care Plan Updated: No    Does patient express agreement with the above plan? Yes     Diagnosis:  1. Generalized anxiety disorder    2. Severe episode of recurrent major depressive disorder, without psychotic features (HCC)        Referral appointment(s) scheduled? Yes       Gilberto Alvarado PsyD  Clinical Psychologist Assistant   [place] #

## 2022-12-15 ENCOUNTER — TELEMEDICINE (OUTPATIENT)
Dept: BEHAVIORAL HEALTH | Facility: CLINIC | Age: 47
End: 2022-12-15
Payer: COMMERCIAL

## 2022-12-15 DIAGNOSIS — F41.1 GENERALIZED ANXIETY DISORDER: ICD-10-CM

## 2022-12-15 DIAGNOSIS — F33.2 SEVERE EPISODE OF RECURRENT MAJOR DEPRESSIVE DISORDER, WITHOUT PSYCHOTIC FEATURES (HCC): ICD-10-CM

## 2022-12-15 PROCEDURE — 90834 PSYTX W PT 45 MINUTES: CPT | Mod: 95 | Performed by: PSYCHIATRY & NEUROLOGY

## 2022-12-15 RX ORDER — ESTRADIOL 0.05 MG/D
1 PATCH, EXTENDED RELEASE TRANSDERMAL
Qty: 12 PATCH | Refills: 3 | Status: SHIPPED
Start: 2022-12-15 | End: 2023-02-14

## 2022-12-28 DIAGNOSIS — F51.04 PSYCHOPHYSIOLOGICAL INSOMNIA: ICD-10-CM

## 2022-12-28 NOTE — TELEPHONE ENCOUNTER
Received request via: Patient    Was the patient seen in the last year in this department? Yes    Does the patient have an active prescription (recently filled or refills available) for medication(s) requested? No    Does the patient have [facility] Plus and need 100 day supply (blood pressure, diabetes and cholesterol meds only)? Medication is not for cholesterol, blood pressure or diabetes

## 2022-12-29 RX ORDER — ZOLPIDEM TARTRATE 5 MG/1
5 TABLET ORAL NIGHTLY PRN
Qty: 30 TABLET | Refills: 0 | Status: SHIPPED | OUTPATIENT
Start: 2022-12-29 | End: 2023-01-03 | Stop reason: SDUPTHER

## 2023-01-03 ENCOUNTER — TELEMEDICINE (OUTPATIENT)
Dept: BEHAVIORAL HEALTH | Facility: CLINIC | Age: 48
End: 2023-01-03
Payer: COMMERCIAL

## 2023-01-03 DIAGNOSIS — F51.04 PSYCHOPHYSIOLOGICAL INSOMNIA: ICD-10-CM

## 2023-01-03 DIAGNOSIS — F43.10 PTSD (POST-TRAUMATIC STRESS DISORDER): ICD-10-CM

## 2023-01-03 DIAGNOSIS — F41.1 GENERALIZED ANXIETY DISORDER: ICD-10-CM

## 2023-01-03 DIAGNOSIS — E66.9 OBESITY, UNSPECIFIED CLASSIFICATION, UNSPECIFIED OBESITY TYPE, UNSPECIFIED WHETHER SERIOUS COMORBIDITY PRESENT: ICD-10-CM

## 2023-01-03 DIAGNOSIS — F50.81 BINGE EATING DISORDER: ICD-10-CM

## 2023-01-03 DIAGNOSIS — R06.02 SHORTNESS OF BREATH: ICD-10-CM

## 2023-01-03 DIAGNOSIS — F33.2 SEVERE EPISODE OF RECURRENT MAJOR DEPRESSIVE DISORDER, WITHOUT PSYCHOTIC FEATURES (HCC): ICD-10-CM

## 2023-01-03 DIAGNOSIS — Z79.899 LITHIUM USE: ICD-10-CM

## 2023-01-03 RX ORDER — LITHIUM CARBONATE 450 MG
450 TABLET, EXTENDED RELEASE ORAL NIGHTLY
Qty: 90 TABLET | Refills: 0 | Status: SHIPPED | OUTPATIENT
Start: 2023-01-03 | End: 2023-04-04

## 2023-01-03 RX ORDER — ZOLPIDEM TARTRATE 5 MG/1
5 TABLET ORAL NIGHTLY PRN
Qty: 30 TABLET | Refills: 0 | Status: SHIPPED | OUTPATIENT
Start: 2023-01-03 | End: 2023-02-24 | Stop reason: SDUPTHER

## 2023-01-03 RX ORDER — DULOXETIN HYDROCHLORIDE 60 MG/1
120 CAPSULE, DELAYED RELEASE ORAL DAILY
Qty: 180 CAPSULE | Refills: 1 | Status: SHIPPED | OUTPATIENT
Start: 2023-01-03 | End: 2023-02-24 | Stop reason: SDUPTHER

## 2023-01-03 ASSESSMENT — ENCOUNTER SYMPTOMS
DEPRESSION: 1
AWAKENING FROM SLEEP: 1
DIFFICULTY FALLING ASLEEP: 1
INSOMNIA: 1
EYES NEGATIVE: 1
HYPERSEXUAL: 0
CARDIOVASCULAR NEGATIVE: 1
HOPELESSNESS: 1
DIZZINESS: 1
EUPHORIC MOOD: 0
THOUGHT CONTENT - SHAME: 1
PARANOIA: 0
PREMATURE MORNING AWAKENING: 1
BACK PAIN: 1
PREOCCUPATION: 1
TREMORS: 1
MEMORY LOSS: 1
SNORING: 1
DISTURBING DREAMS: 1
HALLUCINATIONS: 0
GASTROINTESTINAL NEGATIVE: 1
DELUSIONS: 0
WEIGHT GAIN: 1
MYALGIAS: 1
NERVOUS/ANXIOUS: 1

## 2023-01-03 ASSESSMENT — LIFESTYLE VARIABLES: SUBSTANCE_ABUSE: 0
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
## 2023-01-12 NOTE — PROGRESS NOTES
[facility]   Therapy Progress Note      This visit was conducted via Zoom using secure and encrypted videoconferencing technology.  The patient was in a private location in the [facility].  The patient's identity was confirmed and verbal consent was obtained for this virtual visit.  Place of Service: POS 10 -The patient is at Home during their visit       Name: Kasie Rodriguez  MRN: [id number]  : 1975  Age: 47 y.o.  Date of assessment: 2023  PCP: Leslie Jean-Baptiste D.O.  Persons in attendance: Patient  Total session time: 50 minutes    Therapy was provided on this date in coordination with the [facility] approved Clinical Supervisor under the direct supervision of Dr. Amador Christina who was on site during this visit.    Objective Observations:   Participation:Active verbal participation, Attentive, and Engaged   Grooming:Adequate and Casual   Cognition:Alert and Fully Oriented   Eye Contact:Good   Mood:Depressed and Anxious   Affect:Congruent with content, Sad, Anxious, and Tearful   Thought Process:Logical and Circumstantial   Speech:Rate within normal limits and Volume within normal limits    Current Risk:   Suicide: low   Homicide: low   Self-Harm: low   Relapse: low   Safety Plan Reviewed: not applicable    Topics addressed in psychotherapy include:     Kasie stated that she was struggling today, and her affect was congruent.     She stated that her father passed away on Wednesday. She also just recently lost her father this past Wednesday, and was planning on going to visit him this week. The therapist listened with empathy and validated Kasie's feelings. She stated that she has a lot of questions about her father death because she was just informed when it happened and not given any forwarning.     The therapist explored with Kasie how she can get some clarity around her father's death. She stated that she is really missing him and missing the fact that she was not able to say  what she wanted to him. She also stated that she wanted to hear some things from him, such as apologies and explanations. The therapist explored this with Kasie and validated her expressions.     The therapist explored some of the emotions that are coming up for Kasie specifically around the man that she wished her father was and the man that he actually was. She stated that she is experiencing extreme sadness, but does not know what else she is feeling. The therapist encouraged her to spend some time reflecting on what emotions are coming up for her, to process next session.         Care Plan Updated: No    Does patient express agreement with the above plan? Yes     Diagnosis:  1. Generalized anxiety disorder    2. Severe episode of recurrent major depressive disorder, without psychotic features (HCC)        Referral appointment(s) scheduled? Yes       Gilberto Alvarado PsyD  Clinical Psychologist Assistant   [place] #

## 2023-01-13 ENCOUNTER — TELEMEDICINE (OUTPATIENT)
Dept: BEHAVIORAL HEALTH | Facility: CLINIC | Age: 48
End: 2023-01-13
Payer: COMMERCIAL

## 2023-01-13 DIAGNOSIS — F41.1 GENERALIZED ANXIETY DISORDER: ICD-10-CM

## 2023-01-13 DIAGNOSIS — F33.2 SEVERE EPISODE OF RECURRENT MAJOR DEPRESSIVE DISORDER, WITHOUT PSYCHOTIC FEATURES (HCC): ICD-10-CM

## 2023-01-13 PROCEDURE — 90834 PSYTX W PT 45 MINUTES: CPT | Mod: 95 | Performed by: PSYCHIATRY & NEUROLOGY

## 2023-02-14 ENCOUNTER — OFFICE VISIT (OUTPATIENT)
Dept: MEDICAL GROUP | Facility: PHYSICIAN GROUP | Age: 48
End: 2023-02-14
Payer: COMMERCIAL

## 2023-02-14 VITALS
HEART RATE: 75 BPM | WEIGHT: 271 LBS | DIASTOLIC BLOOD PRESSURE: 56 MMHG | HEIGHT: 68 IN | OXYGEN SATURATION: 94 % | TEMPERATURE: 97 F | BODY MASS INDEX: 41.07 KG/M2 | SYSTOLIC BLOOD PRESSURE: 96 MMHG | RESPIRATION RATE: 20 BRPM

## 2023-02-14 DIAGNOSIS — Z01.84 IMMUNITY STATUS TESTING: ICD-10-CM

## 2023-02-14 DIAGNOSIS — J45.40 MODERATE PERSISTENT ASTHMA WITHOUT COMPLICATION: ICD-10-CM

## 2023-02-14 DIAGNOSIS — Z23 NEED FOR VACCINATION: ICD-10-CM

## 2023-02-14 DIAGNOSIS — E89.41 HOT FLASHES DUE TO SURGICAL MENOPAUSE: ICD-10-CM

## 2023-02-14 PROCEDURE — 99214 OFFICE O/P EST MOD 30 MIN: CPT | Mod: 25 | Performed by: STUDENT IN AN ORGANIZED HEALTH CARE EDUCATION/TRAINING PROGRAM

## 2023-02-14 PROCEDURE — 90471 IMMUNIZATION ADMIN: CPT | Performed by: STUDENT IN AN ORGANIZED HEALTH CARE EDUCATION/TRAINING PROGRAM

## 2023-02-14 PROCEDURE — 90677 PCV20 VACCINE IM: CPT | Performed by: STUDENT IN AN ORGANIZED HEALTH CARE EDUCATION/TRAINING PROGRAM

## 2023-02-14 RX ORDER — LEVALBUTEROL TARTRATE 45 UG/1
1-2 AEROSOL, METERED ORAL EVERY 4 HOURS PRN
Qty: 30 G | Refills: 3 | Status: SHIPPED | OUTPATIENT
Start: 2023-02-14 | End: 2023-05-22 | Stop reason: SDUPTHER

## 2023-02-14 RX ORDER — ESTRADIOL 0.1 MG/D
FILM, EXTENDED RELEASE TRANSDERMAL
Qty: 24 PATCH | Refills: 0 | Status: SHIPPED | OUTPATIENT
Start: 2023-02-14

## 2023-02-14 ASSESSMENT — PATIENT HEALTH QUESTIONNAIRE - PHQ9
CLINICAL INTERPRETATION OF PHQ2 SCORE: 6
5. POOR APPETITE OR OVEREATING: 0 - NOT AT ALL
SUM OF ALL RESPONSES TO PHQ QUESTIONS 1-9: 16

## 2023-02-14 ASSESSMENT — FIBROSIS 4 INDEX: FIB4 SCORE: 1.11

## 2023-02-14 NOTE — PROGRESS NOTES
Subjective:     Chief Complaint   Patient presents with    Follow-Up    Medication Refill     Inhaler refill      HPI:   Kasie presents today for follow-up and medication refill.    She is moving to [place] in March. Father  recently.  Her mother is moving from [place] to [place] and she has a job out there and plans to take care of her.    Unfortunately has not completed the imaging requested by pulmonary medicine.  Still out of breath after approximately 100 feet.  Uses Xopenex which does help.  Continues with her maintenance inhaler.    States that current dose of estrogen 0.05 mg is better than the lower dose but still not as effective as her prior 0.1 mg dose.    Current Outpatient Medications Ordered in Epic   Medication Sig Dispense Refill    estradiol (VIVELLE-DOT) 0.1 MG/24HR PATCH BIWEEKLY Apply  to skin as directed 2 times a week. Use one patch two times per week. 24 Patch 0    levalbuterol (XOPENEX HFA) 45 MCG/ACT inhaler Inhale 1-2 Puffs every four hours as needed for Shortness of Breath (asthma symptoms). 30 g 3    DULoxetine (CYMBALTA) 60 MG Cap DR Particles delayed-release capsule Take 2 Capsules by mouth every day for 90 days. 180 Capsule 1    lithium carbonate  MG Tab CR tablet Take 1 Tablet by mouth every evening for 90 days. 90 Tablet 0    propranolol CR (INDERAL LA) 80 MG CAPSULE SR 24 HR Take 1 Capsule by mouth every day. 90 Capsule 3    hydroCHLOROthiazide (HYDRODIURIL) 25 MG Tab Take 1 Tablet by mouth every day. 90 Tablet 3    pregabalin (LYRICA) 200 MG capsule Take 1 capsule by mouth in the morning and 2 capsules at night 270 Capsule 0    cyclobenzaprine (FLEXERIL) 10 mg Tab TAKE 1 TABLET BY MOUTH EVERY DAY 90 Tablet 1    promethazine (PHENERGAN) 25 MG Tab TAKE 1 TABLET BY MOUTH EVERY 6 HOURS AS NEEDED FOR NAUSEA AND VOMITING 30 Tablet 0    omeprazole (PRILOSEC) 40 MG delayed-release capsule Take 1 Capsule by mouth every day. 90 Capsule 3    RINVOQ 15 MG TABLET SR 24 HR        "rizatriptan (MAXALT) 10 MG tablet Take 1 Tablet by mouth one time as needed for Migraine for up to 1 dose. 9 Tablet 3    metaxalone (SKELAXIN) 800 MG Tab Take 1 Tablet by mouth every 12 hours as needed for Moderate Pain or Severe Pain. (Patient taking differently: Take 400-800 mg by mouth every 12 hours as needed for Moderate Pain or Severe Pain. 400mg in the AM and 800mg in the PM) 180 Tablet 3    WIXELA INHUB 250-50 MCG/ACT AEROSOL POWDER, BREATH ACTIVATED TAKE 1 PUFF BY MOUTH EVERY 12 HOURS 60 Each 3    atorvastatin (LIPITOR) 20 MG Tab Take 1 Tablet by mouth every day. 90 Tablet 3    Acetaminophen (TYLENOL PO) Take  by mouth.       No current Epic-ordered facility-administered medications on file.     ROS:  Gen: no fevers/chills, no changes in weight  Eyes: no changes in vision  ENT: no sore throat  Pulm: no sob, no cough  CV: no chest pain, no palpitations  GI: no nausea/vomiting, no diarrhea  MSk: no myalgias  Skin: no rash  Neuro: no severe headaches, +chronic tremor  Heme/Lymph: no easy bruising    Objective:     Exam:  BP 96/56 (BP Location: Left arm, Patient Position: Sitting, BP Cuff Size: Adult long)   Pulse 75   Temp 36.1 °C (97 °F) (Temporal)   Resp 20   Ht 1.727 m (5' 8\")   Wt 123 kg (271 lb)   LMP 07/28/2006   SpO2 94%   BMI 41.21 kg/m²  Body mass index is 41.21 kg/m².    Physical Exam:  Constitutional: Obese. Well-developed and well-nourished. No acute distress.   Skin: Skin is warm and dry. No rash noted.  Head: Atraumatic without lesions.  Eyes: Conjunctivae and extraocular motions are normal. Pupils are equal, round. No scleral icterus.   Mouth/Throat: Wearing mask.  Neck: Supple, trachea midline.   Cardiovascular: Regular rate and rhythm, S1 and S2 without murmur, rubs, or gallops.  Lungs: Normal inspiratory effort, CTA bilaterally, no wheezes/rhonchi/rales  Neurological: Alert and oriented x 3. No gross/focal deficits.  Psychiatric:    Appearance: Clothing and grooming normal.  Mood: " grieving  Behavior: No psychomotor abnormalities or impulsivity. Attention is good. Patient is pleasant and cooperative.   Eye contact: good   Affect: normal  Speech/thought content: Normal speech pattern. Normal insight and reasoning, no evidence of psychotic process. Reports suicidal thoughts, no plans or action.    Assessment & Plan:     47 y.o. female with the following -     1. Immunity status testing  Patient not entirely sure of the list of titer she needs.  She will let me know via [facility] so I can order them.    2. 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  Increase estradiol to 0.1 mg as below.  - estradiol (VIVELLE-DOT) 0.1 MG/24HR PATCH BIWEEKLY; Apply  to skin as directed 2 times a week. Use one patch two times per week.  Dispense: 24 Patch; Refill: 0    3. Moderate persistent asthma without complication  This is a chronic condition, stable.  Needs to establish with new pulmonary medicine and have imaging completed for chronic dyspnea on exertion.  She will continue with maintenance inhaler (Wixela) as well as Xopenex as needed.  - levalbuterol (XOPENEX HFA) 45 MCG/ACT inhaler; Inhale 1-2 Puffs every four hours as needed for Shortness of Breath (asthma symptoms).  Dispense: 30 g; Refill: 3    4. Need for vaccination  - Pneumococcal Conjugate Vaccine 20-Valent (19 yrs+)    Return if symptoms worsen or fail to improve.    Please note that this dictation was created using voice recognition software. I have made every reasonable attempt to correct obvious errors, but I expect that there are errors of grammar and possibly content that I did not discover before finalizing the note.

## 2023-02-17 DIAGNOSIS — G43.109 MIGRAINE WITH AURA AND WITHOUT STATUS MIGRAINOSUS, NOT INTRACTABLE: ICD-10-CM

## 2023-02-17 NOTE — TELEPHONE ENCOUNTER
Received request via: Patient    Was the patient seen in the last year in this department? Yes    Does the patient have an active prescription (recently filled or refills available) for medication(s) requested? No    Does the patient have correction Plus and need 100 day supply (blood pressure, diabetes and cholesterol meds only)? Medication is not for cholesterol, blood pressure or diabetes    Previous patient of Dr. León. Needs to est. With new provider.

## 2023-02-21 RX ORDER — RIZATRIPTAN BENZOATE 10 MG/1
10 TABLET ORAL
Qty: 9 TABLET | Refills: 3 | Status: SHIPPED | OUTPATIENT
Start: 2023-02-21

## 2023-02-23 ENCOUNTER — PATIENT MESSAGE (OUTPATIENT)
Dept: BEHAVIORAL HEALTH | Facility: CLINIC | Age: 48
End: 2023-02-23
Payer: COMMERCIAL

## 2023-02-23 DIAGNOSIS — F51.04 PSYCHOPHYSIOLOGICAL INSOMNIA: ICD-10-CM

## 2023-02-24 DIAGNOSIS — F51.04 PSYCHOPHYSIOLOGICAL INSOMNIA: ICD-10-CM

## 2023-02-24 RX ORDER — DULOXETIN HYDROCHLORIDE 60 MG/1
120 CAPSULE, DELAYED RELEASE ORAL DAILY
Qty: 180 CAPSULE | Refills: 1 | Status: SHIPPED | OUTPATIENT
Start: 2023-02-24 | End: 2023-05-25

## 2023-02-24 RX ORDER — ZOLPIDEM TARTRATE 5 MG/1
5 TABLET ORAL NIGHTLY PRN
Qty: 30 TABLET | Refills: 0 | Status: SHIPPED | OUTPATIENT
Start: 2023-02-24 | End: 2023-04-07 | Stop reason: SDUPTHER

## 2023-02-24 RX ORDER — ZOLPIDEM TARTRATE 5 MG/1
5 TABLET ORAL NIGHTLY PRN
Qty: 30 TABLET | Refills: 0 | Status: SHIPPED | OUTPATIENT
Start: 2023-02-24 | End: 2023-02-24 | Stop reason: SDUPTHER

## 2023-02-24 NOTE — PATIENT COMMUNICATION
Andres pt out of meds by today and going out of town Monday requesting refill before hand..Thank you       Received request via: Patient    Was the patient seen in the last year in this department? Yes    Does the patient have an active prescription (recently filled or refills available) for medication(s) requested? No    Does the patient have [facility] Plus and need 100 day supply (blood pressure, diabetes and cholesterol meds only)? Medication is not for cholesterol, blood pressure or diabetes

## 2023-03-02 ENCOUNTER — PATIENT MESSAGE (OUTPATIENT)
Dept: PHYSICAL MEDICINE AND REHAB | Facility: MEDICAL CENTER | Age: 48
End: 2023-03-02
Payer: COMMERCIAL

## 2023-03-24 ENCOUNTER — DOCUMENTATION (OUTPATIENT)
Dept: BEHAVIORAL HEALTH | Facility: CLINIC | Age: 48
End: 2023-03-24
Payer: COMMERCIAL

## 2023-04-04 RX ORDER — LITHIUM CARBONATE 450 MG
TABLET, EXTENDED RELEASE ORAL
Qty: 90 TABLET | Refills: 0 | Status: SHIPPED | OUTPATIENT
Start: 2023-04-04 | End: 2023-05-16 | Stop reason: SDUPTHER

## 2023-04-07 DIAGNOSIS — F51.04 PSYCHOPHYSIOLOGICAL INSOMNIA: ICD-10-CM

## 2023-04-07 RX ORDER — ZOLPIDEM TARTRATE 5 MG/1
5 TABLET ORAL NIGHTLY PRN
Qty: 30 TABLET | Refills: 0 | Status: SHIPPED | OUTPATIENT
Start: 2023-04-07 | End: 2023-05-07

## 2023-04-25 ENCOUNTER — DOCUMENTATION (OUTPATIENT)
Dept: BEHAVIORAL HEALTH | Facility: CLINIC | Age: 48
End: 2023-04-25
Payer: COMMERCIAL

## 2023-05-12 RX ORDER — LITHIUM CARBONATE 450 MG
450 TABLET, EXTENDED RELEASE ORAL EVERY EVENING
Qty: 90 TABLET | Refills: 1 | Status: CANCELLED | OUTPATIENT
Start: 2023-05-12 | End: 2023-11-08

## 2023-05-16 DIAGNOSIS — F51.04 PSYCHOPHYSIOLOGICAL INSOMNIA: ICD-10-CM

## 2023-05-16 RX ORDER — LITHIUM CARBONATE 450 MG
450 TABLET, EXTENDED RELEASE ORAL EVERY EVENING
Qty: 30 TABLET | Refills: 2 | Status: SHIPPED | OUTPATIENT
Start: 2023-05-16 | End: 2023-08-14

## 2023-05-22 DIAGNOSIS — J45.40 MODERATE PERSISTENT ASTHMA WITHOUT COMPLICATION: ICD-10-CM

## 2023-05-23 RX ORDER — LEVALBUTEROL TARTRATE 45 UG/1
1-2 AEROSOL, METERED ORAL EVERY 4 HOURS PRN
Qty: 30 G | Refills: 3 | Status: SHIPPED | OUTPATIENT
Start: 2023-05-23

## 2023-06-16 DIAGNOSIS — F51.04 PSYCHOPHYSIOLOGICAL INSOMNIA: ICD-10-CM

## 2023-06-16 RX ORDER — ZOLPIDEM TARTRATE 5 MG/1
5 TABLET ORAL NIGHTLY PRN
Qty: 30 TABLET | Refills: 0 | Status: SHIPPED | OUTPATIENT
Start: 2023-06-16 | End: 2023-07-16

## 2023-09-25 ENCOUNTER — APPOINTMENT (OUTPATIENT)
Dept: URBAN - METROPOLITAN AREA CLINIC 307 | Age: 48
Setting detail: DERMATOLOGY
End: 2023-09-25

## 2023-09-25 DIAGNOSIS — Z79.899 OTHER LONG TERM (CURRENT) DRUG THERAPY: ICD-10-CM

## 2023-09-25 DIAGNOSIS — L29.8 OTHER PRURITUS: ICD-10-CM

## 2023-09-25 DIAGNOSIS — D49.2 NEOPLASM OF UNSPECIFIED BEHAVIOR OF BONE, SOFT TISSUE, AND SKIN: ICD-10-CM

## 2023-09-25 DIAGNOSIS — L20.89 OTHER ATOPIC DERMATITIS: ICD-10-CM

## 2023-09-25 PROCEDURE — 11102 TANGNTL BX SKIN SINGLE LES: CPT

## 2023-09-25 PROCEDURE — OTHER HIGH RISK MEDICATION MONITORING: OTHER

## 2023-09-25 PROCEDURE — OTHER RINVOQ MONITORING: OTHER

## 2023-09-25 PROCEDURE — OTHER OTHER: OTHER

## 2023-09-25 PROCEDURE — OTHER INTRAMUSCULAR KENALOG: OTHER

## 2023-09-25 PROCEDURE — OTHER REFERRAL CORRESPONDENCE: OTHER

## 2023-09-25 PROCEDURE — OTHER PRESCRIPTION: OTHER

## 2023-09-25 PROCEDURE — 86580 TB INTRADERMAL TEST: CPT

## 2023-09-25 PROCEDURE — 99203 OFFICE O/P NEW LOW 30 MIN: CPT | Mod: 25

## 2023-09-25 PROCEDURE — OTHER MIPS QUALITY: OTHER

## 2023-09-25 PROCEDURE — OTHER COUNSELING: OTHER

## 2023-09-25 PROCEDURE — OTHER MEDICATION COUNSELING: OTHER

## 2023-09-25 PROCEDURE — OTHER BIOPSY BY SHAVE METHOD: OTHER

## 2023-09-25 PROCEDURE — 96372 THER/PROPH/DIAG INJ SC/IM: CPT | Mod: 59

## 2023-09-25 PROCEDURE — OTHER PHOTO-DOCUMENTATION: OTHER

## 2023-09-25 PROCEDURE — OTHER PPD: OTHER

## 2023-09-25 RX ORDER — UPADACITINIB 15 MG/1
TABLET, EXTENDED RELEASE ORAL
Qty: 30 | Refills: 11 | Status: ERX | COMMUNITY
Start: 2023-09-25

## 2023-09-25 RX ORDER — TRIAMCINOLONE ACETONIDE 1 MG/G
CREAM TOPICAL
Qty: 454 | Refills: 5 | Status: ERX | COMMUNITY
Start: 2023-09-25

## 2023-09-25 ASSESSMENT — LOCATION SIMPLE DESCRIPTION DERM
LOCATION SIMPLE: LEFT DELTOID
LOCATION SIMPLE: LEFT FOREARM
LOCATION SIMPLE: RIGHT PRETIBIAL REGION
LOCATION SIMPLE: RIGHT FOREARM
LOCATION SIMPLE: LEFT THIGH

## 2023-09-25 ASSESSMENT — LOCATION ZONE DERM
LOCATION ZONE: ARM
LOCATION ZONE: SHOULDER
LOCATION ZONE: LEG

## 2023-09-25 ASSESSMENT — LOCATION DETAILED DESCRIPTION DERM
LOCATION DETAILED: LEFT VENTRAL PROXIMAL FOREARM
LOCATION DETAILED: RIGHT VENTRAL DISTAL FOREARM
LOCATION DETAILED: LEFT ANTERIOR LATERAL DISTAL THIGH
LOCATION DETAILED: RIGHT PROXIMAL PRETIBIAL REGION
LOCATION DETAILED: LEFT ANTERIOR PROXIMAL THIGH
LOCATION DETAILED: LEFT DELTOID

## 2023-09-25 ASSESSMENT — ITCH NUMERIC RATING SCALE: HOW SEVERE IS YOUR ITCHING?: 8

## 2023-09-25 ASSESSMENT — BSA ECZEMA: % BODY COVERED IN ECZEMA: 8

## 2023-09-25 ASSESSMENT — SEVERITY ASSESSMENT 2020: SEVERITY 2020: MILD

## 2023-09-25 NOTE — HPI: RASH
What Type Of Note Output Would You Prefer (Optional)?: Bullet Format
How Severe Is Your Rash?: mild
Is This A New Presentation, Or A Follow-Up?: Rash
Additional History: Pt was seeing a doctor in [place] and was prescribed rinvoque and it was working. I have tried Clobetasol, Triamcinolone, and other steroid creams and nothing worked.I have been off of the rinvoque for about a month and am starting flare up.

## 2023-09-25 NOTE — PROCEDURE: OTHER
Note Text (......Xxx Chief Complaint.): This diagnosis correlates with the
Render Risk Assessment In Note?: no
Detail Level: Zone
Other (Free Text): Pt had labs previously drawn. Will request labs from Dr. Rodriguez

## 2023-09-25 NOTE — PROCEDURE: RINVOQ MONITORING
Current Dosage (Optional): 15mg Daily
Detail Level: Zone
Comments: Been off for one month due to relocation starting to break out.
Length Of Therapy (Optional): 1 year
Add High Risk Medication Management Associated Diagnosis?: No

## 2023-09-25 NOTE — PROCEDURE: MEDICATION COUNSELING
Xelrobbyz Pregnancy And Lactation Text: This medication is Pregnancy Category D and is not considered safe during pregnancy.  The risk during breast feeding is also uncertain.

## 2023-09-27 ENCOUNTER — APPOINTMENT (OUTPATIENT)
Dept: URBAN - METROPOLITAN AREA CLINIC 307 | Age: 48
Setting detail: DERMATOLOGY
End: 2023-09-27

## 2023-09-27 DIAGNOSIS — Z419 UNSPECIFIED ELECTIVE SURGERY FOR PURPOSES OTHER THAN REMEDYING HEALTH STATES: ICD-10-CM

## 2023-09-27 PROCEDURE — OTHER PPD RESULT: OTHER

## 2023-09-27 PROCEDURE — OTHER MIPS QUALITY: OTHER

## 2023-09-27 ASSESSMENT — LOCATION ZONE DERM: LOCATION ZONE: ARM

## 2023-09-27 ASSESSMENT — LOCATION SIMPLE DESCRIPTION DERM: LOCATION SIMPLE: RIGHT FOREARM

## 2023-09-27 ASSESSMENT — LOCATION DETAILED DESCRIPTION DERM: LOCATION DETAILED: RIGHT VENTRAL DISTAL FOREARM

## 2023-09-28 ENCOUNTER — TELEPHONE (OUTPATIENT)
Dept: PHYSICAL MEDICINE AND REHAB | Facility: MEDICAL CENTER | Age: 48
End: 2023-09-28

## 2023-09-28 NOTE — TELEPHONE ENCOUNTER
Phone Number Called: [phone]    Call outcome:  Voicemail box was full could not leave message    Message: Was calling to let Kasie know that we cannot refill the prescription for metaxalone and that she will need to come into clinic for a visit to talk about getting a refill for the script.

## 2023-10-12 ENCOUNTER — RX ONLY (RX ONLY)
Age: 48
End: 2023-10-12

## 2023-10-12 RX ORDER — UPADACITINIB 15 MG/1
TABLET, EXTENDED RELEASE ORAL
Qty: 30 | Refills: 11 | Status: ERX

## 2023-10-31 NOTE — PROCEDURE: MEDICATION COUNSELING
Rhombic Flap Text: The defect edges were debeveled with a #15 scalpel blade.  Given the location of the defect and the proximity to free margins a rhombic flap was deemed most appropriate.  Using a sterile surgical marker, an appropriate rhombic flap was drawn incorporating the defect.    The area thus outlined was incised deep to adipose tissue with a #15 scalpel blade.  The skin margins were undermined to an appropriate distance in all directions utilizing iris scissors. Rhofade Counseling: Rhofade is a topical medication which can decrease superficial blood flow where applied. Side effects are uncommon and include stinging, redness and allergic reactions.

## 2024-05-29 ENCOUNTER — RX ONLY (RX ONLY)
Age: 49
End: 2024-05-29

## 2024-05-29 RX ORDER — UPADACITINIB 15 MG/1
TABLET, EXTENDED RELEASE ORAL
Qty: 30 | Refills: 11 | Status: ERX

## 2024-07-11 ENCOUNTER — RX ONLY (RX ONLY)
Age: 49
End: 2024-07-11

## 2024-07-11 RX ORDER — UPADACITINIB 15 MG/1
TABLET, EXTENDED RELEASE ORAL
Qty: 30 | Refills: 11 | Status: ERX

## 2024-07-17 ENCOUNTER — APPOINTMENT (OUTPATIENT)
Dept: URBAN - METROPOLITAN AREA CLINIC 307 | Age: 49
Setting detail: DERMATOLOGY
End: 2024-07-18

## 2024-07-17 DIAGNOSIS — L74.51 PRIMARY FOCAL HYPERHIDROSIS: ICD-10-CM

## 2024-07-17 DIAGNOSIS — L0390 CELLULITIS AND ABSCESS OF UNSPECIFIED SITES: ICD-10-CM

## 2024-07-17 DIAGNOSIS — L0391 CELLULITIS AND ABSCESS OF UNSPECIFIED SITES: ICD-10-CM

## 2024-07-17 DIAGNOSIS — L53.8 OTHER SPECIFIED ERYTHEMATOUS CONDITIONS: ICD-10-CM

## 2024-07-17 PROBLEM — L74.519 PRIMARY FOCAL HYPERHIDROSIS, UNSPECIFIED: Status: ACTIVE | Noted: 2024-07-17

## 2024-07-17 PROBLEM — L02.214 CUTANEOUS ABSCESS OF GROIN: Status: ACTIVE | Noted: 2024-07-17

## 2024-07-17 PROCEDURE — OTHER COUNSELING: OTHER

## 2024-07-17 PROCEDURE — OTHER MEDICATION COUNSELING: OTHER

## 2024-07-17 PROCEDURE — OTHER INTRAMUSCULAR KENALOG: OTHER

## 2024-07-17 PROCEDURE — OTHER MIPS QUALITY: OTHER

## 2024-07-17 PROCEDURE — OTHER PRESCRIPTION: OTHER

## 2024-07-17 PROCEDURE — 99213 OFFICE O/P EST LOW 20 MIN: CPT | Mod: 25

## 2024-07-17 PROCEDURE — 96372 THER/PROPH/DIAG INJ SC/IM: CPT

## 2024-07-17 RX ORDER — DOXYCYCLINE HYCLATE 100 MG/1
CAPSULE, GELATIN COATED ORAL
Qty: 60 | Refills: 2 | Status: ERX | COMMUNITY
Start: 2024-07-17

## 2024-07-17 RX ORDER — CLINDAMYCIN PHOSPHATE 10 MG/G
GEL TOPICAL
Qty: 60 | Refills: 11 | Status: ERX | COMMUNITY
Start: 2024-07-17

## 2024-07-17 RX ORDER — GLYCOPYRROLATE 2 MG/1
TABLET ORAL
Qty: 60 | Refills: 3 | Status: ERX | COMMUNITY
Start: 2024-07-17

## 2024-07-17 ASSESSMENT — LOCATION ZONE DERM
LOCATION ZONE: SHOULDER
LOCATION ZONE: TRUNK

## 2024-07-17 ASSESSMENT — LOCATION SIMPLE DESCRIPTION DERM
LOCATION SIMPLE: GENITALIA
LOCATION SIMPLE: LEFT DELTOID

## 2024-07-17 ASSESSMENT — LOCATION DETAILED DESCRIPTION DERM
LOCATION DETAILED: LEFT DELTOID
LOCATION DETAILED: GENITALIA

## 2024-07-17 ASSESSMENT — BSA RASH: BSA RASH: 5

## 2024-07-17 ASSESSMENT — SEVERITY ASSESSMENT: SEVERITY: MODERATE

## 2024-07-17 NOTE — PROCEDURE: MEDICATION COUNSELING
Albendazole Pregnancy And Lactation Text: This medication is Pregnancy Category C and it isn't known if it is safe during pregnancy. It is also excreted in breast milk.
Acitretin Pregnancy And Lactation Text: This medication is Pregnancy Category X and should not be given to women who are pregnant or may become pregnant in the future. This medication is excreted in breast milk.
Cephalexin Pregnancy And Lactation Text: This medication is Pregnancy Category B and considered safe during pregnancy.  It is also excreted in breast milk but can be used safely for shorter doses.
Skyrizi Pregnancy And Lactation Text: The risk during pregnancy and breastfeeding is uncertain with this medication.
Arava Counseling:  Patient counseled regarding adverse effects of Arava including but not limited to nausea, vomiting, abnormalities in liver function tests. Patients may develop mouth sores, rash, diarrhea, and abnormalities in blood counts. The patient understands that monitoring is required including LFTs and blood counts.  There is a rare possibility of scarring of the liver and lung problems that can occur when taking methotrexate. Persistent nausea, loss of appetite, pale stools, dark urine, cough, and shortness of breath should be reported immediately. Patient advised to discontinue Arava treatment and consult with a physician prior to attempting conception. The patient will have to undergo a treatment to eliminate Arava from the body prior to conception.
Propranolol Counseling:  I discussed with the patient the risks of propranolol including but not limited to low heart rate, low blood pressure, low blood sugar, restlessness and increased cold sensitivity. They should call the office if they experience any of these side effects.
Solaraze Pregnancy And Lactation Text: This medication is Pregnancy Category B and is considered safe. There is some data to suggest avoiding during the third trimester. It is unknown if this medication is excreted in breast milk.
5-Fu Pregnancy And Lactation Text: This medication is Pregnancy Category X and contraindicated in pregnancy and in women who may become pregnant. It is unknown if this medication is excreted in breast milk.
Topical Ketoconazole Pregnancy And Lactation Text: This medication is Pregnancy Category B and is considered safe during pregnancy. It is unknown if it is excreted in breast milk.
Prednisone Counseling:  I discussed with the patient the risks of prolonged use of prednisone including but not limited to weight gain, insomnia, osteoporosis, mood changes, diabetes, susceptibility to infection, glaucoma and high blood pressure.  In cases where prednisone use is prolonged, patients should be monitored with blood pressure checks, serum glucose levels and an eye exam.  Additionally, the patient may need to be placed on GI prophylaxis, PCP prophylaxis, and calcium and vitamin D supplementation and/or a bisphosphonate.  The patient verbalized understanding of the proper use and the possible adverse effects of prednisone.  All of the patient's questions and concerns were addressed.
Cosentyx Counseling:  I discussed with the patient the risks of Cosentyx including but not limited to worsening of Crohn's disease, immunosuppression, allergic reactions and infections.  The patient understands that monitoring is required including a PPD at baseline and must alert us or the primary physician if symptoms of infection or other concerning signs are noted.
Azelaic Acid Counseling: Patient counseled that medicine may cause skin irritation and to avoid applying near the eyes.  In the event of skin irritation, the patient was advised to reduce the amount of the drug applied or use it less frequently.   The patient verbalized understanding of the proper use and possible adverse effects of azelaic acid.  All of the patient's questions and concerns were addressed.
Rinvoq Pregnancy And Lactation Text: Based on animal studies, Rinvoq may cause embryo-fetal harm when administered to pregnant women.  The medication should not be used in pregnancy.  Breastfeeding is not recommended during treatment and for 6 days after the last dose.
Erivedge Counseling- I discussed with the patient the risks of Erivedge including but not limited to nausea, vomiting, diarrhea, constipation, weight loss, changes in the sense of taste, decreased appetite, muscle spasms, and hair loss.  The patient verbalized understanding of the proper use and possible adverse effects of Erivedge.  All of the patient's questions and concerns were addressed.
Picato Counseling:  I discussed with the patient the risks of Picato including but not limited to erythema, scaling, itching, weeping, crusting, and pain.
Nsaids Pregnancy And Lactation Text: These medications are considered safe up to 30 weeks gestation. It is excreted in breast milk.
Detail Level: Simple
Use Enhanced Medication Counseling?: No
Minocycline Pregnancy And Lactation Text: This medication is Pregnancy Category D and not consider safe during pregnancy. It is also excreted in breast milk.
Dutasteride Pregnancy And Lactation Text: This medication is absolutely contraindicated in women, especially during pregnancy and breast feeding. Feminization of male fetuses is possible if taking while pregnant.
Glycopyrrolate Counseling:  I discussed with the patient the risks of glycopyrrolate including but not limited to skin rash, drowsiness, dry mouth, difficulty urinating, and blurred vision.
Infliximab Counseling:  I discussed with the patient the risks of infliximab including but not limited to myelosuppression, immunosuppression, autoimmune hepatitis, demyelinating diseases, lymphoma, and serious infections.  The patient understands that monitoring is required including a PPD at baseline and must alert us or the primary physician if symptoms of infection or other concerning signs are noted.
Imiquimod Pregnancy And Lactation Text: This medication is Pregnancy Category C. It is unknown if this medication is excreted in breast milk.
Terbinafine Pregnancy And Lactation Text: This medication is Pregnancy Category B and is considered safe during pregnancy. It is also excreted in breast milk and breast feeding isn't recommended.
Bexarotene Counseling:  I discussed with the patient the risks of bexarotene including but not limited to hair loss, dry lips/skin/eyes, liver abnormalities, hyperlipidemia, pancreatitis, depression/suicidal ideation, photosensitivity, drug rash/allergic reactions, hypothyroidism, anemia, leukopenia, infection, cataracts, and teratogenicity.  Patient understands that they will need regular blood tests to check lipid profile, liver function tests, white blood cell count, thyroid function tests and pregnancy test if applicable.
Clindamycin Counseling: I counseled the patient regarding use of clindamycin as an antibiotic for prophylactic and/or therapeutic purposes. Clindamycin is active against numerous classes of bacteria, including skin bacteria. Side effects may include nausea, diarrhea, gastrointestinal upset, rash, hives, yeast infections, and in rare cases, colitis.
Fluconazole Counseling:  Patient counseled regarding adverse effects of fluconazole including but not limited to headache, diarrhea, nausea, upset stomach, liver function test abnormalities, taste disturbance, and stomach pain.  There is a rare possibility of liver failure that can occur when taking fluconazole.  The patient understands that monitoring of LFTs and kidney function test may be required, especially at baseline. The patient verbalized understanding of the proper use and possible adverse effects of fluconazole.  All of the patient's questions and concerns were addressed.
Drysol Counseling:  I discussed with the patient the risks of drysol/aluminum chloride including but not limited to skin rash, itching, irritation, burning.
Vtama Pregnancy And Lactation Text: It is unknown if this medication can cause problems during pregnancy and breastfeeding.
Soolantra Counseling: I discussed with the patients the risks of topial Soolantra. This is a medicine which decreases the number of mites and inflammation in the skin. You experience burning, stinging, eye irritation or allergic reactions.  Please call our office if you develop any problems from using this medication.
Azathioprine Pregnancy And Lactation Text: This medication is Pregnancy Category D and isn't considered safe during pregnancy. It is unknown if this medication is excreted in breast milk.
Arava Pregnancy And Lactation Text: This medication is Pregnancy Category X and is absolutely contraindicated during pregnancy. It is unknown if it is excreted in breast milk.
Propranolol Pregnancy And Lactation Text: This medication is Pregnancy Category C and it isn't known if it is safe during pregnancy. It is excreted in breast milk.
Azelaic Acid Pregnancy And Lactation Text: This medication is considered safe during pregnancy and breast feeding.
Prednisone Pregnancy And Lactation Text: This medication is Pregnancy Category C and it isn't know if it is safe during pregnancy. This medication is excreted in breast milk.
Olanzapine Counseling- I discussed with the patient the common side effects of olanzapine including but are not limited to: lack of energy, dry mouth, increased appetite, sleepiness, tremor, constipation, dizziness, changes in behavior, or restlessness.  Explained that teenagers are more likely to experience headaches, abdominal pain, pain in the arms or legs, tiredness, and sleepiness.  Serious side effects include but are not limited: increased risk of death in elderly patients who are confused, have memory loss, or dementia-related psychosis; hyperglycemia; increased cholesterol and triglycerides; and weight gain.
Stelara Counseling:  I discussed with the patient the risks of ustekinumab including but not limited to immunosuppression, malignancy, posterior leukoencephalopathy syndrome, and serious infections.  The patient understands that monitoring is required including a PPD at baseline and must alert us or the primary physician if symptoms of infection or other concerning signs are noted.
Ivermectin Counseling:  Patient instructed to take medication on an empty stomach with a full glass of water.  Patient informed of potential adverse effects including but not limited to nausea, diarrhea, dizziness, itching, and swelling of the extremities or lymph nodes.  The patient verbalized understanding of the proper use and possible adverse effects of ivermectin.  All of the patient's questions and concerns were addressed.
Infliximab Pregnancy And Lactation Text: This medication is Pregnancy Category B and is considered safe during pregnancy. It is unknown if this medication is excreted in breast milk.
Klisyri Counseling:  I discussed with the patient the risks of Klisyri including but not limited to erythema, scaling, itching, weeping, crusting, and pain.
Glycopyrrolate Pregnancy And Lactation Text: This medication is Pregnancy Category B and is considered safe during pregnancy. It is unknown if it is excreted breast milk.
Topical Metronidazole Counseling: Metronidazole is a topical antibiotic medication. You may experience burning, stinging, redness, or allergic reactions.  Please call our office if you develop any problems from using this medication.
Zoryve Counseling:  I discussed with the patient that Zoryve is not for use in the eyes, mouth or vagina. The most commonly reported side effects include diarrhea, headache, insomnia, application site pain, upper respiratory tract infections, and urinary tract infections.  All of the patient's questions and concerns were addressed.
SSKI Counseling:  I discussed with the patient the risks of SSKI including but not limited to thyroid abnormalities, metallic taste, GI upset, fever, headache, acne, arthralgias, paraesthesias, lymphadenopathy, easy bleeding, arrhythmias, and allergic reaction.
Fluconazole Pregnancy And Lactation Text: This medication is Pregnancy Category C and it isn't know if it is safe during pregnancy. It is also excreted in breast milk.
Soolantra Pregnancy And Lactation Text: This medication is Pregnancy Category C. This medication is considered safe during breast feeding.
Clofazimine Counseling:  I discussed with the patient the risks of clofazimine including but not limited to skin and eye pigmentation, liver damage, nausea/vomiting, gastrointestinal bleeding and allergy.
Quinolones Counseling:  I discussed with the patient the risks of fluoroquinolones including but not limited to GI upset, allergic reaction, drug rash, diarrhea, dizziness, photosensitivity, yeast infections, liver function test abnormalities, tendonitis/tendon rupture.
Finasteride Male Counseling: Finasteride Counseling:  I discussed with the patient the risks of use of finasteride including but not limited to decreased libido, decreased ejaculate volume, gynecomastia, and depression. Women should not handle medication.  All of the patient's questions and concerns were addressed.
Sotyktu Counseling:  I discussed the most common side effects of Sotyktu including: common cold, sore throat, sinus infections, cold sores, canker sores, folliculitis, and acne.  I also discussed more serious side effects of Sotyktu including but not limited to: serious allergic reactions; increased risk for infections such as TB; cancers such as lymphomas; rhabdomyolysis and elevated CPK; and elevated triglycerides and liver enzymes. 
Cellcept Counseling:  I discussed with the patient the risks of mycophenolate mofetil including but not limited to infection/immunosuppression, GI upset, hypokalemia, hypercholesterolemia, bone marrow suppression, lymphoproliferative disorders, malignancy, GI ulceration/bleed/perforation, colitis, interstitial lung disease, kidney failure, progressive multifocal leukoencephalopathy, and birth defects.  The patient understands that monitoring is required including a baseline creatinine and regular CBC testing. In addition, patient must alert us immediately if symptoms of infection or other concerning signs are noted.
Bexarotene Pregnancy And Lactation Text: This medication is Pregnancy Category X and should not be given to women who are pregnant or may become pregnant. This medication should not be used if you are breast feeding.
Clindamycin Pregnancy And Lactation Text: This medication can be used in pregnancy if certain situations. Clindamycin is also present in breast milk.
Cimetidine Counseling:  I discussed with the patient the risks of Cimetidine including but not limited to gynecomastia, headache, diarrhea, nausea, drowsiness, arrhythmias, pancreatitis, skin rashes, psychosis, bone marrow suppression and kidney toxicity.
Libtayo Counseling- I discussed with the patient the risks of Libtayo including but not limited to nausea, vomiting, diarrhea, and bone or muscle pain.  The patient verbalized understanding of the proper use and possible adverse effects of Libtayo.  All of the patient's questions and concerns were addressed.
Topical Metronidazole Pregnancy And Lactation Text: This medication is Pregnancy Category B and considered safe during pregnancy.  It is also considered safe to use while breastfeeding.
Rituxan Counseling:  I discussed with the patient the risks of Rituxan infusions. Side effects can include infusion reactions, severe drug rashes including mucocutaneous reactions, reactivation of latent hepatitis and other infections and rarely progressive multifocal leukoencephalopathy.  All of the patient's questions and concerns were addressed.
Dupixent Counseling: I discussed with the patient the risks of dupilumab including but not limited to eye inflammation and irritation, cold sores, injection site reactions, allergic reactions and increased risk of parasitic infection. The patient understands that monitoring is required and they must alert us or the primary physician if symptoms of infection or other concerning signs are noted.
Olanzapine Pregnancy And Lactation Text: This medication is pregnancy category C.   There are no adequate and well controlled trials with olanzapine in pregnant females.  Olanzapine should be used during pregnancy only if the potential benefit justifies the potential risk to the fetus.   In a study in lactating healthy women, olanzapine was excreted in breast milk.  It is recommended that women taking olanzapine should not breast feed.
Benzoyl Peroxide Counseling: Patient counseled that medicine may cause skin irritation and bleach clothing.  In the event of skin irritation, the patient was advised to reduce the amount of the drug applied or use it less frequently.   The patient verbalized understanding of the proper use and possible adverse effects of benzoyl peroxide.  All of the patient's questions and concerns were addressed.
Protopic Counseling: Patient may experience a mild burning sensation during topical application. Protopic is not approved in children less than 2 years of age. There have been case reports of hematologic and skin malignancies in patients using topical calcineurin inhibitors although causality is questionable.
Topical Retinoid counseling:  Patient advised to apply a pea-sized amount only at bedtime and wait 30 minutes after washing their face before applying.  If too drying, patient may add a non-comedogenic moisturizer. The patient verbalized understanding of the proper use and possible adverse effects of retinoids.  All of the patient's questions and concerns were addressed.
Cibinqo Counseling: I discussed with the patient the risks of Cibinqo therapy including but not limited to common cold, nausea, headache, cold sores, increased blood CPK levels, dizziness, UTIs, fatigue, acne, and vomitting. Live vaccines should be avoided.  This medication has been linked to serious infections; higher rate of mortality; malignancy and lymphoproliferative disorders; major adverse cardiovascular events; thrombosis; thrombocytopenia and lymphopenia; lipid elevations; and retinal detachment.
Klisyri Pregnancy And Lactation Text: It is unknown if this medication can harm a developing fetus or if it is excreted in breast milk.
Finasteride Female Counseling: Finasteride Counseling:  I discussed with the patient the risks of use of finasteride including but not limited to decreased libido and sexual dysfunction. Explained the teratogenic nature of the medication and stressed the importance of not getting pregnant during treatment. All of the patient's questions and concerns were addressed.
Hydroxychloroquine Counseling:  I discussed with the patient that a baseline ophthalmologic exam is needed at the start of therapy and every year thereafter while on therapy. A CBC may also be warranted for monitoring.  The side effects of this medication were discussed with the patient, including but not limited to agranulocytosis, aplastic anemia, seizures, rashes, retinopathy, and liver toxicity. Patient instructed to call the office should any adverse effect occur.  The patient verbalized understanding of the proper use and possible adverse effects of Plaquenil.  All the patient's questions and concerns were addressed.
Sotyktu Pregnancy And Lactation Text: There is insufficient data to evaluate whether or not Sotyktu is safe to use during pregnancy.   It is not known if Sotyktu passes into breast milk and whether or not it is safe to use when breastfeeding.  
Doxycycline Counseling:  Patient counseled regarding possible photosensitivity and increased risk for sunburn.  Patient instructed to avoid sunlight, if possible.  When exposed to sunlight, patients should wear protective clothing, sunglasses, and sunscreen.  The patient was instructed to call the office immediately if the following severe adverse effects occur:  hearing changes, easy bruising/bleeding, severe headache, or vision changes.  The patient verbalized understanding of the proper use and possible adverse effects of doxycycline.  All of the patient's questions and concerns were addressed.
Sski Pregnancy And Lactation Text: This medication is Pregnancy Category D and isn't considered safe during pregnancy. It is excreted in breast milk.
Clofazimine Pregnancy And Lactation Text: This medication is Pregnancy Category C and isn't considered safe during pregnancy. It is excreted in breast milk.
Elidel Counseling: Patient may experience a mild burning sensation during topical application. Elidel is not approved in children less than 2 years of age. There have been case reports of hematologic and skin malignancies in patients using topical calcineurin inhibitors although causality is questionable.
Griseofulvin Counseling:  I discussed with the patient the risks of griseofulvin including but not limited to photosensitivity, cytopenia, liver damage, nausea/vomiting and severe allergy.  The patient understands that this medication is best absorbed when taken with a fatty meal (e.g., ice cream or french fries).
Dupixent Pregnancy And Lactation Text: This medication likely crosses the placenta but the risk for the fetus is uncertain. This medication is excreted in breast milk.
Topical Steroids Counseling: I discussed with the patient that prolonged use of topical steroids can result in the increased appearance of superficial blood vessels (telangiectasias), lightening (hypopigmentation) and thinning of the skin (atrophy).  Patient understands to avoid using high potency steroids in skin folds, the groin or the face.  The patient verbalized understanding of the proper use and possible adverse effects of topical steroids.  All of the patient's questions and concerns were addressed.
Taltz Counseling: I discussed with the patient the risks of ixekizumab including but not limited to immunosuppression, serious infections, worsening of inflammatory bowel disease and drug reactions.  The patient understands that monitoring is required including a PPD at baseline and must alert us or the primary physician if symptoms of infection or other concerning signs are noted.
Libtayo Pregnancy And Lactation Text: This medication is contraindicated in pregnancy and when breast feeding.
Benzoyl Peroxide Pregnancy And Lactation Text: This medication is Pregnancy Category C. It is unknown if benzoyl peroxide is excreted in breast milk.
Protopic Pregnancy And Lactation Text: This medication is Pregnancy Category C. It is unknown if this medication is excreted in breast milk when applied topically.
Oral Minoxidil Counseling- I discussed with the patient the risks of oral minoxidil including but not limited to shortness of breath, swelling of the feet or ankles, dizziness, lightheadedness, unwanted hair growth and allergic reaction.  The patient verbalized understanding of the proper use and possible adverse effects of oral minoxidil.  All of the patient's questions and concerns were addressed.
Isotretinoin Counseling: Patient should get monthly blood tests, not donate blood, not drive at night if vision affected, not share medication, and not undergo elective surgery for 6 months after tx completed. Side effects reviewed, pt to contact office should one occur.
Xeljanz Counseling: I discussed with the patient the risks of Xeljanz therapy including increased risk of infection, liver issues, headache, diarrhea, or cold symptoms. Live vaccines should be avoided. They were instructed to call if they have any problems.
Minoxidil Counseling: Minoxidil is a topical medication which can increase blood flow where it is applied. It is uncertain how this medication increases hair growth. Side effects are uncommon and include stinging and allergic reactions.
Finasteride Pregnancy And Lactation Text: This medication is absolutely contraindicated during pregnancy. It is unknown if it is excreted in breast milk.
Hydroxychloroquine Pregnancy And Lactation Text: This medication has been shown to cause fetal harm but it isn't assigned a Pregnancy Risk Category. There are small amounts excreted in breast milk.
Rituxan Pregnancy And Lactation Text: This medication is Pregnancy Category C and it isn't know if it is safe during pregnancy. It is unknown if this medication is excreted in breast milk but similar antibodies are known to be excreted.
Griseofulvin Pregnancy And Lactation Text: This medication is Pregnancy Category X and is known to cause serious birth defects. It is unknown if this medication is excreted in breast milk but breast feeding should be avoided.
Rifampin Counseling: I discussed with the patient the risks of rifampin including but not limited to liver damage, kidney damage, red-orange body fluids, nausea/vomiting and severe allergy.
Cyclophosphamide Counseling:  I discussed with the patient the risks of cyclophosphamide including but not limited to hair loss, hormonal abnormalities, decreased fertility, abdominal pain, diarrhea, nausea and vomiting, bone marrow suppression and infection. The patient understands that monitoring is required while taking this medication.
Zyclara Counseling:  I discussed with the patient the risks of imiquimod including but not limited to erythema, scaling, itching, weeping, crusting, and pain.  Patient understands that the inflammatory response to imiquimod is variable from person to person and was educated regarded proper titration schedule.  If flu-like symptoms develop, patient knows to discontinue the medication and contact us.
Adbry Counseling: I discussed with the patient the risks of tralokinumab including but not limited to eye infection and irritation, cold sores, injection site reactions, worsening of asthma, allergic reactions and increased risk of parasitic infection.  Live vaccines should be avoided while taking tralokinumab. The patient understands that monitoring is required and they must alert us or the primary physician if symptoms of infection or other concerning signs are noted.
Carac Counseling:  I discussed with the patient the risks of Carac including but not limited to erythema, scaling, itching, weeping, crusting, and pain.
Topical Sulfur Applications Pregnancy And Lactation Text: This medication is considered safe during pregnancy and breast feeding secondary to limited systemic absorption.
Oral Minoxidil Pregnancy And Lactation Text: This medication should only be used when clearly needed if you are pregnant, attempting to become pregnant or breast feeding.
Doxepin Counseling:  Patient advised that the medication is sedating and not to drive a car after taking this medication. Patient informed of potential adverse effects including but not limited to dry mouth, urinary retention, and blurry vision.  The patient verbalized understanding of the proper use and possible adverse effects of doxepin.  All of the patient's questions and concerns were addressed.
Isotretinoin Pregnancy And Lactation Text: This medication is Pregnancy Category X and is considered extremely dangerous during pregnancy. It is unknown if it is excreted in breast milk.
Qbrexza Counseling:  I discussed with the patient the risks of Qbrexza including but not limited to headache, mydriasis, blurred vision, dry eyes, nasal dryness, dry mouth, dry throat, dry skin, urinary hesitation, and constipation.  Local skin reactions including erythema, burning, stinging, and itching can also occur.
Doxycycline Pregnancy And Lactation Text: This medication is Pregnancy Category D and not consider safe during pregnancy. It is also excreted in breast milk but is considered safe for shorter treatment courses.
Thalidomide Counseling: I discussed with the patient the risks of thalidomide including but not limited to birth defects, anxiety, weakness, chest pain, dizziness, cough and severe allergy.
Colchicine Counseling:  Patient counseled regarding adverse effects including but not limited to stomach upset (nausea, vomiting, stomach pain, or diarrhea).  Patient instructed to limit alcohol consumption while taking this medication.  Colchicine may reduce blood counts especially with prolonged use.  The patient understands that monitoring of kidney function and blood counts may be required, especially at baseline. The patient verbalized understanding of the proper use and possible adverse effects of colchicine.  All of the patient's questions and concerns were addressed.
Cibinqo Pregnancy And Lactation Text: It is unknown if this medication will adversely affect pregnancy or breast feeding.  You should not take this medication if you are currently pregnant or planning a pregnancy or while breastfeeding.
Topical Steroids Applications Pregnancy And Lactation Text: Most topical steroids are considered safe to use during pregnancy and lactation.  Any topical steroid applied to the breast or nipple should be washed off before breastfeeding.
Xelrobbyz Pregnancy And Lactation Text: This medication is Pregnancy Category D and is not considered safe during pregnancy.  The risk during breast feeding is also uncertain.
Azithromycin Counseling:  I discussed with the patient the risks of azithromycin including but not limited to GI upset, allergic reaction, drug rash, diarrhea, and yeast infections.
Odomzo Counseling- I discussed with the patient the risks of Odomzo including but not limited to nausea, vomiting, diarrhea, constipation, weight loss, changes in the sense of taste, decreased appetite, muscle spasms, and hair loss.  The patient verbalized understanding of the proper use and possible adverse effects of Odomzo.  All of the patient's questions and concerns were addressed.
Enbrel Counseling:  I discussed with the patient the risks of etanercept including but not limited to myelosuppression, immunosuppression, autoimmune hepatitis, demyelinating diseases, lymphoma, and infections.  The patient understands that monitoring is required including a PPD at baseline and must alert us or the primary physician if symptoms of infection or other concerning signs are noted.
Itraconazole Counseling:  I discussed with the patient the risks of itraconazole including but not limited to liver damage, nausea/vomiting, neuropathy, and severe allergy.  The patient understands that this medication is best absorbed when taken with acidic beverages such as non-diet cola or ginger ale.  The patient understands that monitoring is required including baseline LFTs and repeat LFTs at intervals.  The patient understands that they are to contact us or the primary physician if concerning signs are noted.
Rifampin Pregnancy And Lactation Text: This medication is Pregnancy Category C and it isn't know if it is safe during pregnancy. It is also excreted in breast milk and should not be used if you are breast feeding.
Tazorac Counseling:  Patient advised that medication is irritating and drying.  Patient may need to apply sparingly and wash off after an hour before eventually leaving it on overnight.  The patient verbalized understanding of the proper use and possible adverse effects of tazorac.  All of the patient's questions and concerns were addressed.
Adbry Pregnancy And Lactation Text: It is unknown if this medication will adversely affect pregnancy or breast feeding.
Birth Control Pills Counseling: Birth Control Pill Counseling: I discussed with the patient the potential side effects of OCPs including but not limited to increased risk of stroke, heart attack, thrombophlebitis, deep venous thrombosis, hepatic adenomas, breast changes, GI upset, headaches, and depression.  The patient verbalized understanding of the proper use and possible adverse effects of OCPs. All of the patient's questions and concerns were addressed.
Low Dose Naltrexone Counseling- I discussed with the patient the potential risks and side effects of low dose naltrexone including but not limited to: more vivid dreams, headaches, nausea, vomiting, abdominal pain, fatigue, dizziness, and anxiety.
Minoxidil Pregnancy And Lactation Text: This medication has not been assigned a Pregnancy Risk Category but animal studies failed to show danger with the topical medication. It is unknown if the medication is excreted in breast milk.
Cyclophosphamide Pregnancy And Lactation Text: This medication is Pregnancy Category D and it isn't considered safe during pregnancy. This medication is excreted in breast milk.
Siliq Counseling:  I discussed with the patient the risks of Siliq including but not limited to new or worsening depression, suicidal thoughts and behavior, immunosuppression, malignancy, posterior leukoencephalopathy syndrome, and serious infections.  The patient understands that monitoring is required including a PPD at baseline and must alert us or the primary physician if symptoms of infection or other concerning signs are noted. There is also a special program designed to monitor depression which is required with Siliq.
High Dose Vitamin A Counseling: Side effects reviewed, pt to contact office should one occur.
Qbrexza Pregnancy And Lactation Text: There is no available data on Qbrexza use in pregnant women.  There is no available data on Qbrexza use in lactation.
Doxepin Pregnancy And Lactation Text: This medication is Pregnancy Category C and it isn't known if it is safe during pregnancy. It is also excreted in breast milk and breast feeding isn't recommended.
Wartpeel Counseling:  I discussed with the patient the risks of Wartpeel including but not limited to erythema, scaling, itching, weeping, crusting, and pain.
Eucrisa Counseling: Patient may experience a mild burning sensation during topical application. Eucrisa is not approved in children less than 3 months of age.
Litfulo Counseling: I discussed with the patient the risks of Litfulo therapy including but not limited to upper respiratory tract infections, shingles, cold sores, and nausea. Live vaccines should be avoided.  This medication has been linked to serious infections; higher rate of mortality; malignancy and lymphoproliferative disorders; major adverse cardiovascular events; thrombosis; gastrointestinal perforations; neutropenia; lymphopenia; anemia; liver enzyme elevations; and lipid elevations.
Otezla Counseling: The side effects of Otezla were discussed with the patient, including but not limited to worsening or new depression, weight loss, diarrhea, nausea, upper respiratory tract infection, and headache. Patient instructed to call the office should any adverse effect occur.  The patient verbalized understanding of the proper use and possible adverse effects of Otezla.  All the patient's questions and concerns were addressed.
Erythromycin Counseling:  I discussed with the patient the risks of erythromycin including but not limited to GI upset, allergic reaction, drug rash, diarrhea, increase in liver enzymes, and yeast infections.
Tremfya Counseling: I discussed with the patient the risks of guselkumab including but not limited to immunosuppression, serious infections, and drug reactions.  The patient understands that monitoring is required including a PPD at baseline and must alert us or the primary physician if symptoms of infection or other concerning signs are noted.
Bimzelx Counseling:  I discussed with the patient the risks of Bimzelx including but not limited to depression, immunosuppression, allergic reactions and infections.  The patient understands that monitoring is required including a PPD at baseline and must alert us or the primary physician if symptoms of infection or other concerning signs are noted.
Sarecycline Counseling: Patient advised regarding possible photosensitivity and discoloration of the teeth, skin, lips, tongue and gums.  Patient instructed to avoid sunlight, if possible.  When exposed to sunlight, patients should wear protective clothing, sunglasses, and sunscreen.  The patient was instructed to call the office immediately if the following severe adverse effects occur:  hearing changes, easy bruising/bleeding, severe headache, or vision changes.  The patient verbalized understanding of the proper use and possible adverse effects of sarecycline.  All of the patient's questions and concerns were addressed.
Azithromycin Pregnancy And Lactation Text: This medication is considered safe during pregnancy and is also secreted in breast milk.
Topical Sulfur Applications Counseling: Topical Sulfur Counseling: Patient counseled that this medication may cause skin irritation or allergic reactions.  In the event of skin irritation, the patient was advised to reduce the amount of the drug applied or use it less frequently.   The patient verbalized understanding of the proper use and possible adverse effects of topical sulfur application.  All of the patient's questions and concerns were addressed.
Mirvaso Counseling: Mirvaso is a topical medication which can decrease superficial blood flow where applied. Side effects are uncommon and include stinging, redness and allergic reactions.
Birth Control Pills Pregnancy And Lactation Text: This medication should be avoided if pregnant and for the first 30 days post-partum.
Low Dose Naltrexone Pregnancy And Lactation Text: Naltrexone is pregnancy category C.  There have been no adequate and well-controlled studies in pregnant women.  It should be used in pregnancy only if the potential benefit justifies the potential risk to the fetus.   Limited data indicates that naltrexone is minimally excreted into breastmilk.
Tranexamic Acid Counseling:  Patient advised of the small risk of bleeding problems with tranexamic acid. They were also instructed to call if they developed any nausea, vomiting or diarrhea. All of the patient's questions and concerns were addressed.
Litfulo Pregnancy And Lactation Text: Based on animal studies, Lifulo may cause embryo-fetal harm when administered to pregnant women.  The medication should not be used in pregnancy.  Breastfeeding is not recommended during treatment.
Tazorac Pregnancy And Lactation Text: This medication is not safe during pregnancy. It is unknown if this medication is excreted in breast milk.
Dapsone Counseling: I discussed with the patient the risks of dapsone including but not limited to hemolytic anemia, agranulocytosis, rashes, methemoglobinemia, kidney failure, peripheral neuropathy, headaches, GI upset, and liver toxicity.  Patients who start dapsone require monitoring including baseline LFTs and weekly CBCs for the first month, then every month thereafter.  The patient verbalized understanding of the proper use and possible adverse effects of dapsone.  All of the patient's questions and concerns were addressed.
Cyclosporine Counseling:  I discussed with the patient the risks of cyclosporine including but not limited to hypertension, gingival hyperplasia,myelosuppression, immunosuppression, liver damage, kidney damage, neurotoxicity, lymphoma, and serious infections. The patient understands that monitoring is required including baseline blood pressure, CBC, CMP, lipid panel and uric acid, and then 1-2 times monthly CMP and blood pressure.
Humira Counseling:  I discussed with the patient the risks of adalimumab including but not limited to myelosuppression, immunosuppression, autoimmune hepatitis, demyelinating diseases, lymphoma, and serious infections.  The patient understands that monitoring is required including a PPD at baseline and must alert us or the primary physician if symptoms of infection or other concerning signs are noted.
Hyrimoz Counseling:  I discussed with the patient the risks of adalimumab including but not limited to myelosuppression, immunosuppression, autoimmune hepatitis, demyelinating diseases, lymphoma, and serious infections.  The patient understands that monitoring is required including a PPD at baseline and must alert us or the primary physician if symptoms of infection or other concerning signs are noted.
Erythromycin Pregnancy And Lactation Text: This medication is Pregnancy Category B and is considered safe during pregnancy. It is also excreted in breast milk.
Calcipotriene Counseling:  I discussed with the patient the risks of calcipotriene including but not limited to erythema, scaling, itching, and irritation.
Rhofade Counseling: Rhofade is a topical medication which can decrease superficial blood flow where applied. Side effects are uncommon and include stinging, redness and allergic reactions.
Hydroxyzine Counseling: Patient advised that the medication is sedating and not to drive a car after taking this medication.  Patient informed of potential adverse effects including but not limited to dry mouth, urinary retention, and blurry vision.  The patient verbalized understanding of the proper use and possible adverse effects of hydroxyzine.  All of the patient's questions and concerns were addressed.
High Dose Vitamin A Pregnancy And Lactation Text: High dose vitamin A therapy is contraindicated during pregnancy and breast feeding.
Opioid Counseling: I discussed with the patient the potential side effects of opioids including but not limited to addiction, altered mental status, and depression. I stressed avoiding alcohol, benzodiazepines, muscle relaxants and sleep aids unless specifically okayed by a physician. The patient verbalized understanding of the proper use and possible adverse effects of opioids. All of the patient's questions and concerns were addressed. They were instructed to flush the remaining pills down the toilet if they did not need them for pain.
Niacinamide Counseling: I recommended taking niacin or niacinamide, also know as vitamin B3, twice daily. Recent evidence suggests that taking vitamin B3 (500 mg twice daily) can reduce the risk of actinic keratoses and non-melanoma skin cancers. Side effects of vitamin B3 include flushing and headache.
Spironolactone Counseling: Patient advised regarding risks of diarrhea, abdominal pain, hyperkalemia, birth defects (for female patients), liver toxicity and renal toxicity. The patient may need blood work to monitor liver and kidney function and potassium levels while on therapy. The patient verbalized understanding of the proper use and possible adverse effects of spironolactone.  All of the patient's questions and concerns were addressed.
Mirvaso Pregnancy And Lactation Text: This medication has not been assigned a Pregnancy Risk Category. It is unknown if the medication is excreted in breast milk.
Bactrim Counseling:  I discussed with the patient the risks of sulfa antibiotics including but not limited to GI upset, allergic reaction, drug rash, diarrhea, dizziness, photosensitivity, and yeast infections.  Rarely, more serious reactions can occur including but not limited to aplastic anemia, agranulocytosis, methemoglobinemia, blood dyscrasias, liver or kidney failure, lung infiltrates or desquamative/blistering drug rashes.
Otezla Pregnancy And Lactation Text: This medication is Pregnancy Category C and it isn't known if it is safe during pregnancy. It is unknown if it is excreted in breast milk.
Calcipotriene Pregnancy And Lactation Text: The use of this medication during pregnancy or lactation is not recommended as there is insufficient data.
Topical Clindamycin Counseling: Patient counseled that this medication may cause skin irritation or allergic reactions.  In the event of skin irritation, the patient was advised to reduce the amount of the drug applied or use it less frequently.   The patient verbalized understanding of the proper use and possible adverse effects of clindamycin.  All of the patient's questions and concerns were addressed.
Dutasteride Male Counseling: Dustasteride Counseling:  I discussed with the patient the risks of use of dutasteride including but not limited to decreased libido, decreased ejaculate volume, and gynecomastia. Women who can become pregnant should not handle medication.  All of the patient's questions and concerns were addressed.
Olumiant Counseling: I discussed with the patient the risks of Olumiant therapy including but not limited to upper respiratory tract infections, shingles, cold sores, and nausea. Live vaccines should be avoided.  This medication has been linked to serious infections; higher rate of mortality; malignancy and lymphoproliferative disorders; major adverse cardiovascular events; thrombosis; gastrointestinal perforations; neutropenia; lymphopenia; anemia; liver enzyme elevations; and lipid elevations.
Simponi Counseling:  I discussed with the patient the risks of golimumab including but not limited to myelosuppression, immunosuppression, autoimmune hepatitis, demyelinating diseases, lymphoma, and serious infections.  The patient understands that monitoring is required including a PPD at baseline and must alert us or the primary physician if symptoms of infection or other concerning signs are noted.
Bimzelx Pregnancy And Lactation Text: This medication crosses the placenta and the safety is uncertain during pregnancy. It is unknown if this medication is present in breast milk.
Winlevi Counseling:  I discussed with the patient the risks of topical clascoterone including but not limited to erythema, scaling, itching, and stinging. Patient voiced their understanding.
Hydroxyzine Pregnancy And Lactation Text: This medication is not safe during pregnancy and should not be taken. It is also excreted in breast milk and breast feeding isn't recommended.
Metronidazole Counseling:  I discussed with the patient the risks of metronidazole including but not limited to seizures, nausea/vomiting, a metallic taste in the mouth, nausea/vomiting and severe allergy.
Xolair Counseling:  Patient informed of potential adverse effects including but not limited to fever, muscle aches, rash and allergic reactions.  The patient verbalized understanding of the proper use and possible adverse effects of Xolair.  All of the patient's questions and concerns were addressed.
Dapsone Pregnancy And Lactation Text: This medication is Pregnancy Category C and is not considered safe during pregnancy or breast feeding.
Ketoconazole Counseling:   Patient counseled regarding improving absorption with orange juice.  Adverse effects include but are not limited to breast enlargement, headache, diarrhea, nausea, upset stomach, liver function test abnormalities, taste disturbance, and stomach pain.  There is a rare possibility of liver failure that can occur when taking ketoconazole. The patient understands that monitoring of LFTs may be required, especially at baseline. The patient verbalized understanding of the proper use and possible adverse effects of ketoconazole.  All of the patient's questions and concerns were addressed.
Tranexamic Acid Pregnancy And Lactation Text: It is unknown if this medication is safe during pregnancy or breast feeding.
Hydroquinone Counseling:  Patient advised that medication may result in skin irritation, lightening (hypopigmentation), dryness, and burning.  In the event of skin irritation, the patient was advised to reduce the amount of the drug applied or use it less frequently.  Rarely, spots that are treated with hydroquinone can become darker (pseudoochronosis).  Should this occur, patient instructed to stop medication and call the office. The patient verbalized understanding of the proper use and possible adverse effects of hydroquinone.  All of the patient's questions and concerns were addressed.
Opioid Pregnancy And Lactation Text: These medications can lead to premature delivery and should be avoided during pregnancy. These medications are also present in breast milk in small amounts.
Cantharidin Counseling:  I discussed with the patient the risks of Cantharidin including but not limited to pain, redness, burning, itching, and blistering.
Oxybutynin Counseling:  I discussed with the patient the risks of oxybutynin including but not limited to skin rash, drowsiness, dry mouth, difficulty urinating, and blurred vision.
Tetracycline Counseling: Patient counseled regarding possible photosensitivity and increased risk for sunburn.  Patient instructed to avoid sunlight, if possible.  When exposed to sunlight, patients should wear protective clothing, sunglasses, and sunscreen.  The patient was instructed to call the office immediately if the following severe adverse effects occur:  hearing changes, easy bruising/bleeding, severe headache, or vision changes.  The patient verbalized understanding of the proper use and possible adverse effects of tetracycline.  All of the patient's questions and concerns were addressed. Patient understands to avoid pregnancy while on therapy due to potential birth defects.
Spironolactone Pregnancy And Lactation Text: This medication can cause feminization of the male fetus and should be avoided during pregnancy. The active metabolite is also found in breast milk.
Cimzia Counseling:  I discussed with the patient the risks of Cimzia including but not limited to immunosuppression, allergic reactions and infections.  The patient understands that monitoring is required including a PPD at baseline and must alert us or the primary physician if symptoms of infection or other concerning signs are noted.
Niacinamide Pregnancy And Lactation Text: These medications are considered safe during pregnancy.
Bactrim Pregnancy And Lactation Text: This medication is Pregnancy Category D and is known to cause fetal risk.  It is also excreted in breast milk.
Opzelura Counseling:  I discussed with the patient the risks of Opzelura including but not limited to nasopharngitis, bronchitis, ear infection, eosinophila, hives, diarrhea, folliculitis, tonsillitis, and rhinorrhea.  Taken orally, this medication has been linked to serious infections; higher rate of mortality; malignancy and lymphoproliferative disorders; major adverse cardiovascular events; thrombosis; thrombocytopenia, anemia, and neutropenia; and lipid elevations.
Aklief counseling:  Patient advised to apply a pea-sized amount only at bedtime and wait 30 minutes after washing their face before applying.  If too drying, patient may add a non-comedogenic moisturizer.  The most commonly reported side effects including irritation, redness, scaling, dryness, stinging, burning, itching, and increased risk of sunburn.  The patient verbalized understanding of the proper use and possible adverse effects of retinoids.  All of the patient's questions and concerns were addressed.
Ilumya Counseling: I discussed with the patient the risks of tildrakizumab including but not limited to immunosuppression, malignancy, posterior leukoencephalopathy syndrome, and serious infections.  The patient understands that monitoring is required including a PPD at baseline and must alert us or the primary physician if symptoms of infection or other concerning signs are noted.
Metronidazole Pregnancy And Lactation Text: This medication is Pregnancy Category B and considered safe during pregnancy.  It is also excreted in breast milk.
Winlevi Pregnancy And Lactation Text: This medication is considered safe during pregnancy and breastfeeding.
Ketoconazole Pregnancy And Lactation Text: This medication is Pregnancy Category C and it isn't know if it is safe during pregnancy. It is also excreted in breast milk and breast feeding isn't recommended.
Methotrexate Counseling:  Patient counseled regarding adverse effects of methotrexate including but not limited to nausea, vomiting, abnormalities in liver function tests. Patients may develop mouth sores, rash, diarrhea, and abnormalities in blood counts. The patient understands that monitoring is required including LFT's and blood counts.  There is a rare possibility of scarring of the liver and lung problems that can occur when taking methotrexate. Persistent nausea, loss of appetite, pale stools, dark urine, cough, and shortness of breath should be reported immediately. Patient advised to discontinue methotrexate treatment at least three months before attempting to become pregnant.  I discussed the need for folate supplements while taking methotrexate.  These supplements can decrease side effects during methotrexate treatment. The patient verbalized understanding of the proper use and possible adverse effects of methotrexate.  All of the patient's questions and concerns were addressed.
Gabapentin Counseling: I discussed with the patient the risks of gabapentin including but not limited to dizziness, somnolence, fatigue and ataxia.
Cantharidin Pregnancy And Lactation Text: This medication has not been proven safe during pregnancy. It is unknown if this medication is excreted in breast milk.
Valtrex Counseling: I discussed with the patient the risks of valacyclovir including but not limited to kidney damage, nausea, vomiting and severe allergy.  The patient understands that if the infection seems to be worsening or is not improving, they are to call.
Olumiant Pregnancy And Lactation Text: Based on animal studies, Olumiant may cause embryo-fetal harm when administered to pregnant women.  The medication should not be used in pregnancy.  Breastfeeding is not recommended during treatment.
5-Fu Counseling: 5-Fluorouracil Counseling:  I discussed with the patient the risks of 5-fluorouracil including but not limited to erythema, scaling, itching, weeping, crusting, and pain.
Solaraze Counseling:  I discussed with the patient the risks of Solaraze including but not limited to erythema, scaling, itching, weeping, crusting, and pain.
Xolair Pregnancy And Lactation Text: This medication is Pregnancy Category B and is considered safe during pregnancy. This medication is excreted in breast milk.
Skyrizi Counseling: I discussed with the patient the risks of risankizumab-rzaa including but not limited to immunosuppression, and serious infections.  The patient understands that monitoring is required including a PPD at baseline and must alert us or the primary physician if symptoms of infection or other concerning signs are noted.
Cimzia Pregnancy And Lactation Text: This medication crosses the placenta but can be considered safe in certain situations. Cimzia may be excreted in breast milk.
Opzelura Pregnancy And Lactation Text: There is insufficient data to evaluate drug-associated risk for major birth defects, miscarriage, or other adverse maternal or fetal outcomes.  There is a pregnancy registry that monitors pregnancy outcomes in pregnant persons exposed to the medication during pregnancy.  It is unknown if this medication is excreted in breast milk.  Do not breastfeed during treatment and for about 4 weeks after the last dose.
Cephalexin Counseling: I counseled the patient regarding use of cephalexin as an antibiotic for prophylactic and/or therapeutic purposes. Cephalexin (commonly prescribed under brand name Keflex) is a cephalosporin antibiotic which is active against numerous classes of bacteria, including most skin bacteria. Side effects may include nausea, diarrhea, gastrointestinal upset, rash, hives, yeast infections, and in rare cases, hepatitis, kidney disease, seizures, fever, confusion, neurologic symptoms, and others. Patients with severe allergies to penicillin medications are cautioned that there is about a 10% incidence of cross-reactivity with cephalosporins. When possible, patients with penicillin allergies should use alternatives to cephalosporins for antibiotic therapy.
Albendazole Counseling:  I discussed with the patient the risks of albendazole including but not limited to cytopenia, kidney damage, nausea/vomiting and severe allergy.  The patient understands that this medication is being used in an off-label manner.
Acitretin Counseling:  I discussed with the patient the risks of acitretin including but not limited to hair loss, dry lips/skin/eyes, liver damage, hyperlipidemia, depression/suicidal ideation, photosensitivity.  Serious rare side effects can include but are not limited to pancreatitis, pseudotumor cerebri, bony changes, clot formation/stroke/heart attack.  Patient understands that alcohol is contraindicated since it can result in liver toxicity and significantly prolong the elimination of the drug by many years.
Valtrex Pregnancy And Lactation Text: this medication is Pregnancy Category B and is considered safe during pregnancy. This medication is not directly found in breast milk but it's metabolite acyclovir is present.
Rinvoq Counseling: I discussed with the patient the risks of Rinvoq therapy including but not limited to upper respiratory tract infections, shingles, cold sores, bronchitis, nausea, cough, fever, acne, and headache. Live vaccines should be avoided.  This medication has been linked to serious infections; higher rate of mortality; malignancy and lymphoproliferative disorders; major adverse cardiovascular events; thrombosis; thrombocytopenia, anemia, and neutropenia; lipid elevations; liver enzyme elevations; and gastrointestinal perforations.
Terbinafine Counseling: Patient counseling regarding adverse effects of terbinafine including but not limited to headache, diarrhea, rash, upset stomach, liver function test abnormalities, itching, taste/smell disturbance, nausea, abdominal pain, and flatulence.  There is a rare possibility of liver failure that can occur when taking terbinafine.  The patient understands that a baseline LFT and kidney function test may be required. The patient verbalized understanding of the proper use and possible adverse effects of terbinafine.  All of the patient's questions and concerns were addressed.
Imiquimod Counseling:  I discussed with the patient the risks of imiquimod including but not limited to erythema, scaling, itching, weeping, crusting, and pain.  Patient understands that the inflammatory response to imiquimod is variable from person to person and was educated regarded proper titration schedule.  If flu-like symptoms develop, patient knows to discontinue the medication and contact us.
Topical Ketoconazole Counseling: Patient counseled that this medication may cause skin irritation or allergic reactions.  In the event of skin irritation, the patient was advised to reduce the amount of the drug applied or use it less frequently.   The patient verbalized understanding of the proper use and possible adverse effects of ketoconazole.  All of the patient's questions and concerns were addressed.
Nsaids Counseling: NSAID Counseling: I discussed with the patient that NSAIDs should be taken with food. Prolonged use of NSAIDs can result in the development of stomach ulcers.  Patient advised to stop taking NSAIDs if abdominal pain occurs.  The patient verbalized understanding of the proper use and possible adverse effects of NSAIDs.  All of the patient's questions and concerns were addressed.
Methotrexate Pregnancy And Lactation Text: This medication is Pregnancy Category X and is known to cause fetal harm. This medication is excreted in breast milk.
Aklief Pregnancy And Lactation Text: It is unknown if this medication is safe to use during pregnancy.  It is unknown if this medication is excreted in breast milk.  Breastfeeding women should use the topical cream on the smallest area of the skin for the shortest time needed while breastfeeding.  Do not apply to nipple and areola.
Dutasteride Female Counseling: Dutasteride Counseling:  I discussed with the patient the risks of use of dutasteride including but not limited to decreased libido and sexual dysfunction. Explained the teratogenic nature of the medication and stressed the importance of not getting pregnant during treatment. All of the patient's questions and concerns were addressed.
Azathioprine Counseling:  I discussed with the patient the risks of azathioprine including but not limited to myelosuppression, immunosuppression, hepatotoxicity, lymphoma, and infections.  The patient understands that monitoring is required including baseline LFTs, Creatinine, possible TPMP genotyping and weekly CBCs for the first month and then every 2 weeks thereafter.  The patient verbalized understanding of the proper use and possible adverse effects of azathioprine.  All of the patient's questions and concerns were addressed.
VTAMA Counseling: I discussed with the patient that VTAMA is not for use in the eyes, mouth or mouth. They should call the office if they develop any signs of allergic reactions to VTAMA. The patient verbalized understanding of the proper use and possible adverse effects of VTAMA.  All of the patient's questions and concerns were addressed.
Minocycline Counseling: Patient advised regarding possible photosensitivity and discoloration of the teeth, skin, lips, tongue and gums.  Patient instructed to avoid sunlight, if possible.  When exposed to sunlight, patients should wear protective clothing, sunglasses, and sunscreen.  The patient was instructed to call the office immediately if the following severe adverse effects occur:  hearing changes, easy bruising/bleeding, severe headache, or vision changes.  The patient verbalized understanding of the proper use and possible adverse effects of minocycline.  All of the patient's questions and concerns were addressed.

## 2024-07-17 NOTE — HPI: SWEATING (HYPERHIDROSIS)
How Severe Is It?: mild
Is This A New Presentation, Or A Follow-Up?: Sweating
Additional History: Pt is sweating profusely, on her entire body causing cyst in her vaginal area and breakouts s on her face.
Detail Level: Generalized
Initiate Treatment: advised patient to use sunscreen SPF50 or above and to reapply on regular basis

## 2024-07-17 NOTE — PROCEDURE: INTRAMUSCULAR KENALOG
Kenalog Preparation: kenalog
Administered By (Optional): CHRISTIAN Mendez RN
Concentration (Mg/Ml) Of Additional Medication: 2.5
Ndc# (Optional): [address]-3
Add Option For Additional Mediation: No
Consent: The risks of atrophy were reviewed with the patient.
Lot # (Optional): [id number]
Total Volume (Ccs): 1
Concentration (Mg/Ml): 40.0
Expiration Date (Optional): 04/2025
Detail Level: None

## 2024-08-19 ENCOUNTER — APPOINTMENT (OUTPATIENT)
Dept: URBAN - METROPOLITAN AREA CLINIC 307 | Age: 49
Setting detail: DERMATOLOGY
End: 2024-08-19

## 2024-08-19 DIAGNOSIS — L74.51 PRIMARY FOCAL HYPERHIDROSIS: ICD-10-CM

## 2024-08-19 DIAGNOSIS — L0390 CELLULITIS AND ABSCESS OF UNSPECIFIED SITES: ICD-10-CM

## 2024-08-19 DIAGNOSIS — L0391 CELLULITIS AND ABSCESS OF UNSPECIFIED SITES: ICD-10-CM

## 2024-08-19 PROBLEM — L74.519 PRIMARY FOCAL HYPERHIDROSIS, UNSPECIFIED: Status: ACTIVE | Noted: 2024-08-19

## 2024-08-19 PROBLEM — L02.214 CUTANEOUS ABSCESS OF GROIN: Status: ACTIVE | Noted: 2024-08-19

## 2024-08-19 PROCEDURE — OTHER PRESCRIPTION: OTHER

## 2024-08-19 PROCEDURE — OTHER MIPS QUALITY: OTHER

## 2024-08-19 PROCEDURE — OTHER COUNSELING: OTHER

## 2024-08-19 PROCEDURE — OTHER PRESCRIPTION MEDICATION MANAGEMENT: OTHER

## 2024-08-19 PROCEDURE — 99213 OFFICE O/P EST LOW 20 MIN: CPT

## 2024-08-19 RX ORDER — PHARMACY COMPOUNDING ACCESSORY
EACH MISCELLANEOUS
Qty: 30 | Refills: 11 | Status: ERX | COMMUNITY
Start: 2024-08-19

## 2024-08-19 ASSESSMENT — LOCATION DETAILED DESCRIPTION DERM: LOCATION DETAILED: GENITALIA

## 2024-08-19 ASSESSMENT — LOCATION SIMPLE DESCRIPTION DERM: LOCATION SIMPLE: GENITALIA

## 2024-08-19 ASSESSMENT — SEVERITY ASSESSMENT: SEVERITY: CLEAR

## 2024-08-19 ASSESSMENT — LOCATION ZONE DERM: LOCATION ZONE: TRUNK

## 2024-08-19 NOTE — PROCEDURE: PRESCRIPTION MEDICATION MANAGEMENT
Continue Regimen: glycopyrrolate 2 mg tablet am and pm
Detail Level: Zone
Render In Strict Bullet Format?: No
Continue Regimen: clindamycin 1 % topical gel prn

## 2024-11-25 ENCOUNTER — APPOINTMENT (OUTPATIENT)
Dept: URBAN - METROPOLITAN AREA CLINIC 307 | Age: 49
Setting detail: DERMATOLOGY
End: 2024-11-26

## 2024-11-25 DIAGNOSIS — L53.8 OTHER SPECIFIED ERYTHEMATOUS CONDITIONS: ICD-10-CM

## 2024-11-25 DIAGNOSIS — R20.8 OTHER DISTURBANCES OF SKIN SENSATION: ICD-10-CM

## 2024-11-25 DIAGNOSIS — L74.51 PRIMARY FOCAL HYPERHIDROSIS: ICD-10-CM

## 2024-11-25 DIAGNOSIS — L0391 CELLULITIS AND ABSCESS OF UNSPECIFIED SITES: ICD-10-CM

## 2024-11-25 DIAGNOSIS — L20.89 OTHER ATOPIC DERMATITIS: ICD-10-CM

## 2024-11-25 DIAGNOSIS — Z79.899 OTHER LONG TERM (CURRENT) DRUG THERAPY: ICD-10-CM

## 2024-11-25 DIAGNOSIS — L0390 CELLULITIS AND ABSCESS OF UNSPECIFIED SITES: ICD-10-CM

## 2024-11-25 PROBLEM — L02.212 CUTANEOUS ABSCESS OF BACK [ANY PART, EXCEPT BUTTOCK]: Status: ACTIVE | Noted: 2024-11-25

## 2024-11-25 PROBLEM — L74.519 PRIMARY FOCAL HYPERHIDROSIS, UNSPECIFIED: Status: ACTIVE | Noted: 2024-11-25

## 2024-11-25 PROCEDURE — OTHER PRESCRIPTION MEDICATION MANAGEMENT: OTHER

## 2024-11-25 PROCEDURE — OTHER PRESCRIPTION: OTHER

## 2024-11-25 PROCEDURE — OTHER MEDICATION COUNSELING: OTHER

## 2024-11-25 PROCEDURE — 99214 OFFICE O/P EST MOD 30 MIN: CPT | Mod: 25

## 2024-11-25 PROCEDURE — OTHER MIPS QUALITY: OTHER

## 2024-11-25 PROCEDURE — OTHER COUNSELING: OTHER

## 2024-11-25 PROCEDURE — OTHER INTRAMUSCULAR KENALOG: OTHER

## 2024-11-25 PROCEDURE — OTHER ORDER TESTS: OTHER

## 2024-11-25 PROCEDURE — OTHER RINVOQ MONITORING: OTHER

## 2024-11-25 PROCEDURE — OTHER OTHER: OTHER

## 2024-11-25 PROCEDURE — 96372 THER/PROPH/DIAG INJ SC/IM: CPT

## 2024-11-25 PROCEDURE — OTHER REFERRAL CORRESPONDENCE: OTHER

## 2024-11-25 RX ORDER — GLYCOPYRROLATE 2 MG/1
TABLET ORAL
Qty: 90 | Refills: 11 | Status: ERX

## 2024-11-25 RX ORDER — TRAMADOL HYDROCHLORIDE 50 MG/1
TABLET, FILM COATED ORAL
Qty: 12 | Refills: 0 | Status: ERX

## 2024-11-25 RX ORDER — TRAMADOL HYDROCHLORIDE 50 MG/1
TABLET, FILM COATED ORAL
Qty: 12 | Refills: 0 | Status: CANCELLED | COMMUNITY
Start: 2024-11-25

## 2024-11-25 RX ORDER — UPADACITINIB 15 MG/1
TABLET, EXTENDED RELEASE ORAL
Qty: 30 | Refills: 11 | Status: ERX

## 2024-11-25 RX ORDER — DOXYCYCLINE HYCLATE 100 MG/1
CAPSULE, GELATIN COATED ORAL
Qty: 20 | Refills: 0 | Status: ERX | COMMUNITY
Start: 2024-11-25

## 2024-11-25 ASSESSMENT — LOCATION DETAILED DESCRIPTION DERM
LOCATION DETAILED: SACRAL SPINE
LOCATION DETAILED: RIGHT PROXIMAL PRETIBIAL REGION
LOCATION DETAILED: LEFT ANTERIOR LATERAL DISTAL THIGH
LOCATION DETAILED: LEFT DELTOID
LOCATION DETAILED: RIGHT VENTRAL DISTAL FOREARM
LOCATION DETAILED: LEFT VENTRAL PROXIMAL FOREARM

## 2024-11-25 ASSESSMENT — LOCATION SIMPLE DESCRIPTION DERM
LOCATION SIMPLE: LOWER BACK
LOCATION SIMPLE: LEFT THIGH
LOCATION SIMPLE: LEFT DELTOID
LOCATION SIMPLE: RIGHT FOREARM
LOCATION SIMPLE: LEFT FOREARM
LOCATION SIMPLE: RIGHT PRETIBIAL REGION

## 2024-11-25 ASSESSMENT — LOCATION ZONE DERM
LOCATION ZONE: ARM
LOCATION ZONE: TRUNK
LOCATION ZONE: LEG
LOCATION ZONE: SHOULDER

## 2024-11-25 ASSESSMENT — SEVERITY ASSESSMENT: SEVERITY: MILD

## 2024-11-25 ASSESSMENT — PAIN INTENSITY VAS: HOW INTENSE IS YOUR PAIN 0 BEING NO PAIN, 10 BEING THE MOST SEVERE PAIN POSSIBLE?: 7/10 PAIN

## 2024-11-25 ASSESSMENT — BSA RASH: BSA RASH: 1

## 2024-11-25 NOTE — HPI: SKIN LESION
What Type Of Note Output Would You Prefer (Optional)?: Bullet Format
How Severe Is Your Skin Lesion?: mild
Has Your Skin Lesion Been Treated?: not been treated
Is This A New Presentation, Or A Follow-Up?: Skin Lesion
Additional History: Pt states I have this new spot that came up a couple days ago that is super painful to the touch or when I lean against it so I wanted to see if there was something that I could put on it.

## 2024-11-25 NOTE — PROCEDURE: OTHER
Detail Level: Zone
Note Text (......Xxx Chief Complaint.): This diagnosis correlates with the
Render Risk Assessment In Note?: yes
Other (Free Text): Pt given printed lab orders.

## 2024-11-25 NOTE — PROCEDURE: RINVOQ MONITORING
Current Dosage: 15mg Daily
Detail Level: Zone
Length Of Therapy (Optional): 2 years
Add High Risk Medication Management Associated Diagnosis?: No

## 2024-11-25 NOTE — PROCEDURE: MEDICATION COUNSELING
Saxenda Pregnancy And Lactation Text: The fetal risk of this medication is unknown and taking while pregnant is not recommended. It is unknown if this medication is present in breast milk.
Picato Pregnancy And Lactation Text: This medication is Pregnancy Category C. It is unknown if this medication is excreted in breast milk.
Cellcept Counseling:  I discussed with the patient the risks of mycophenolate mofetil including but not limited to infection/immunosuppression, GI upset, hypokalemia, hypercholesterolemia, bone marrow suppression, lymphoproliferative disorders, malignancy, GI ulceration/bleed/perforation, colitis, interstitial lung disease, kidney failure, progressive multifocal leukoencephalopathy, and birth defects.  The patient understands that monitoring is required including a baseline creatinine and regular CBC testing. In addition, patient must alert us immediately if symptoms of infection or other concerning signs are noted.
Methotrexate Pregnancy And Lactation Text: This medication is Pregnancy Category X and is known to cause fetal harm. This medication is excreted in breast milk.
Azelaic Acid Counseling: Patient counseled that medicine may cause skin irritation and to avoid applying near the eyes.  In the event of skin irritation, the patient was advised to reduce the amount of the drug applied or use it less frequently.   The patient verbalized understanding of the proper use and possible adverse effects of azelaic acid.  All of the patient's questions and concerns were addressed.
Minocycline Pregnancy And Lactation Text: This medication is Pregnancy Category D and not consider safe during pregnancy. It is also excreted in breast milk.
Stelara Counseling:  I discussed with the patient the risks of ustekinumab including but not limited to immunosuppression, malignancy, posterior leukoencephalopathy syndrome, and serious infections.  The patient understands that monitoring is required including a PPD at baseline and must alert us or the primary physician if symptoms of infection or other concerning signs are noted.
Albendazole Pregnancy And Lactation Text: This medication is Pregnancy Category C and it isn't known if it is safe during pregnancy. It is also excreted in breast milk.
Erivedge Pregnancy And Lactation Text: This medication is Pregnancy Category X and is absolutely contraindicated during pregnancy. It is unknown if it is excreted in breast milk.
Rinvoq Counseling: I discussed with the patient the risks of Rinvoq therapy including but not limited to upper respiratory tract infections, shingles, cold sores, bronchitis, nausea, cough, fever, acne, and headache. Live vaccines should be avoided.  This medication has been linked to serious infections; higher rate of mortality; malignancy and lymphoproliferative disorders; major adverse cardiovascular events; thrombosis; thrombocytopenia, anemia, and neutropenia; lipid elevations; liver enzyme elevations; and gastrointestinal perforations.
Topical Metronidazole Counseling: Metronidazole is a topical antibiotic medication. You may experience burning, stinging, redness, or allergic reactions.  Please call our office if you develop any problems from using this medication.
Nsaids Counseling: NSAID Counseling: I discussed with the patient that NSAIDs should be taken with food. Prolonged use of NSAIDs can result in the development of stomach ulcers.  Patient advised to stop taking NSAIDs if abdominal pain occurs.  The patient verbalized understanding of the proper use and possible adverse effects of NSAIDs.  All of the patient's questions and concerns were addressed.
Fluconazole Counseling:  Patient counseled regarding adverse effects of fluconazole including but not limited to headache, diarrhea, nausea, upset stomach, liver function test abnormalities, taste disturbance, and stomach pain.  There is a rare possibility of liver failure that can occur when taking fluconazole.  The patient understands that monitoring of LFTs and kidney function test may be required, especially at baseline. The patient verbalized understanding of the proper use and possible adverse effects of fluconazole.  All of the patient's questions and concerns were addressed.
Imiquimod Counseling:  I discussed with the patient the risks of imiquimod including but not limited to erythema, scaling, itching, weeping, crusting, and pain.  Patient understands that the inflammatory response to imiquimod is variable from person to person and was educated regarded proper titration schedule.  If flu-like symptoms develop, patient knows to discontinue the medication and contact us.
Otezla Pregnancy And Lactation Text: This medication is Pregnancy Category C and it isn't known if it is safe during pregnancy. It is unknown if it is excreted in breast milk.
Bexarotene Pregnancy And Lactation Text: This medication is Pregnancy Category X and should not be given to women who are pregnant or may become pregnant. This medication should not be used if you are breast feeding.
Ilumya Counseling: I discussed with the patient the risks of tildrakizumab including but not limited to immunosuppression, malignancy, posterior leukoencephalopathy syndrome, and serious infections.  The patient understands that monitoring is required including a PPD at baseline and must alert us or the primary physician if symptoms of infection or other concerning signs are noted.
Finasteride Female Counseling: Finasteride Counseling:  I discussed with the patient the risks of use of finasteride including but not limited to decreased libido and sexual dysfunction. Explained the teratogenic nature of the medication and stressed the importance of not getting pregnant during treatment. All of the patient's questions and concerns were addressed.
Semaglutide Counseling: I reviewed the possible side effects including: thyroid tumors, kidney disease, gallbladder disease, abdominal pain, constipation, diarrhea, nausea, vomiting and pancreatitis. Do not take this medication if you have a history or family history of multiple endocrine neoplasia syndrome type 2. Side effects reviewed, pt to contact office should one occur.
Cellcept Pregnancy And Lactation Text: This medication is Pregnancy Category D and isn't considered safe during pregnancy. It is unknown if this medication is excreted in breast milk.
Arava Counseling:  Patient counseled regarding adverse effects of Arava including but not limited to nausea, vomiting, abnormalities in liver function tests. Patients may develop mouth sores, rash, diarrhea, and abnormalities in blood counts. The patient understands that monitoring is required including LFTs and blood counts.  There is a rare possibility of scarring of the liver and lung problems that can occur when taking methotrexate. Persistent nausea, loss of appetite, pale stools, dark urine, cough, and shortness of breath should be reported immediately. Patient advised to discontinue Arava treatment and consult with a physician prior to attempting conception. The patient will have to undergo a treatment to eliminate Arava from the body prior to conception.
Ivermectin Counseling:  Patient instructed to take medication on an empty stomach with a full glass of water.  Patient informed of potential adverse effects including but not limited to nausea, diarrhea, dizziness, itching, and swelling of the extremities or lymph nodes.  The patient verbalized understanding of the proper use and possible adverse effects of ivermectin.  All of the patient's questions and concerns were addressed.
Protopic Counseling: Patient may experience a mild burning sensation during topical application. Protopic is not approved in children less than 2 years of age. There have been case reports of hematologic and skin malignancies in patients using topical calcineurin inhibitors although causality is questionable.
Prednisone Counseling:  I discussed with the patient the risks of prolonged use of prednisone including but not limited to weight gain, insomnia, osteoporosis, mood changes, diabetes, susceptibility to infection, glaucoma and high blood pressure.  In cases where prednisone use is prolonged, patients should be monitored with blood pressure checks, serum glucose levels and an eye exam.  Additionally, the patient may need to be placed on GI prophylaxis, PCP prophylaxis, and calcium and vitamin D supplementation and/or a bisphosphonate.  The patient verbalized understanding of the proper use and the possible adverse effects of prednisone.  All of the patient's questions and concerns were addressed.
Azelaic Acid Pregnancy And Lactation Text: This medication is considered safe during pregnancy and breast feeding.
Stelara Pregnancy And Lactation Text: This medication is Pregnancy Category B and is considered safe during pregnancy. It is unknown if this medication is excreted in breast milk.
Quinolones Counseling:  I discussed with the patient the risks of fluoroquinolones including but not limited to GI upset, allergic reaction, drug rash, diarrhea, dizziness, photosensitivity, yeast infections, liver function test abnormalities, tendonitis/tendon rupture.
Rinvoq Pregnancy And Lactation Text: Based on animal studies, Rinvoq may cause embryo-fetal harm when administered to pregnant women.  The medication should not be used in pregnancy.  Breastfeeding is not recommended during treatment and for 6 days after the last dose.
Nsaids Pregnancy And Lactation Text: These medications are considered safe up to 30 weeks gestation. It is excreted in breast milk.
Topical Metronidazole Pregnancy And Lactation Text: This medication is Pregnancy Category B and considered safe during pregnancy.  It is also considered safe to use while breastfeeding.
Fluconazole Pregnancy And Lactation Text: This medication is Pregnancy Category C and it isn't know if it is safe during pregnancy. It is also excreted in breast milk.
Libtayo Counseling- I discussed with the patient the risks of Libtayo including but not limited to nausea, vomiting, diarrhea, and bone or muscle pain.  The patient verbalized understanding of the proper use and possible adverse effects of Libtayo.  All of the patient's questions and concerns were addressed.
Finasteride Pregnancy And Lactation Text: This medication is absolutely contraindicated during pregnancy. It is unknown if it is excreted in breast milk.
Oxybutynin Counseling:  I discussed with the patient the risks of oxybutynin including but not limited to skin rash, drowsiness, dry mouth, difficulty urinating, and blurred vision.
Isotretinoin Counseling: Patient should get monthly blood tests, not donate blood, not drive at night if vision affected, not share medication, and not undergo elective surgery for 6 months after tx completed. Side effects reviewed, pt to contact office should one occur.
Cimetidine Counseling:  I discussed with the patient the risks of Cimetidine including but not limited to gynecomastia, headache, diarrhea, nausea, drowsiness, arrhythmias, pancreatitis, skin rashes, psychosis, bone marrow suppression and kidney toxicity.
Ilumya Pregnancy And Lactation Text: The risk during pregnancy and breastfeeding is uncertain with this medication.
Cyclophosphamide Counseling:  I discussed with the patient the risks of cyclophosphamide including but not limited to hair loss, hormonal abnormalities, decreased fertility, abdominal pain, diarrhea, nausea and vomiting, bone marrow suppression and infection. The patient understands that monitoring is required while taking this medication.
Benzoyl Peroxide Counseling: Patient counseled that medicine may cause skin irritation and bleach clothing.  In the event of skin irritation, the patient was advised to reduce the amount of the drug applied or use it less frequently.   The patient verbalized understanding of the proper use and possible adverse effects of benzoyl peroxide.  All of the patient's questions and concerns were addressed.
Prednisone Pregnancy And Lactation Text: This medication is Pregnancy Category C and it isn't know if it is safe during pregnancy. This medication is excreted in breast milk.
Protopic Pregnancy And Lactation Text: This medication is Pregnancy Category C. It is unknown if this medication is excreted in breast milk when applied topically.
Taltz Counseling: I discussed with the patient the risks of ixekizumab including but not limited to immunosuppression, serious infections, worsening of inflammatory bowel disease and drug reactions.  The patient understands that monitoring is required including a PPD at baseline and must alert us or the primary physician if symptoms of infection or other concerning signs are noted.
Sotyktu Counseling:  I discussed the most common side effects of Sotyktu including: common cold, sore throat, sinus infections, cold sores, canker sores, folliculitis, and acne.  I also discussed more serious side effects of Sotyktu including but not limited to: serious allergic reactions; increased risk for infections such as TB; cancers such as lymphomas; rhabdomyolysis and elevated CPK; and elevated triglycerides and liver enzymes. 
Libtayo Pregnancy And Lactation Text: This medication is contraindicated in pregnancy and when breast feeding.
Klisyri Counseling:  I discussed with the patient the risks of Klisyri including but not limited to erythema, scaling, itching, weeping, crusting, and pain.
Isotretinoin Pregnancy And Lactation Text: This medication is Pregnancy Category X and is considered extremely dangerous during pregnancy. It is unknown if it is excreted in breast milk.
Topical Steroids Counseling: I discussed with the patient that prolonged use of topical steroids can result in the increased appearance of superficial blood vessels (telangiectasias), lightening (hypopigmentation) and thinning of the skin (atrophy).  Patient understands to avoid using high potency steroids in skin folds, the groin or the face.  The patient verbalized understanding of the proper use and possible adverse effects of topical steroids.  All of the patient's questions and concerns were addressed.
Griseofulvin Counseling:  I discussed with the patient the risks of griseofulvin including but not limited to photosensitivity, cytopenia, liver damage, nausea/vomiting and severe allergy.  The patient understands that this medication is best absorbed when taken with a fatty meal (e.g., ice cream or french fries).
Olanzapine Counseling- I discussed with the patient the common side effects of olanzapine including but are not limited to: lack of energy, dry mouth, increased appetite, sleepiness, tremor, constipation, dizziness, changes in behavior, or restlessness.  Explained that teenagers are more likely to experience headaches, abdominal pain, pain in the arms or legs, tiredness, and sleepiness.  Serious side effects include but are not limited: increased risk of death in elderly patients who are confused, have memory loss, or dementia-related psychosis; hyperglycemia; increased cholesterol and triglycerides; and weight gain.
Oxybutynin Pregnancy And Lactation Text: This medication is Pregnancy Category B and is considered safe during pregnancy. It is unknown if it is excreted in breast milk.
Birth Control Pills Counseling: Birth Control Pill Counseling: I discussed with the patient the potential side effects of OCPs including but not limited to increased risk of stroke, heart attack, thrombophlebitis, deep venous thrombosis, hepatic adenomas, breast changes, GI upset, headaches, and depression.  The patient verbalized understanding of the proper use and possible adverse effects of OCPs. All of the patient's questions and concerns were addressed.
Infliximab Counseling:  I discussed with the patient the risks of infliximab including but not limited to myelosuppression, immunosuppression, autoimmune hepatitis, demyelinating diseases, lymphoma, and serious infections.  The patient understands that monitoring is required including a PPD at baseline and must alert us or the primary physician if symptoms of infection or other concerning signs are noted.
Wegovy Counseling: I reviewed the possible side effects including: thyroid tumors, kidney disease, gallbladder disease, abdominal pain, constipation, diarrhea, nausea, vomiting and pancreatitis. Do not take this medication if you have a history or family history of multiple endocrine neoplasia syndrome type 2. Side effects reviewed, pt to contact office should one occur.
Cimetidine Pregnancy And Lactation Text: This medication is Pregnancy Category B and is considered safe during pregnancy. It is also excreted in breast milk and breast feeding isn't recommended.
Cyclophosphamide Pregnancy And Lactation Text: This medication is Pregnancy Category D and it isn't considered safe during pregnancy. This medication is excreted in breast milk.
Qbrexza Counseling:  I discussed with the patient the risks of Qbrexza including but not limited to headache, mydriasis, blurred vision, dry eyes, nasal dryness, dry mouth, dry throat, dry skin, urinary hesitation, and constipation.  Local skin reactions including erythema, burning, stinging, and itching can also occur.
Benzoyl Peroxide Pregnancy And Lactation Text: This medication is Pregnancy Category C. It is unknown if benzoyl peroxide is excreted in breast milk.
Clofazimine Counseling:  I discussed with the patient the risks of clofazimine including but not limited to skin and eye pigmentation, liver damage, nausea/vomiting, gastrointestinal bleeding and allergy.
Sotyktu Pregnancy And Lactation Text: There is insufficient data to evaluate whether or not Sotyktu is safe to use during pregnancy.   It is not known if Sotyktu passes into breast milk and whether or not it is safe to use when breastfeeding.  
Rifampin Counseling: I discussed with the patient the risks of rifampin including but not limited to liver damage, kidney damage, red-orange body fluids, nausea/vomiting and severe allergy.
Odomzo Counseling- I discussed with the patient the risks of Odomzo including but not limited to nausea, vomiting, diarrhea, constipation, weight loss, changes in the sense of taste, decreased appetite, muscle spasms, and hair loss.  The patient verbalized understanding of the proper use and possible adverse effects of Odomzo.  All of the patient's questions and concerns were addressed.
Klisyri Pregnancy And Lactation Text: It is unknown if this medication can harm a developing fetus or if it is excreted in breast milk.
Azithromycin Counseling:  I discussed with the patient the risks of azithromycin including but not limited to GI upset, allergic reaction, drug rash, diarrhea, and yeast infections.
Topical Steroids Applications Pregnancy And Lactation Text: Most topical steroids are considered safe to use during pregnancy and lactation.  Any topical steroid applied to the breast or nipple should be washed off before breastfeeding.
Olanzapine Pregnancy And Lactation Text: This medication is pregnancy category C.   There are no adequate and well controlled trials with olanzapine in pregnant females.  Olanzapine should be used during pregnancy only if the potential benefit justifies the potential risk to the fetus.   In a study in lactating healthy women, olanzapine was excreted in breast milk.  It is recommended that women taking olanzapine should not breast feed.
High Dose Vitamin A Counseling: Side effects reviewed, pt to contact office should one occur.
Propranolol Counseling:  I discussed with the patient the risks of propranolol including but not limited to low heart rate, low blood pressure, low blood sugar, restlessness and increased cold sensitivity. They should call the office if they experience any of these side effects.
Birth Control Pills Pregnancy And Lactation Text: This medication should be avoided if pregnant and for the first 30 days post-partum.
Griseofulvin Pregnancy And Lactation Text: This medication is Pregnancy Category X and is known to cause serious birth defects. It is unknown if this medication is excreted in breast milk but breast feeding should be avoided.
Cyclosporine Counseling:  I discussed with the patient the risks of cyclosporine including but not limited to hypertension, gingival hyperplasia,myelosuppression, immunosuppression, liver damage, kidney damage, neurotoxicity, lymphoma, and serious infections. The patient understands that monitoring is required including baseline blood pressure, CBC, CMP, lipid panel and uric acid, and then 1-2 times monthly CMP and blood pressure.
Qbrexza Pregnancy And Lactation Text: There is no available data on Qbrexza use in pregnant women.  There is no available data on Qbrexza use in lactation.
Clofazimine Pregnancy And Lactation Text: This medication is Pregnancy Category C and isn't considered safe during pregnancy. It is excreted in breast milk.
Doxepin Counseling:  Patient advised that the medication is sedating and not to drive a car after taking this medication. Patient informed of potential adverse effects including but not limited to dry mouth, urinary retention, and blurry vision.  The patient verbalized understanding of the proper use and possible adverse effects of doxepin.  All of the patient's questions and concerns were addressed.
Carac Counseling:  I discussed with the patient the risks of Carac including but not limited to erythema, scaling, itching, weeping, crusting, and pain.
Tremfya Counseling: I discussed with the patient the risks of guselkumab including but not limited to immunosuppression, serious infections, and drug reactions.  The patient understands that monitoring is required including a PPD at baseline and must alert us or the primary physician if symptoms of infection or other concerning signs are noted.
Rifampin Pregnancy And Lactation Text: This medication is Pregnancy Category C and it isn't know if it is safe during pregnancy. It is also excreted in breast milk and should not be used if you are breast feeding.
Xeljanz Counseling: I discussed with the patient the risks of Xeljanz therapy including increased risk of infection, liver issues, headache, diarrhea, or cold symptoms. Live vaccines should be avoided. They were instructed to call if they have any problems.
Zoryve Pregnancy And Lactation Text: It is unknown if this medication can cause problems during pregnancy and breastfeeding.
Erythromycin Counseling:  I discussed with the patient the risks of erythromycin including but not limited to GI upset, allergic reaction, drug rash, diarrhea, increase in liver enzymes, and yeast infections.
Adbry Pregnancy And Lactation Text: It is unknown if this medication will adversely affect pregnancy or breast feeding.
Cibinqo Pregnancy And Lactation Text: It is unknown if this medication will adversely affect pregnancy or breast feeding.  You should not take this medication if you are currently pregnant or planning a pregnancy or while breastfeeding.
Tazorac Pregnancy And Lactation Text: This medication is not safe during pregnancy. It is unknown if this medication is excreted in breast milk.
Use Enhanced Medication Counseling?: No
Hydroxychloroquine Pregnancy And Lactation Text: This medication has been shown to cause fetal harm but it isn't assigned a Pregnancy Risk Category. There are small amounts excreted in breast milk.
Zyclara Counseling:  I discussed with the patient the risks of imiquimod including but not limited to erythema, scaling, itching, weeping, crusting, and pain.  Patient understands that the inflammatory response to imiquimod is variable from person to person and was educated regarded proper titration schedule.  If flu-like symptoms develop, patient knows to discontinue the medication and contact us.
Erythromycin Pregnancy And Lactation Text: This medication is Pregnancy Category B and is considered safe during pregnancy. It is also excreted in breast milk.
Skyrizi Counseling: I discussed with the patient the risks of risankizumab-rzaa including but not limited to immunosuppression, and serious infections.  The patient understands that monitoring is required including a PPD at baseline and must alert us or the primary physician if symptoms of infection or other concerning signs are noted.
Litfulo Counseling: I discussed with the patient the risks of Litfulo therapy including but not limited to upper respiratory tract infections, shingles, cold sores, and nausea. Live vaccines should be avoided.  This medication has been linked to serious infections; higher rate of mortality; malignancy and lymphoproliferative disorders; major adverse cardiovascular events; thrombosis; gastrointestinal perforations; neutropenia; lymphopenia; anemia; liver enzyme elevations; and lipid elevations.
Topical Clindamycin Counseling: Patient counseled that this medication may cause skin irritation or allergic reactions.  In the event of skin irritation, the patient was advised to reduce the amount of the drug applied or use it less frequently.   The patient verbalized understanding of the proper use and possible adverse effects of clindamycin.  All of the patient's questions and concerns were addressed.
Low Dose Naltrexone Counseling- I discussed with the patient the potential risks and side effects of low dose naltrexone including but not limited to: more vivid dreams, headaches, nausea, vomiting, abdominal pain, fatigue, dizziness, and anxiety.
Bimzelx Counseling:  I discussed with the patient the risks of Bimzelx including but not limited to depression, immunosuppression, allergic reactions and infections.  The patient understands that monitoring is required including a PPD at baseline and must alert us or the primary physician if symptoms of infection or other concerning signs are noted.
Eucrisa Counseling: Patient may experience a mild burning sensation during topical application. Eucrisa is not approved in children less than 3 months of age.
Dutasteride Male Counseling: Dustasteride Counseling:  I discussed with the patient the risks of use of dutasteride including but not limited to decreased libido, decreased ejaculate volume, and gynecomastia. Women who can become pregnant should not handle medication.  All of the patient's questions and concerns were addressed.
Humira Counseling:  I discussed with the patient the risks of adalimumab including but not limited to myelosuppression, immunosuppression, autoimmune hepatitis, demyelinating diseases, lymphoma, and serious infections.  The patient understands that monitoring is required including a PPD at baseline and must alert us or the primary physician if symptoms of infection or other concerning signs are noted.
Ozempic Counseling: I reviewed the possible side effects including: thyroid tumors, kidney disease, gallbladder disease, abdominal pain, constipation, diarrhea, nausea, vomiting and pancreatitis. Do not take this medication if you have a history or family history of multiple endocrine neoplasia syndrome type 2. Side effects reviewed, pt to contact office should one occur.
Opzelura Counseling:  I discussed with the patient the risks of Opzelura including but not limited to nasopharngitis, bronchitis, ear infection, eosinophila, hives, diarrhea, folliculitis, tonsillitis, and rhinorrhea.  Taken orally, this medication has been linked to serious infections; higher rate of mortality; malignancy and lymphoproliferative disorders; major adverse cardiovascular events; thrombosis; thrombocytopenia, anemia, and neutropenia; and lipid elevations.
Metronidazole Counseling:  I discussed with the patient the risks of metronidazole including but not limited to seizures, nausea/vomiting, a metallic taste in the mouth, nausea/vomiting and severe allergy.
Bimzelx Pregnancy And Lactation Text: This medication crosses the placenta and the safety is uncertain during pregnancy. It is unknown if this medication is present in breast milk.
Litfulo Pregnancy And Lactation Text: Based on animal studies, Lifulo may cause embryo-fetal harm when administered to pregnant women.  The medication should not be used in pregnancy.  Breastfeeding is not recommended during treatment.
Low Dose Naltrexone Pregnancy And Lactation Text: Naltrexone is pregnancy category C.  There have been no adequate and well-controlled studies in pregnant women.  It should be used in pregnancy only if the potential benefit justifies the potential risk to the fetus.   Limited data indicates that naltrexone is minimally excreted into breastmilk.
Eucrisa Pregnancy And Lactation Text: This medication has not been assigned a Pregnancy Risk Category but animal studies failed to show danger with the topical medication. It is unknown if the medication is excreted in breast milk.
Acitretin Counseling:  I discussed with the patient the risks of acitretin including but not limited to hair loss, dry lips/skin/eyes, liver damage, hyperlipidemia, depression/suicidal ideation, photosensitivity.  Serious rare side effects can include but are not limited to pancreatitis, pseudotumor cerebri, bony changes, clot formation/stroke/heart attack.  Patient understands that alcohol is contraindicated since it can result in liver toxicity and significantly prolong the elimination of the drug by many years.
Dutasteride Female Counseling: Dutasteride Counseling:  I discussed with the patient the risks of use of dutasteride including but not limited to decreased libido and sexual dysfunction. Explained the teratogenic nature of the medication and stressed the importance of not getting pregnant during treatment. All of the patient's questions and concerns were addressed.
Aklief counseling:  Patient advised to apply a pea-sized amount only at bedtime and wait 30 minutes after washing their face before applying.  If too drying, patient may add a non-comedogenic moisturizer.  The most commonly reported side effects including irritation, redness, scaling, dryness, stinging, burning, itching, and increased risk of sunburn.  The patient verbalized understanding of the proper use and possible adverse effects of retinoids.  All of the patient's questions and concerns were addressed.
Azathioprine Counseling:  I discussed with the patient the risks of azathioprine including but not limited to myelosuppression, immunosuppression, hepatotoxicity, lymphoma, and infections.  The patient understands that monitoring is required including baseline LFTs, Creatinine, possible TPMP genotyping and weekly CBCs for the first month and then every 2 weeks thereafter.  The patient verbalized understanding of the proper use and possible adverse effects of azathioprine.  All of the patient's questions and concerns were addressed.
Opzelura Pregnancy And Lactation Text: There is insufficient data to evaluate drug-associated risk for major birth defects, miscarriage, or other adverse maternal or fetal outcomes.  There is a pregnancy registry that monitors pregnancy outcomes in pregnant persons exposed to the medication during pregnancy.  It is unknown if this medication is excreted in breast milk.  Do not breastfeed during treatment and for about 4 weeks after the last dose.
Spevigo Counseling: I discussed with the patient the risks of Spevigo including but not limited to fatigue, nasuea, vomiting, headache, pruritus, urinary tract infection, an infusion related reactions.  The patient understands that monitoring is required including screening for tuberculosis at baseline and yearly screening thereafter while continuing Spevigo therapy. They should contact us if symptoms of infection or other concerning signs are noted.
Olumiant Counseling: I discussed with the patient the risks of Olumiant therapy including but not limited to upper respiratory tract infections, shingles, cold sores, and nausea. Live vaccines should be avoided.  This medication has been linked to serious infections; higher rate of mortality; malignancy and lymphoproliferative disorders; major adverse cardiovascular events; thrombosis; gastrointestinal perforations; neutropenia; lymphopenia; anemia; liver enzyme elevations; and lipid elevations.
Metronidazole Pregnancy And Lactation Text: This medication is Pregnancy Category B and considered safe during pregnancy.  It is also excreted in breast milk.
Cimzia Counseling:  I discussed with the patient the risks of Cimzia including but not limited to immunosuppression, allergic reactions and infections.  The patient understands that monitoring is required including a PPD at baseline and must alert us or the primary physician if symptoms of infection or other concerning signs are noted.
Acitretin Pregnancy And Lactation Text: This medication is Pregnancy Category X and should not be given to women who are pregnant or may become pregnant in the future. This medication is excreted in breast milk.
Hydroquinone Counseling:  Patient advised that medication may result in skin irritation, lightening (hypopigmentation), dryness, and burning.  In the event of skin irritation, the patient was advised to reduce the amount of the drug applied or use it less frequently.  Rarely, spots that are treated with hydroquinone can become darker (pseudoochronosis).  Should this occur, patient instructed to stop medication and call the office. The patient verbalized understanding of the proper use and possible adverse effects of hydroquinone.  All of the patient's questions and concerns were addressed.
Topical Ketoconazole Counseling: Patient counseled that this medication may cause skin irritation or allergic reactions.  In the event of skin irritation, the patient was advised to reduce the amount of the drug applied or use it less frequently.   The patient verbalized understanding of the proper use and possible adverse effects of ketoconazole.  All of the patient's questions and concerns were addressed.
Niacinamide Counseling: I recommended taking niacin or niacinamide, also know as vitamin B3, twice daily. Recent evidence suggests that taking vitamin B3 (500 mg twice daily) can reduce the risk of actinic keratoses and non-melanoma skin cancers. Side effects of vitamin B3 include flushing and headache.
Oral Minoxidil Pregnancy And Lactation Text: This medication should only be used when clearly needed if you are pregnant, attempting to become pregnant or breast feeding.
Dutasteride Pregnancy And Lactation Text: This medication is absolutely contraindicated in women, especially during pregnancy and breast feeding. Feminization of male fetuses is possible if taking while pregnant.
Hyrimoz Counseling:  I discussed with the patient the risks of adalimumab including but not limited to myelosuppression, immunosuppression, autoimmune hepatitis, demyelinating diseases, lymphoma, and serious infections.  The patient understands that monitoring is required including a PPD at baseline and must alert us or the primary physician if symptoms of infection or other concerning signs are noted.
Saxenda Counseling: I reviewed the possible side effects including: thyroid tumors, kidney disease, gallbladder disease, abdominal pain, constipation, diarrhea, nausea, vomiting and pancreatitis. Do not take this medication if you have a history or family history of multiple endocrine neoplasia syndrome type 2. Side effects reviewed, pt to contact office should one occur.
Picato Counseling:  I discussed with the patient the risks of Picato including but not limited to erythema, scaling, itching, weeping, crusting, and pain.
Aklief Pregnancy And Lactation Text: It is unknown if this medication is safe to use during pregnancy.  It is unknown if this medication is excreted in breast milk.  Breastfeeding women should use the topical cream on the smallest area of the skin for the shortest time needed while breastfeeding.  Do not apply to nipple and areola.
Albendazole Counseling:  I discussed with the patient the risks of albendazole including but not limited to cytopenia, kidney damage, nausea/vomiting and severe allergy.  The patient understands that this medication is being used in an off-label manner.
Cantharidin Pregnancy And Lactation Text: This medication has not been proven safe during pregnancy. It is unknown if this medication is excreted in breast milk.
Spevigo Pregnancy And Lactation Text: The risk during pregnancy and breastfeeding is uncertain with this medication. This medication does cross the placenta. It is unknown if this medication is found in breast milk.
Olumiant Pregnancy And Lactation Text: Based on animal studies, Olumiant may cause embryo-fetal harm when administered to pregnant women.  The medication should not be used in pregnancy.  Breastfeeding is not recommended during treatment.
Minocycline Counseling: Patient advised regarding possible photosensitivity and discoloration of the teeth, skin, lips, tongue and gums.  Patient instructed to avoid sunlight, if possible.  When exposed to sunlight, patients should wear protective clothing, sunglasses, and sunscreen.  The patient was instructed to call the office immediately if the following severe adverse effects occur:  hearing changes, easy bruising/bleeding, severe headache, or vision changes.  The patient verbalized understanding of the proper use and possible adverse effects of minocycline.  All of the patient's questions and concerns were addressed.
Erivedge Counseling- I discussed with the patient the risks of Erivedge including but not limited to nausea, vomiting, diarrhea, constipation, weight loss, changes in the sense of taste, decreased appetite, muscle spasms, and hair loss.  The patient verbalized understanding of the proper use and possible adverse effects of Erivedge.  All of the patient's questions and concerns were addressed.
Cimzia Pregnancy And Lactation Text: This medication crosses the placenta but can be considered safe in certain situations. Cimzia may be excreted in breast milk.
Niacinamide Pregnancy And Lactation Text: These medications are considered safe during pregnancy.
Bexarotene Counseling:  I discussed with the patient the risks of bexarotene including but not limited to hair loss, dry lips/skin/eyes, liver abnormalities, hyperlipidemia, pancreatitis, depression/suicidal ideation, photosensitivity, drug rash/allergic reactions, hypothyroidism, anemia, leukopenia, infection, cataracts, and teratogenicity.  Patient understands that they will need regular blood tests to check lipid profile, liver function tests, white blood cell count, thyroid function tests and pregnancy test if applicable.
Otezla Counseling: The side effects of Otezla were discussed with the patient, including but not limited to worsening or new depression, weight loss, diarrhea, nausea, upper respiratory tract infection, and headache. Patient instructed to call the office should any adverse effect occur.  The patient verbalized understanding of the proper use and possible adverse effects of Otezla.  All the patient's questions and concerns were addressed.
Finasteride Male Counseling: Finasteride Counseling:  I discussed with the patient the risks of use of finasteride including but not limited to decreased libido, decreased ejaculate volume, gynecomastia, and depression. Women should not handle medication.  All of the patient's questions and concerns were addressed.
5-Fu Counseling: 5-Fluorouracil Counseling:  I discussed with the patient the risks of 5-fluorouracil including but not limited to erythema, scaling, itching, weeping, crusting, and pain.
Dupixent Counseling: I discussed with the patient the risks of dupilumab including but not limited to eye inflammation and irritation, cold sores, injection site reactions, allergic reactions and increased risk of parasitic infection. The patient understands that monitoring is required and they must alert us or the primary physician if symptoms of infection or other concerning signs are noted.
Winlevi Pregnancy And Lactation Text: This medication is considered safe during pregnancy and breastfeeding.
Tranexamic Acid Counseling:  Patient advised of the small risk of bleeding problems with tranexamic acid. They were also instructed to call if they developed any nausea, vomiting or diarrhea. All of the patient's questions and concerns were addressed.
Clindamycin Counseling: I counseled the patient regarding use of clindamycin as an antibiotic for prophylactic and/or therapeutic purposes. Clindamycin is active against numerous classes of bacteria, including skin bacteria. Side effects may include nausea, diarrhea, gastrointestinal upset, rash, hives, yeast infections, and in rare cases, colitis.
Soolantra Pregnancy And Lactation Text: This medication is Pregnancy Category C. This medication is considered safe during breast feeding.
5-Fu Pregnancy And Lactation Text: This medication is Pregnancy Category X and contraindicated in pregnancy and in women who may become pregnant. It is unknown if this medication is excreted in breast milk.
Dupixent Pregnancy And Lactation Text: This medication likely crosses the placenta but the risk for the fetus is uncertain. This medication is excreted in breast milk.
VTAMA Counseling: I discussed with the patient that VTAMA is not for use in the eyes, mouth or mouth. They should call the office if they develop any signs of allergic reactions to VTAMA. The patient verbalized understanding of the proper use and possible adverse effects of VTAMA.  All of the patient's questions and concerns were addressed.
Clindamycin Pregnancy And Lactation Text: This medication can be used in pregnancy if certain situations. Clindamycin is also present in breast milk.
Simlandi Counseling:  I discussed with the patient the risks of adalimumab including but not limited to myelosuppression, immunosuppression, autoimmune hepatitis, demyelinating diseases, lymphoma, and serious infections.  The patient understands that monitoring is required including a PPD at baseline and must alert us or the primary physician if symptoms of infection or other concerning signs are noted.
Tranexamic Acid Pregnancy And Lactation Text: It is unknown if this medication is safe during pregnancy or breast feeding.
Drysol Counseling:  I discussed with the patient the risks of drysol/aluminum chloride including but not limited to skin rash, itching, irritation, burning.
Topical Retinoid counseling:  Patient advised to apply a pea-sized amount only at bedtime and wait 30 minutes after washing their face before applying.  If too drying, patient may add a non-comedogenic moisturizer. The patient verbalized understanding of the proper use and possible adverse effects of retinoids.  All of the patient's questions and concerns were addressed.
Glycopyrrolate Counseling:  I discussed with the patient the risks of glycopyrrolate including but not limited to skin rash, drowsiness, dry mouth, difficulty urinating, and blurred vision.
Ebglyss Counseling: I discussed with the patient the risks of lebrikizumab including but not limited to eye inflammation and irritation, cold sores, injection site reactions, allergic reactions and increased risk of parasitic infection. The patient understands that monitoring is required and they must alert us or the primary physician if symptoms of infection or other concerning signs are noted.
Doxycycline Counseling:  Patient counseled regarding possible photosensitivity and increased risk for sunburn.  Patient instructed to avoid sunlight, if possible.  When exposed to sunlight, patients should wear protective clothing, sunglasses, and sunscreen.  The patient was instructed to call the office immediately if the following severe adverse effects occur:  hearing changes, easy bruising/bleeding, severe headache, or vision changes.  The patient verbalized understanding of the proper use and possible adverse effects of doxycycline.  All of the patient's questions and concerns were addressed.
Valtrex Counseling: I discussed with the patient the risks of valacyclovir including but not limited to kidney damage, nausea, vomiting and severe allergy.  The patient understands that if the infection seems to be worsening or is not improving, they are to call.
Glycopyrrolate Pregnancy And Lactation Text: This medication is Pregnancy Category B and is considered safe during pregnancy. It is unknown if it is excreted breast milk.
Ebglyss Pregnancy And Lactation Text: This medication likely crosses the placenta but the risk for the fetus is uncertain. It is unknown if this medication is excreted in breast milk.
Zoryve Counseling:  I discussed with the patient that Zoryve is not for use in the eyes, mouth or vagina. The most commonly reported side effects include diarrhea, headache, insomnia, application site pain, upper respiratory tract infections, and urinary tract infections.  All of the patient's questions and concerns were addressed.
Doxycycline Pregnancy And Lactation Text: This medication is Pregnancy Category D and not consider safe during pregnancy. It is also excreted in breast milk but is considered safe for shorter treatment courses.
Valtrex Pregnancy And Lactation Text: this medication is Pregnancy Category B and is considered safe during pregnancy. This medication is not directly found in breast milk but it's metabolite acyclovir is present.
Adbry Counseling: I discussed with the patient the risks of tralokinumab including but not limited to eye infection and irritation, cold sores, injection site reactions, worsening of asthma, allergic reactions and increased risk of parasitic infection.  Live vaccines should be avoided while taking tralokinumab. The patient understands that monitoring is required and they must alert us or the primary physician if symptoms of infection or other concerning signs are noted.
Simponi Counseling:  I discussed with the patient the risks of golimumab including but not limited to myelosuppression, immunosuppression, autoimmune hepatitis, demyelinating diseases, lymphoma, and serious infections.  The patient understands that monitoring is required including a PPD at baseline and must alert us or the primary physician if symptoms of infection or other concerning signs are noted.
Cibinqo Counseling: I discussed with the patient the risks of Cibinqo therapy including but not limited to common cold, nausea, headache, cold sores, increased blood CPK levels, dizziness, UTIs, fatigue, acne, and vomitting. Live vaccines should be avoided.  This medication has been linked to serious infections; higher rate of mortality; malignancy and lymphoproliferative disorders; major adverse cardiovascular events; thrombosis; thrombocytopenia and lymphopenia; lipid elevations; and retinal detachment.
Elidel Counseling: Patient may experience a mild burning sensation during topical application. Elidel is not approved in children less than 2 years of age. There have been case reports of hematologic and skin malignancies in patients using topical calcineurin inhibitors although causality is questionable.
Hydroxychloroquine Counseling:  I discussed with the patient that a baseline ophthalmologic exam is needed at the start of therapy and every year thereafter while on therapy. A CBC may also be warranted for monitoring.  The side effects of this medication were discussed with the patient, including but not limited to agranulocytosis, aplastic anemia, seizures, rashes, retinopathy, and liver toxicity. Patient instructed to call the office should any adverse effect occur.  The patient verbalized understanding of the proper use and possible adverse effects of Plaquenil.  All the patient's questions and concerns were addressed.
Tazorac Counseling:  Patient advised that medication is irritating and drying.  Patient may need to apply sparingly and wash off after an hour before eventually leaving it on overnight.  The patient verbalized understanding of the proper use and possible adverse effects of tazorac.  All of the patient's questions and concerns were addressed.
Enbrel Counseling:  I discussed with the patient the risks of etanercept including but not limited to myelosuppression, immunosuppression, autoimmune hepatitis, demyelinating diseases, lymphoma, and infections.  The patient understands that monitoring is required including a PPD at baseline and must alert us or the primary physician if symptoms of infection or other concerning signs are noted.
Azithromycin Pregnancy And Lactation Text: This medication is considered safe during pregnancy and is also secreted in breast milk.
Oral Minoxidil Counseling- I discussed with the patient the risks of oral minoxidil including but not limited to shortness of breath, swelling of the feet or ankles, dizziness, lightheadedness, unwanted hair growth and allergic reaction.  The patient verbalized understanding of the proper use and possible adverse effects of oral minoxidil.  All of the patient's questions and concerns were addressed.
Itraconazole Counseling:  I discussed with the patient the risks of itraconazole including but not limited to liver damage, nausea/vomiting, neuropathy, and severe allergy.  The patient understands that this medication is best absorbed when taken with acidic beverages such as non-diet cola or ginger ale.  The patient understands that monitoring is required including baseline LFTs and repeat LFTs at intervals.  The patient understands that they are to contact us or the primary physician if concerning signs are noted.
Topical Sulfur Applications Counseling: Topical Sulfur Counseling: Patient counseled that this medication may cause skin irritation or allergic reactions.  In the event of skin irritation, the patient was advised to reduce the amount of the drug applied or use it less frequently.   The patient verbalized understanding of the proper use and possible adverse effects of topical sulfur application.  All of the patient's questions and concerns were addressed.
High Dose Vitamin A Pregnancy And Lactation Text: High dose vitamin A therapy is contraindicated during pregnancy and breast feeding.
Propranolol Pregnancy And Lactation Text: This medication is Pregnancy Category C and it isn't known if it is safe during pregnancy. It is excreted in breast milk.
Minoxidil Counseling: Minoxidil is a topical medication which can increase blood flow where it is applied. It is uncertain how this medication increases hair growth. Side effects are uncommon and include stinging and allergic reactions.
Nemluvio Counseling: I discussed with the patient the risks of nemolizumab including but not limited to headache, gastrointestinal complaints, nasopharyngitis, musculoskeletal complaints, injection site reactions, and allergic reactions. The patient understands that monitoring is required and they must alert us or the primary physician if any side effects are noted.
Spironolactone Counseling: Patient advised regarding risks of diarrhea, abdominal pain, hyperkalemia, birth defects (for female patients), liver toxicity and renal toxicity. The patient may need blood work to monitor liver and kidney function and potassium levels while on therapy. The patient verbalized understanding of the proper use and possible adverse effects of spironolactone.  All of the patient's questions and concerns were addressed.
Doxepin Pregnancy And Lactation Text: This medication is Pregnancy Category C and it isn't known if it is safe during pregnancy. It is also excreted in breast milk and breast feeding isn't recommended.
Zepbound Counseling: I reviewed the possible side effects including: thyroid tumors, kidney disease, gallbladder disease, abdominal pain, constipation, diarrhea, nausea, vomiting and pancreatitis. Do not take this medication if you have a history or family history of multiple endocrine neoplasia syndrome type 2. Side effects reviewed, pt to contact office should one occur.
Rhofade Counseling: Rhofade is a topical medication which can decrease superficial blood flow where applied. Side effects are uncommon and include stinging, redness and allergic reactions.
Colchicine Counseling:  Patient counseled regarding adverse effects including but not limited to stomach upset (nausea, vomiting, stomach pain, or diarrhea).  Patient instructed to limit alcohol consumption while taking this medication.  Colchicine may reduce blood counts especially with prolonged use.  The patient understands that monitoring of kidney function and blood counts may be required, especially at baseline. The patient verbalized understanding of the proper use and possible adverse effects of colchicine.  All of the patient's questions and concerns were addressed.
Sarecycline Counseling: Patient advised regarding possible photosensitivity and discoloration of the teeth, skin, lips, tongue and gums.  Patient instructed to avoid sunlight, if possible.  When exposed to sunlight, patients should wear protective clothing, sunglasses, and sunscreen.  The patient was instructed to call the office immediately if the following severe adverse effects occur:  hearing changes, easy bruising/bleeding, severe headache, or vision changes.  The patient verbalized understanding of the proper use and possible adverse effects of sarecycline.  All of the patient's questions and concerns were addressed.
Xelrobbyz Pregnancy And Lactation Text: This medication is Pregnancy Category D and is not considered safe during pregnancy.  The risk during breast feeding is also uncertain.
Topical Sulfur Applications Pregnancy And Lactation Text: This medication is considered safe during pregnancy and breast feeding secondary to limited systemic absorption.
SSKI Counseling:  I discussed with the patient the risks of SSKI including but not limited to thyroid abnormalities, metallic taste, GI upset, fever, headache, acne, arthralgias, paraesthesias, lymphadenopathy, easy bleeding, arrhythmias, and allergic reaction.
Bactrim Counseling:  I discussed with the patient the risks of sulfa antibiotics including but not limited to GI upset, allergic reaction, drug rash, diarrhea, dizziness, photosensitivity, and yeast infections.  Rarely, more serious reactions can occur including but not limited to aplastic anemia, agranulocytosis, methemoglobinemia, blood dyscrasias, liver or kidney failure, lung infiltrates or desquamative/blistering drug rashes.
Nemluvio Pregnancy And Lactation Text: It is not known if Nemluvio causes fetal harm or is present in breast milk. Please proceed with caution if patients who are pregnant or breastfeeding.
Spironolactone Pregnancy And Lactation Text: This medication can cause feminization of the male fetus and should be avoided during pregnancy. The active metabolite is also found in breast milk.
Hydroxyzine Counseling: Patient advised that the medication is sedating and not to drive a car after taking this medication.  Patient informed of potential adverse effects including but not limited to dry mouth, urinary retention, and blurry vision.  The patient verbalized understanding of the proper use and possible adverse effects of hydroxyzine.  All of the patient's questions and concerns were addressed.
Methotrexate Counseling:  Patient counseled regarding adverse effects of methotrexate including but not limited to nausea, vomiting, abnormalities in liver function tests. Patients may develop mouth sores, rash, diarrhea, and abnormalities in blood counts. The patient understands that monitoring is required including LFT's and blood counts.  There is a rare possibility of scarring of the liver and lung problems that can occur when taking methotrexate. Persistent nausea, loss of appetite, pale stools, dark urine, cough, and shortness of breath should be reported immediately. Patient advised to discontinue methotrexate treatment at least three months before attempting to become pregnant.  I discussed the need for folate supplements while taking methotrexate.  These supplements can decrease side effects during methotrexate treatment. The patient verbalized understanding of the proper use and possible adverse effects of methotrexate.  All of the patient's questions and concerns were addressed.
Calcipotriene Counseling:  I discussed with the patient the risks of calcipotriene including but not limited to erythema, scaling, itching, and irritation.
Rhofade Pregnancy And Lactation Text: This medication has not been assigned a Pregnancy Risk Category. It is unknown if the medication is excreted in breast milk.
Xolair Counseling:  Patient informed of potential adverse effects including but not limited to fever, muscle aches, rash and allergic reactions.  The patient verbalized understanding of the proper use and possible adverse effects of Xolair.  All of the patient's questions and concerns were addressed.
Mirvaso Counseling: Mirvaso is a topical medication which can decrease superficial blood flow where applied. Side effects are uncommon and include stinging, redness and allergic reactions.
Wartpeel Counseling:  I discussed with the patient the risks of Wartpeel including but not limited to erythema, scaling, itching, weeping, crusting, and pain.
Ketoconazole Counseling:   Patient counseled regarding improving absorption with orange juice.  Adverse effects include but are not limited to breast enlargement, headache, diarrhea, nausea, upset stomach, liver function test abnormalities, taste disturbance, and stomach pain.  There is a rare possibility of liver failure that can occur when taking ketoconazole. The patient understands that monitoring of LFTs may be required, especially at baseline. The patient verbalized understanding of the proper use and possible adverse effects of ketoconazole.  All of the patient's questions and concerns were addressed.
Bactrim Pregnancy And Lactation Text: This medication is Pregnancy Category D and is known to cause fetal risk.  It is also excreted in breast milk.
Sski Pregnancy And Lactation Text: This medication is Pregnancy Category D and isn't considered safe during pregnancy. It is excreted in breast milk.
Rituxan Counseling:  I discussed with the patient the risks of Rituxan infusions. Side effects can include infusion reactions, severe drug rashes including mucocutaneous reactions, reactivation of latent hepatitis and other infections and rarely progressive multifocal leukoencephalopathy.  All of the patient's questions and concerns were addressed.
Hydroxyzine Pregnancy And Lactation Text: This medication is not safe during pregnancy and should not be taken. It is also excreted in breast milk and breast feeding isn't recommended.
Solaraze Counseling:  I discussed with the patient the risks of Solaraze including but not limited to erythema, scaling, itching, weeping, crusting, and pain.
Calcipotriene Pregnancy And Lactation Text: The use of this medication during pregnancy or lactation is not recommended as there is insufficient data.
Dapsone Counseling: I discussed with the patient the risks of dapsone including but not limited to hemolytic anemia, agranulocytosis, rashes, methemoglobinemia, kidney failure, peripheral neuropathy, headaches, GI upset, and liver toxicity.  Patients who start dapsone require monitoring including baseline LFTs and weekly CBCs for the first month, then every month thereafter.  The patient verbalized understanding of the proper use and possible adverse effects of dapsone.  All of the patient's questions and concerns were addressed.
Cosentyx Counseling:  I discussed with the patient the risks of Cosentyx including but not limited to worsening of Crohn's disease, immunosuppression, allergic reactions and infections.  The patient understands that monitoring is required including a PPD at baseline and must alert us or the primary physician if symptoms of infection or other concerning signs are noted.
Xolair Pregnancy And Lactation Text: This medication is Pregnancy Category B and is considered safe during pregnancy. This medication is excreted in breast milk.
Tetracycline Counseling: Patient counseled regarding possible photosensitivity and increased risk for sunburn.  Patient instructed to avoid sunlight, if possible.  When exposed to sunlight, patients should wear protective clothing, sunglasses, and sunscreen.  The patient was instructed to call the office immediately if the following severe adverse effects occur:  hearing changes, easy bruising/bleeding, severe headache, or vision changes.  The patient verbalized understanding of the proper use and possible adverse effects of tetracycline.  All of the patient's questions and concerns were addressed. Patient understands to avoid pregnancy while on therapy due to potential birth defects.
Cephalexin Counseling: I counseled the patient regarding use of cephalexin as an antibiotic for prophylactic and/or therapeutic purposes. Cephalexin (commonly prescribed under brand name Keflex) is a cephalosporin antibiotic which is active against numerous classes of bacteria, including most skin bacteria. Side effects may include nausea, diarrhea, gastrointestinal upset, rash, hives, yeast infections, and in rare cases, hepatitis, kidney disease, seizures, fever, confusion, neurologic symptoms, and others. Patients with severe allergies to penicillin medications are cautioned that there is about a 10% incidence of cross-reactivity with cephalosporins. When possible, patients with penicillin allergies should use alternatives to cephalosporins for antibiotic therapy.
Thalidomide Counseling: I discussed with the patient the risks of thalidomide including but not limited to birth defects, anxiety, weakness, chest pain, dizziness, cough and severe allergy.
Opioid Counseling: I discussed with the patient the potential side effects of opioids including but not limited to addiction, altered mental status, and depression. I stressed avoiding alcohol, benzodiazepines, muscle relaxants and sleep aids unless specifically okayed by a physician. The patient verbalized understanding of the proper use and possible adverse effects of opioids. All of the patient's questions and concerns were addressed. They were instructed to flush the remaining pills down the toilet if they did not need them for pain.
Rituxan Pregnancy And Lactation Text: This medication is Pregnancy Category C and it isn't know if it is safe during pregnancy. It is unknown if this medication is excreted in breast milk but similar antibodies are known to be excreted.
Ketoconazole Pregnancy And Lactation Text: This medication is Pregnancy Category C and it isn't know if it is safe during pregnancy. It is also excreted in breast milk and breast feeding isn't recommended.
Dapsone Pregnancy And Lactation Text: This medication is Pregnancy Category C and is not considered safe during pregnancy or breast feeding.
Solaraze Pregnancy And Lactation Text: This medication is Pregnancy Category B and is considered safe. There is some data to suggest avoiding during the third trimester. It is unknown if this medication is excreted in breast milk.
Cantharidin Counseling:  I discussed with the patient the risks of Cantharidin including but not limited to pain, redness, burning, itching, and blistering.
Detail Level: Simple
Cephalexin Pregnancy And Lactation Text: This medication is Pregnancy Category B and considered safe during pregnancy.  It is also excreted in breast milk but can be used safely for shorter doses.
Terbinafine Counseling: Patient counseling regarding adverse effects of terbinafine including but not limited to headache, diarrhea, rash, upset stomach, liver function test abnormalities, itching, taste/smell disturbance, nausea, abdominal pain, and flatulence.  There is a rare possibility of liver failure that can occur when taking terbinafine.  The patient understands that a baseline LFT and kidney function test may be required. The patient verbalized understanding of the proper use and possible adverse effects of terbinafine.  All of the patient's questions and concerns were addressed.
Winlevi Counseling:  I discussed with the patient the risks of topical clascoterone including but not limited to erythema, scaling, itching, and stinging. Patient voiced their understanding.
Siliq Counseling:  I discussed with the patient the risks of Siliq including but not limited to new or worsening depression, suicidal thoughts and behavior, immunosuppression, malignancy, posterior leukoencephalopathy syndrome, and serious infections.  The patient understands that monitoring is required including a PPD at baseline and must alert us or the primary physician if symptoms of infection or other concerning signs are noted. There is also a special program designed to monitor depression which is required with Siliq.
Opioid Pregnancy And Lactation Text: These medications can lead to premature delivery and should be avoided during pregnancy. These medications are also present in breast milk in small amounts.
Gabapentin Counseling: I discussed with the patient the risks of gabapentin including but not limited to dizziness, somnolence, fatigue and ataxia.
Soolantra Counseling: I discussed with the patients the risks of topial Soolantra. This is a medicine which decreases the number of mites and inflammation in the skin. You experience burning, stinging, eye irritation or allergic reactions.  Please call our office if you develop any problems from using this medication.

## 2024-11-25 NOTE — PROCEDURE: INTRAMUSCULAR KENALOG
Concentration (Mg/Ml): 40.0
Total Volume (Ccs): 1
Expiration Date (Optional): 05/2025
Kenalog Preparation: kenalog
Add Option For Additional Mediation: No
Concentration (Mg/Ml) Of Additional Medication: 2.5
Consent: The risks of atrophy were reviewed with the patient.
Detail Level: None
Administered By (Optional): Shaun MILLER
Lot # (Optional): [id number]
Ndc# (Optional): [address]-3

## 2024-12-17 ENCOUNTER — RX ONLY (RX ONLY)
Age: 49
End: 2024-12-17

## 2024-12-17 RX ORDER — GLYCOPYRROLATE 2 MG/1
TABLET ORAL
Qty: 90 | Refills: 11 | Status: ERX

## 2025-04-21 ENCOUNTER — APPOINTMENT (OUTPATIENT)
Dept: URBAN - METROPOLITAN AREA CLINIC 307 | Age: 50
Setting detail: DERMATOLOGY
End: 2025-04-22

## 2025-04-21 DIAGNOSIS — Z79.899 OTHER LONG TERM (CURRENT) DRUG THERAPY: ICD-10-CM

## 2025-04-21 PROCEDURE — OTHER ORDER TESTS: OTHER

## 2025-04-21 PROCEDURE — OTHER OTHER: OTHER

## 2025-06-18 ENCOUNTER — APPOINTMENT (OUTPATIENT)
Dept: URBAN - METROPOLITAN AREA CLINIC 307 | Age: 50
Setting detail: DERMATOLOGY
End: 2025-07-01

## 2025-06-18 DIAGNOSIS — L72.0 EPIDERMAL CYST: ICD-10-CM

## 2025-06-18 DIAGNOSIS — L20.89 OTHER ATOPIC DERMATITIS: ICD-10-CM

## 2025-06-18 PROCEDURE — OTHER RINVOQ MONITORING: OTHER

## 2025-06-18 PROCEDURE — OTHER COUNSELING: OTHER

## 2025-06-18 PROCEDURE — 99213 OFFICE O/P EST LOW 20 MIN: CPT

## 2025-06-18 PROCEDURE — OTHER PRESCRIPTION: OTHER

## 2025-06-18 PROCEDURE — OTHER MEDICATION COUNSELING: OTHER

## 2025-06-18 PROCEDURE — OTHER OTHER: OTHER

## 2025-06-18 PROCEDURE — OTHER EASI CALCULATION: OTHER

## 2025-06-18 RX ORDER — UPADACITINIB 15 MG/1
TABLET, EXTENDED RELEASE ORAL
Qty: 30 | Refills: 11 | Status: ERX

## 2025-06-18 ASSESSMENT — LOCATION ZONE DERM
LOCATION ZONE: LEG
LOCATION ZONE: FACE
LOCATION ZONE: ARM

## 2025-06-18 ASSESSMENT — LOCATION DETAILED DESCRIPTION DERM
LOCATION DETAILED: RIGHT VENTRAL DISTAL FOREARM
LOCATION DETAILED: LEFT ANTERIOR LATERAL DISTAL THIGH
LOCATION DETAILED: RIGHT PROXIMAL PRETIBIAL REGION
LOCATION DETAILED: RIGHT SUPERIOR MEDIAL MALAR CHEEK
LOCATION DETAILED: LEFT VENTRAL PROXIMAL FOREARM
LOCATION DETAILED: RIGHT SUPERIOR CENTRAL MALAR CHEEK

## 2025-06-18 ASSESSMENT — LOCATION SIMPLE DESCRIPTION DERM
LOCATION SIMPLE: RIGHT PRETIBIAL REGION
LOCATION SIMPLE: LEFT THIGH
LOCATION SIMPLE: LEFT FOREARM
LOCATION SIMPLE: RIGHT CHEEK
LOCATION SIMPLE: RIGHT FOREARM

## 2025-06-18 ASSESSMENT — BSA ECZEMA: % BODY COVERED IN ECZEMA: 4

## 2025-06-18 ASSESSMENT — SEVERITY ASSESSMENT 2020: SEVERITY 2020: MILD

## 2025-07-16 ENCOUNTER — RX ONLY (RX ONLY)
Age: 50
End: 2025-07-16

## 2025-07-16 RX ORDER — UPADACITINIB 15 MG/1
TABLET, EXTENDED RELEASE ORAL
Qty: 30 | Refills: 11 | Status: ERX